# Patient Record
Sex: MALE | Race: ASIAN | NOT HISPANIC OR LATINO | Employment: FULL TIME | URBAN - METROPOLITAN AREA
[De-identification: names, ages, dates, MRNs, and addresses within clinical notes are randomized per-mention and may not be internally consistent; named-entity substitution may affect disease eponyms.]

---

## 2017-02-13 ENCOUNTER — OFFICE VISIT (OUTPATIENT)
Dept: GASTROENTEROLOGY | Facility: CLINIC | Age: 27
End: 2017-02-13

## 2017-02-13 VITALS
HEIGHT: 66 IN | OXYGEN SATURATION: 96 % | DIASTOLIC BLOOD PRESSURE: 74 MMHG | SYSTOLIC BLOOD PRESSURE: 115 MMHG | WEIGHT: 151 LBS | HEART RATE: 84 BPM | BODY MASS INDEX: 24.27 KG/M2

## 2017-02-13 DIAGNOSIS — K50.10 CROHN'S DISEASE OF LARGE INTESTINE WITHOUT COMPLICATION (H): Primary | ICD-10-CM

## 2017-02-13 RX ORDER — INFLIXIMAB 100 MG/10ML
INJECTION, POWDER, LYOPHILIZED, FOR SOLUTION INTRAVENOUS
COMMUNITY
Start: 2016-04-06 | End: 2020-06-09

## 2017-02-13 ASSESSMENT — PAIN SCALES - GENERAL: PAINLEVEL: NO PAIN (0)

## 2017-02-13 NOTE — PROGRESS NOTES
GI CLINIC VISIT - NEW PATIENT    CC/REFERRING MD:   Referred Self  REASON FOR CONSULTATION:  Transfer care of Crohn's Disease    HPI:  26 year old male with primarily R colon CD, originally diagnosed in 2014 who presents to clinic to establish care within the [facility] system. He has had bowel issues, to some degree, since approximately 2011. At that time, he had abdominal pain, cramping and nausea. He was evaluated at that time and was diagnosed with C. Diff colitis. He was treated with metronidazole for a course and felt better. He also had a colonoscopy at that time that apparently showed acute colitis and had no changes c/w chronic inflammation. Approximately 18 months later, he again had abdominal pain and again was diagnosed with C. Diff colitis. He was given vancomycin this time and again improved somewhat and residual symptoms were felt to be due to IBS which was treated with bentyl. His symptoms persisted, however and led to repeat colonoscopy in 2014 that showed changes c/w chronic active inflammatory bowel disease. Due to granulomas in the biopsy specimen, this was felt to represent Crohn's disease. He was initially started on budesonide for a brief period of time (1-2 weeks) but due to persistent symptoms was initiated on remicade therapy (5 mg/kg q8 weeks). He had significant improvement in his symptoms and resolution of his abdominal pain. He is now having 2 formed bowel movements per day (mostly formed but some alternating loose and hard stools). For the last two weeks, he has noted new symptoms of insecurity passing flatus (no accidents but has more insecurity) and some sense of incomplete evacuation. These have both developed in the past two weeks. No fevers, chills, oral ulcers, rashes, joint pains or other EIM.     Has never been on systemic steroids or immunomodulator therapy.     ROS:  10pt ROS performed and otherwise negative.    PERTINENT PAST MEDICAL HISTORY:  As noted above.    PREVIOUS  "ABDOMINAL/GYNECOLOGIC SURGERIES:  As noted above.      PREVIOUS ENDOSCOPY:  Colonoscopy in 2/2016 with mild chronic active colitis in R colon/cecum and normal histology on remainder of random colon biopsies    PERTINENT MEDICATIONS:  Remicade 5mg/kg q8 weeks last infusion 1/13/2017  Multivitamin  Probiotic  Medications reviewed with patient today, see Medication List/Assessment for details.  No other NSAID/anticoagulation reported by patient.  No other OTC/herbal/supplements reported by patient.    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  No IBD/celiac, no other AI/liver/thyroid disease.    PHYSICAL EXAMINATION:  /74  Pulse 84  Ht 1.676 m (5' 6\")  Wt 68.5 kg (151 lb)  SpO2 96%  BMI 24.37 kg/m2  Gen: aaox3, cooperative, pleasant, not dyspneic/diaphoretic, nad  HEENT: ncat, neck supple, no clad/sclad, normal op w/o ulcer/exudate, anicteric, mmm  Mallampati Score II  Resp/CV RRR, nl s1/2, no crackles, rhonchi, rales, good air exchange  Abd:  Soft, NT, ND, BS+  Ext: no c/c/e  Skin: warm, perfused, no jaundice  Neuro: grossly intact, no asterixis noted    PERTINENT STUDIES:  None recently  ASSESSMENT/PLAN:    1. Moderate colonic Crohn's disease currently in clinical remission on remicade therapy   -At this point in time, he has been on biologic monotherapy for >18 months with no clinical evidence of active disease. He has not had recent laboratory work, so will schedule this to be drawn prior to his next infliximab infusion. His last endoscopic surveillance was in 2/2016 and showed mild active disease present only in the cecum (biopsies were taken near the appendiceal orifice) and no active or chronic colitis on the remainder of biopsy specimens. This should be considered near histologic remission as well. "   -As he has never had drug levels checked, we need to ensure he is at therapeutic concentration (certainly above 4, ideally 6-8) for maintenance of remission and decreased antibody formation long-term (certainly higher with CD than UC but majority form in first 12-18 months).  -Data from BRIdGE study would suggest that he is not in a demographic (young male non-smoker with no prior surgeries) that would benefit from dual immune suppression and this is further bolstered by his current deep remission on monotherapy.     2. Colorectal Cancer Screening  -Not due at this time, originally dx in 2014    3. IBD preventative care:  Vaccinations: PCP to ensure UTD including pneumococcal vaccines, seaasonal influenza and consideration for HPV vaccine  Bone health: no steroid exposure, can defer DEXA at this time. Counseled on calcium/vitamin D intake  Dysplasia surveillance: not indicated currently    Counseled against tobacco use, NSAID use (associated with ~25% risk for flare) and to receive yearly skin checks given biologic therapy.         RTC 3-6 months with ZOLTAN Wayne, sooner if symptomatic.      This patient was discussed with Dr. Patten, attending gastroenterologist    Gerardo Falk MD  GI fellow  (p) [phone]

## 2017-02-13 NOTE — NURSING NOTE
Patient left before after visit  summary was completed by Dr. Falk. Printed copy sent via postal service.

## 2017-02-13 NOTE — PATIENT INSTRUCTIONS
I've included a brief summary of our discussion and care plan from today's visit below.  Please review this information with your primary care provider.  _______________________________________________________________________      1. Thanks for coming into clinic to see us today, we appreciate you taking the time out of your day.    2. Recommend routine studies including nutritional and inflammatory markers as we discussed today.  We will also check a remicade level with these labs before your next infusion    3. Start benefiber 1-2 tablespoons daily dissolved in a cool glass of water. If your symptoms (unsure about passing gas and not emptying rectum completely) continue 2-3 months after fiber therapy (or if these symptoms worsen, you have any bleeding, pain or entirely liquid/mushy stools), let us know and we will arrange for a flexible sigmoidoscopy.    4. Make sure you are up to date with vaccinations (including HPV) from your PCP but know we recommend strongly against any live vaccines    5. Take daily calcium/vitamin D      6. Return to GI Clinic with Eric Orellana in 6 months to review your progress, sooner if symptomatic.     If you are unable to schedule this follow-up appointment today, please contact Lata Raymond at [phone] within the next week to help set up this necessary appointment.    _______________________________________________________________________    It was a pleasure seeing you in clinic today - please be in touch if there are any further questions that arise following today's visit.  During business hours, you may reach my Clinic Coordinator at [phone].  For urgent/emergent questions after business hours, you may reach the on-call GI Fellow by contacting the [facility] at [phone].    Any benign/non-urgent test results are usually communicated via letter or [employer] [place] message within 1-2 weeks after completion.  Urgent results (those that require a change in  the previously-discussed care plan) are usually communicated via a phone call once available from our clinic staff to discuss the results and the next steps in your evaluation.    I recommend signing up for [employer] access if you have not already done so and are comfortable with using a computer.  This allows for online access to your lab results and also helps you communicate efficiently with my clinic should any questions arise in your care.    We have Financial Counseling services available through our clinic.  If you have questions about your insurance coverage or payment responsibilities, particularly before you undergo any tests or procedures, please let us know and we can arrange a consultation accordingly to help you make informed decisions about your healthcare.    Sincerely,     Gerardo Falk MD  Gastroenterology Fellow      Our  will call you will a follow up appt with Ms. Esteban Trinidad will call and discuss your infusion.

## 2017-02-13 NOTE — LETTER
2/13/2017       RE: Emil Garcia  [address] [address]     Dear Colleague,    Thank you for referring your patient, Emil Garcia, to the [facility] GASTROENTEROLOGY AND IBD at [facility]. Please see a copy of my visit note below.    GI CLINIC VISIT - NEW PATIENT    CC/REFERRING MD:   Referred Self  REASON FOR CONSULTATION:  Transfer care of Crohn's Disease    HPI:  26 year old male with primarily R colon CD, originally diagnosed in 2014 who presents to clinic to establish care within the [facility] system. He has had bowel issues, to some degree, since approximately 2011. At that time, he had abdominal pain, cramping and nausea. He was evaluated at that time and was diagnosed with C. Diff colitis. He was treated with metronidazole for a course and felt better. He also had a colonoscopy at that time that apparently showed acute colitis and had no changes c/w chronic inflammation. Approximately 18 months later, he again had abdominal pain and again was diagnosed with C. Diff colitis. He was given vancomycin this time and again improved somewhat and residual symptoms were felt to be due to IBS which was treated with bentyl. His symptoms persisted, however and led to repeat colonoscopy in 2014 that showed changes c/w chronic active inflammatory bowel disease. Due to granulomas in the biopsy specimen, this was felt to represent Crohn's disease. He was initially started on budesonide for a brief period of time (1-2 weeks) but due to persistent symptoms was initiated on remicade therapy (5 mg/kg q8 weeks). He had significant improvement in his symptoms and resolution of his abdominal pain. He is now having 2 formed bowel movements per day (mostly formed but some alternating loose and hard stools). For the last two weeks, he has noted new symptoms of insecurity passing flatus (no accidents but has more insecurity) and some sense of incomplete evacuation. These have  "both developed in the past two weeks. No fevers, chills, oral ulcers, rashes, joint pains or other EIM.     Has never been on systemic steroids or immunomodulator therapy.     ROS:  10pt ROS performed and otherwise negative.    PERTINENT PAST MEDICAL HISTORY:  As noted above.    PREVIOUS ABDOMINAL/GYNECOLOGIC SURGERIES:  As noted above.      PREVIOUS ENDOSCOPY:  Colonoscopy in 2/2016 with mild chronic active colitis in R colon/cecum and normal histology on remainder of random colon biopsies    PERTINENT MEDICATIONS:  Remicade 5mg/kg q8 weeks last infusion 1/13/2017  Multivitamin  Probiotic  Medications reviewed with patient today, see Medication List/Assessment for details.  No other NSAID/anticoagulation reported by patient.  No other OTC/herbal/supplements reported by patient.    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  No IBD/celiac, no other AI/liver/thyroid disease.    PHYSICAL EXAMINATION:  /74  Pulse 84  Ht 1.676 m (5' 6\")  Wt 68.5 kg (151 lb)  SpO2 96%  BMI 24.37 kg/m2  Gen: aaox3, cooperative, pleasant, not dyspneic/diaphoretic, nad  HEENT: ncat, neck supple, no clad/sclad, normal op w/o ulcer/exudate, anicteric, mmm  Mallampati Score II  Resp/CV RRR, nl s1/2, no crackles, rhonchi, rales, good air exchange  Abd:  Soft, NT, ND, BS+  Ext: no c/c/e  Skin: warm, perfused, no jaundice  Neuro: grossly intact, no asterixis noted    PERTINENT STUDIES:  None recently  ASSESSMENT/PLAN:    1. Moderate colonic Crohn's disease currently in clinical remission on remicade therapy   -At this point in time, he has been on biologic monotherapy for >18 months with no clinical evidence of active disease. He has not had recent laboratory work, so will schedule this to be drawn prior to his next infliximab infusion. " His last endoscopic surveillance was in 2/2016 and showed mild active disease present only in the cecum (biopsies were taken near the appendiceal orifice) and no active or chronic colitis on the remainder of biopsy specimens. This should be considered near histologic remission as well.   -As he has never had drug levels checked, we need to ensure he is at therapeutic concentration (certainly above 4, ideally 6-8) for maintenance of remission and decreased antibody formation long-term (certainly higher with CD than UC but majority form in first 12-18 months).  -Data from BRIdGE study would suggest that he is not in a demographic (young male non-smoker with no prior surgeries) that would benefit from dual immune suppression and this is further bolstered by his current deep remission on monotherapy.     2. Colorectal Cancer Screening  -Not due at this time, originally dx in 2014    3. IBD preventative care:  Vaccinations: PCP to ensure UTD including pneumococcal vaccines, seaasonal influenza and consideration for HPV vaccine  Bone health: no steroid exposure, can defer DEXA at this time. Counseled on calcium/vitamin D intake  Dysplasia surveillance: not indicated currently    Counseled against tobacco use, NSAID use (associated with ~25% risk for flare) and to receive yearly skin checks given biologic therapy.         RTC 3-6 months with ZOLTAN Wayne, sooner if symptomatic.      This patient was discussed with Dr. Patten, attending gastroenterologist    Gerardo Falk MD  GI fellow  (p) [phone]      I performed a history and physical examination of the above patient and discussed the management with Dr. Falk on 2/13/2017. I reviewed the note and there are no changes to the past medical, family or social history.  A complete 10 point review of systems was obtained. Please see the HPI for pertinent positives and negatives. All other systems were reviewed and were found to be negative. I agree with the documented  findings and plan of care as outlined with the following additions:    26 year old male with abdominal pain and C diff who was ultimately diagnosed with Crohn's disease in the right colon (ileum spared).  He was initially treated on steroids (budesonide and prednisone) but had continued symptoms.  He was started on Infliximab monotherapy (5mg/kg q8), and repeat colonoscopy 2016 with periappendiceal inflammation, otherwise normal.  His pain also improved.  Prior MREs without small bowel disease.     Currently having two well formed stools a day. He is having some possible symptoms of tenesmus.     Suspect symptoms related to some IBS or diet at this time.  Will trial soluble fiber for three months.  If persistent or worsening symptoms will consider flex sig to rule out rectal inflammation.      Otherwise plan to optimize infliximab monotherapy with drug level at next infusion.      RTC in 4-6 months with ZOLTAN Wayne MD  GI Attending  Pager: [phone]

## 2017-02-13 NOTE — NURSING NOTE
"Chief Complaint   Patient presents with     Consult     would like to establish continuing care       Vitals:    02/13/17 1509   BP: 115/74   Pulse: 84   SpO2: 96%   Weight: 68.5 kg (151 lb)   Height: 1.676 m (5' 6\")       Body mass index is 24.37 kg/(m^2).                          "

## 2017-02-13 NOTE — MR AVS SNAPSHOT
After Visit Summary   2/13/2017    Emil Garcia    MRN: [id number]           Patient Information     Date Of Birth          1990        Visit Information        Provider Department      2/13/2017 3:00 PM Jim Patten MD [facility] Gastroenterology and IBD        Care Instructions    I've included a brief summary of our discussion and care plan from today's visit below.  Please review this information with your primary care provider.  _______________________________________________________________________      1. Thanks for coming into clinic to see us today, we appreciate you taking the time out of your day.    2. Recommend routine studies including nutritional and inflammatory markers as we discussed today.  We will also check a remicade level with these labs before your next infusion    3. Start benefiber 1-2 tablespoons daily dissolved in a cool glass of water. If your symptoms (unsure about passing gas and not emptying rectum completely) continue 2-3 months after fiber therapy (or if these symptoms worsen, you have any bleeding, pain or entirely liquid/mushy stools), let us know and we will arrange for a flexible sigmoidoscopy.    4. Make sure you are up to date with vaccinations (including HPV) from your PCP but know we recommend strongly against any live vaccines    5. Take daily calcium/vitamin D      6. Return to GI Clinic with Eric Orellana in 6 months to review your progress, sooner if symptomatic.     If you are unable to schedule this follow-up appointment today, please contact Lata Raymond at [phone] within the next week to help set up this necessary appointment.    _______________________________________________________________________    It was a pleasure seeing you in clinic today - please be in touch if there are any further questions that arise following today's visit.  During business hours, you may reach my Clinic Coordinator at [phone].  For urgent/emergent  questions after business hours, you may reach the on-call GI Fellow by contacting the [facility]  at [phone].    Any benign/non-urgent test results are usually communicated via letter or Checkmarxhart message within 1-2 weeks after completion.  Urgent results (those that require a change in the previously-discussed care plan) are usually communicated via a phone call once available from our clinic staff to discuss the results and the next steps in your evaluation.    I recommend signing up for [employer] access if you have not already done so and are comfortable with using a computer.  This allows for online access to your lab results and also helps you communicate efficiently with my clinic should any questions arise in your care.    We have Financial Counseling services available through our clinic.  If you have questions about your insurance coverage or payment responsibilities, particularly before you undergo any tests or procedures, please let us know and we can arrange a consultation accordingly to help you make informed decisions about your healthcare.    Sincerely,     Gerardo Falk MD  Gastroenterology Fellow      Our  will call you will a follow up appt with Ms. Esteban Trinidad will call and discuss your infusion.           Follow-ups after your visit        Who to contact     Please call your clinic at [phone] to:    Ask questions about your health    Make or cancel appointments    Discuss your medicines    Learn about your test results    Speak to your doctor   If you have compliments or concerns about an experience at your clinic, or if you wish to file a complaint, please contact [facility] Patient Relations at [phone] or email us at [email or web address]         Additional Information About Your Visit        [employer] Information     [employer] is an electronic gateway that provides easy, online access to your medical records. With [employer],  "you can request a clinic appointment, read your test results, renew a prescription or communicate with your care team.     To sign up for [employer] visit the website at [email or web address]   You will be asked to enter the access code listed below, as well as some personal information. Please follow the directions to create your username and password.     Your access code is: [email or web address]  Expires: 2017  6:30 AM     Your access code will  in 90 days. If you need help or a new code, please contact your [facility] or call [phone] for assistance.        Care EveryWhere ID     This is your Care EveryWhere ID. This could be used by other organizations to access your [facility] medical records  [id number]        Your Vitals Were     Pulse Height Pulse Oximetry BMI (Body Mass Index)          84 1.676 m (5' 6\") 96% 24.37 kg/m2         Blood Pressure from Last 3 Encounters:   17 115/74   11 100/72    Weight from Last 3 Encounters:   17 68.5 kg (151 lb)   11 57.2 kg (126 lb) (8 %)*     * Growth percentiles are based on CDC 2-20 Years data.              Today, you had the following     No orders found for display       Primary Care Provider    Physician No Ref-Primary       No address on file        Thank you!     Thank you for choosing [facility] GASTROENTEROLOGY AND IBD  for your care. Our goal is always to provide you with excellent care. Hearing back from our patients is one way we can continue to improve our services. Please take a few minutes to complete the written survey that you may receive in the mail after your visit with us. Thank you!             Your Updated Medication List - Protect others around you: Learn how to safely use, store and throw away your medicines at www.disposemymeds.org.          This list is accurate as of: 17  4:23 PM.  Always use your most recent med list.                   Brand Name Dispense Instructions for use "    inFLIXimab 100 MG injection    REMICADE     100 mg IV every 8 weeks       MULTIVITAL PO      Take  by mouth.

## 2017-02-13 NOTE — PROGRESS NOTES
I performed a history and physical examination of the above patient and discussed the management with Dr. Falk on 2/13/2017. I reviewed the note and there are no changes to the past medical, family or social history.  A complete 10 point review of systems was obtained. Please see the HPI for pertinent positives and negatives. All other systems were reviewed and were found to be negative. I agree with the documented findings and plan of care as outlined with the following additions:    26 year old male with abdominal pain and C diff who was ultimately diagnosed with Crohn's disease in the right colon (ileum spared).  He was initially treated on steroids (budesonide and prednisone) but had continued symptoms.  He was started on Infliximab monotherapy (5mg/kg q8), and repeat colonoscopy 2016 with periappendiceal inflammation, otherwise normal.  His pain also improved.  Prior MREs without small bowel disease.     Currently having two well formed stools a day. He is having some possible symptoms of tenesmus.     Suspect symptoms related to some IBS or diet at this time.  Will trial soluble fiber for three months.  If persistent or worsening symptoms will consider flex sig to rule out rectal inflammation.      Otherwise plan to optimize infliximab monotherapy with drug level at next infusion.      RTC in 4-6 months with ZOLTAN Wayne MD  GI Attending  Pager: [phone]

## 2017-02-14 ENCOUNTER — TELEPHONE (OUTPATIENT)
Dept: GASTROENTEROLOGY | Facility: CLINIC | Age: 27
End: 2017-02-14

## 2017-02-14 DIAGNOSIS — K50.90 CROHN'S DISEASE (H): Primary | ICD-10-CM

## 2017-02-14 RX ORDER — ACETAMINOPHEN 325 MG/1
650 TABLET ORAL ONCE
Status: CANCELLED
Start: 2017-03-24 | End: 2017-03-24

## 2017-02-14 RX ORDER — DIPHENHYDRAMINE HCL 25 MG
25 CAPSULE ORAL ONCE
Status: CANCELLED
Start: 2017-03-24 | End: 2017-03-24

## 2017-02-14 NOTE — TELEPHONE ENCOUNTER
Called patient and let him know that he can call to schedule his infusion.  Patient  given to number to call to schedule.

## 2017-02-14 NOTE — TELEPHONE ENCOUNTER
Emil is calling to schedule remicade infusion.  I do not see current infusion plan / orders.  He was new to Dr. Patten yesterday.  He previously was seen at [facility].  He reports his next infusion (Q 8 weeks) is due 3/24.  He's calling ahead because his previous infusion site was difficult to get into.     Will route to Dr. Sandor Trinidad's clinic nurse for orders.  Requests call back when complete and with infusion scheduling number.

## 2017-02-14 NOTE — NURSING NOTE
Patient is due for next remicade infusion on March 24.   Will call pt with th number to schedule appt.   Will do infliximab level prior to start of infusion on March 24th.

## 2017-02-20 ENCOUNTER — CARE COORDINATION (OUTPATIENT)
Dept: GASTROENTEROLOGY | Facility: CLINIC | Age: 27
End: 2017-02-20

## 2017-02-20 NOTE — PROGRESS NOTES
Called and left a message for patient to call to discuss his infusion and date of his last infusion at another facility.  Left my name and number for pt to call me back.

## 2017-02-21 ENCOUNTER — CARE COORDINATION (OUTPATIENT)
Dept: GASTROENTEROLOGY | Facility: CLINIC | Age: 27
End: 2017-02-21

## 2017-02-21 NOTE — PROGRESS NOTES
Patient returned call.  Stated his last infusion was on January 27.  Patient is new pt with [facility]  Has infusion at [facility].  Patient scheduled himself for March 31 which is 9 weeks inteval.  Discussed importance to have at 8 week intervals.  That if his work or vacation plans interfere with the 8 week intervals can schedule at 6 to 8 interval.   will obtain [facility] infusion records. Patient will call and reschedule infusion appt.

## 2017-03-17 ENCOUNTER — INFUSION THERAPY VISIT (OUTPATIENT)
Dept: INFUSION THERAPY | Facility: CLINIC | Age: 27
End: 2017-03-17
Attending: INTERNAL MEDICINE
Payer: COMMERCIAL

## 2017-03-17 VITALS
HEART RATE: 71 BPM | TEMPERATURE: 97.9 F | WEIGHT: 150.9 LBS | SYSTOLIC BLOOD PRESSURE: 104 MMHG | DIASTOLIC BLOOD PRESSURE: 62 MMHG | BODY MASS INDEX: 24.36 KG/M2 | RESPIRATION RATE: 16 BRPM | OXYGEN SATURATION: 100 %

## 2017-03-17 DIAGNOSIS — K50.90 CROHN'S DISEASE WITHOUT COMPLICATION, UNSPECIFIED GASTROINTESTINAL TRACT LOCATION (H): Primary | ICD-10-CM

## 2017-03-17 DIAGNOSIS — K50.90 CROHN'S DISEASE (H): Primary | ICD-10-CM

## 2017-03-17 LAB
ALBUMIN SERPL-MCNC: 3.7 G/DL (ref 3.4–5)
ALP SERPL-CCNC: 64 U/L (ref 40–150)
ALT SERPL W P-5'-P-CCNC: 58 U/L (ref 0–70)
AST SERPL W P-5'-P-CCNC: 42 U/L (ref 0–45)
BASOPHILS # BLD AUTO: 0 10E9/L (ref 0–0.2)
BASOPHILS NFR BLD AUTO: 0.3 %
BILIRUB DIRECT SERPL-MCNC: 0.2 MG/DL (ref 0–0.2)
BILIRUB SERPL-MCNC: 1.1 MG/DL (ref 0.2–1.3)
CRP SERPL-MCNC: <2.9 MG/L (ref 0–8)
DIFFERENTIAL METHOD BLD: NORMAL
EOSINOPHIL # BLD AUTO: 0.2 10E9/L (ref 0–0.7)
EOSINOPHIL NFR BLD AUTO: 2.8 %
ERYTHROCYTE [DISTWIDTH] IN BLOOD BY AUTOMATED COUNT: 11.9 % (ref 10–15)
ERYTHROCYTE [SEDIMENTATION RATE] IN BLOOD BY WESTERGREN METHOD: 6 MM/H (ref 0–15)
HCT VFR BLD AUTO: 44 % (ref 40–53)
HGB BLD-MCNC: 16 G/DL (ref 13.3–17.7)
IMM GRANULOCYTES # BLD: 0 10E9/L (ref 0–0.4)
IMM GRANULOCYTES NFR BLD: 0.3 %
LYMPHOCYTES # BLD AUTO: 3 10E9/L (ref 0.8–5.3)
LYMPHOCYTES NFR BLD AUTO: 42.8 %
MCH RBC QN AUTO: 32.2 PG (ref 26.5–33)
MCHC RBC AUTO-ENTMCNC: 36.4 G/DL (ref 31.5–36.5)
MCV RBC AUTO: 89 FL (ref 78–100)
MISCELLANEOUS TEST: NORMAL
MONOCYTES # BLD AUTO: 0.4 10E9/L (ref 0–1.3)
MONOCYTES NFR BLD AUTO: 6 %
NEUTROPHILS # BLD AUTO: 3.4 10E9/L (ref 1.6–8.3)
NEUTROPHILS NFR BLD AUTO: 47.8 %
NRBC # BLD AUTO: 0 10*3/UL
NRBC BLD AUTO-RTO: 0 /100
PLATELET # BLD AUTO: 333 10E9/L (ref 150–450)
PROT SERPL-MCNC: 7.4 G/DL (ref 6.8–8.8)
RBC # BLD AUTO: 4.97 10E12/L (ref 4.4–5.9)
WBC # BLD AUTO: 7 10E9/L (ref 4–11)

## 2017-03-17 PROCEDURE — 85025 COMPLETE CBC W/AUTO DIFF WBC: CPT | Performed by: INTERNAL MEDICINE

## 2017-03-17 PROCEDURE — 85652 RBC SED RATE AUTOMATED: CPT | Performed by: INTERNAL MEDICINE

## 2017-03-17 PROCEDURE — 86140 C-REACTIVE PROTEIN: CPT | Performed by: INTERNAL MEDICINE

## 2017-03-17 PROCEDURE — 96413 CHEMO IV INFUSION 1 HR: CPT

## 2017-03-17 PROCEDURE — 96415 CHEMO IV INFUSION ADDL HR: CPT

## 2017-03-17 PROCEDURE — 80076 HEPATIC FUNCTION PANEL: CPT | Performed by: INTERNAL MEDICINE

## 2017-03-17 PROCEDURE — 25000128 H RX IP 250 OP 636: Mod: ZF | Performed by: INTERNAL MEDICINE

## 2017-03-17 RX ORDER — ACETAMINOPHEN 325 MG/1
650 TABLET ORAL ONCE
Status: CANCELLED
Start: 2017-03-24 | End: 2017-03-24

## 2017-03-17 RX ORDER — DIPHENHYDRAMINE HCL 25 MG
25 CAPSULE ORAL ONCE
Status: CANCELLED
Start: 2017-03-24 | End: 2017-03-24

## 2017-03-17 RX ADMIN — INFLIXIMAB 300 MG: 100 INJECTION, POWDER, LYOPHILIZED, FOR SOLUTION INTRAVENOUS at 13:41

## 2017-03-17 NOTE — LETTER
Patient:  Emil Garcia  :   1990  MRN:     [id number]        Mr.Zhengyang Garcia  [address] [address]        2017    Dear ,    Your Remicade level returned at 5.1 without any antibodies.  This is an excellent level.  Please continue your infusions ever 8 weeks.  It is very important to never go beyond 8 weeks.  Always come in early if you are concerned.  Contact my office if you have trouble scheduling infusions or have any questions ([phone]).     Jim Patten MD    [facility]  Inflammatory Bowel Disease Program  Division of Gastroenterology, Hepatology and Nutrition

## 2017-03-17 NOTE — MR AVS SNAPSHOT
After Visit Summary   3/17/2017    Emil Garcia    MRN: [id number]           Patient Information     Date Of Birth          1990        Visit Information        Provider Department      3/17/2017 1:00 PM UC 43 ATC; UC SPEC INFUSION [facility] Specialty and Procedure        Today's Diagnoses     Crohn's disease (H)    -  1       Follow-ups after your visit        Your next 10 appointments already scheduled     May 12, 2017 12:00 PM CDT   Infusion 180 with UC SPEC INFUSION, UC 47 ATC   [facility] Specialty and Procedure ([facility])    [address]  2nd Floor  [facility] [address] [phone]            Sep 08, 2017 12:30 PM CDT   (Arrive by 12:15 PM)   Return Visit with Eric Orellana PA-C   [facility] Gastroenterology and IBD ([facility])    [address] [place] [address]  [address] [facility] [address] [phone]              Who to contact     If you have questions or need follow up information about today's clinic visit or your schedule please contact [facility] SPECIALTY AND PROCEDURE directly at [phone].  Normal or non-critical lab and imaging results will be communicated to you by MyChart, letter or phone within 4 business days after the clinic has received the results. If you do not hear from us within 7 days, please contact the clinic through Faveoushart or phone. If you have a critical or abnormal lab result, we will notify you by phone as soon as possible.  Submit refill requests through Gamestaq or call your pharmacy and they will forward the refill request to us. Please allow 3 business days for your refill to be completed.          Additional Information About Your Visit        MyChart Information     Gamestaq lets you send messages to your doctor, view your test results, renew your prescriptions, schedule appointments and more. To sign up, go to  "[email or web address] . Click on \"Log in\" on the left side of the screen, which will take you to the Welcome page. Then click on \"Sign up Now\" on the right side of the page.     You will be asked to enter the access code listed below, as well as some personal information. Please follow the directions to create your username and password.     Your access code is: [email or web address]  Expires: 2017  7:30 AM     Your access code will  in 90 days. If you need help or a new code, please call your [place] clinic or [phone].        Care EveryWhere ID     This is your Care EveryWhere ID. This could be used by other organizations to access your [place] medical records  [id number]        Your Vitals Were     Pulse Temperature Respirations Pulse Oximetry BMI (Body Mass Index)       71 97.9  F (36.6  C) (Oral) 16 100% 24.36 kg/m2        Blood Pressure from Last 3 Encounters:   17 104/62   17 115/74   11 100/72    Weight from Last 3 Encounters:   17 68.4 kg (150 lb 14.4 oz)   17 68.5 kg (151 lb)   11 57.2 kg (126 lb) (8 %)*     * Growth percentiles are based on [place] 2-20 Years data.              We Performed the Following     CBC with platelets differential     CRP inflammation     Erythrocyte sedimentation rate auto     Hepatic panel     infliximab level to be drawn prior to infusion and sent to [facility].  kit at [facility]: Laboratory Miscellaneous Order     Send outs misc test     Treatment Conditions     Treatment Conditions     Treatment Conditions        Primary Care Provider    Physician No Ref-Primary       No address on file        Thank you!     Thank you for choosing [facility] SPECIALTY AND PROCEDURE  for your care. Our goal is always to provide you with excellent care. Hearing back from our patients is one way we can continue to improve our services. Please take a few minutes to complete the written survey that you may receive in the mail after your " visit with us. Thank you!             Your Updated Medication List - Protect others around you: Learn how to safely use, store and throw away your medicines at www.disposemymeds.org.          This list is accurate as of: 3/17/17  4:40 PM.  Always use your most recent med list.                   Brand Name Dispense Instructions for use    inFLIXimab 100 MG injection    REMICADE     100 mg IV every 8 weeks       MULTIVITAL PO      Take  by mouth.

## 2017-03-17 NOTE — PROGRESS NOTES
"Infusion nursing note:    Emil Garcia presents today to [facility] for a Remicade infusion.  During today's [facility] appointment orders from Dr. Patten were completed.  Dose # every 8 weeks    Progress note:  ID verified by name and .  Assessment completed.  Vitals were stable throughout time in [facility].  verbal education given to patient/representative regarding infusion and possible side effects.  Patient/representative verbalized understanding.    PRIOR TO INFUSION OF BIOLOGICAL MEDICATIONS OR ANY OF THESE AS LISTED: Remicaide (infliximab) \".rheumbiologicalchecklist\"    Prior to Infusion of biological medications or any of these as listed:    1. Elevated temperature, fever, chills, productive cough or abnormal vital signs, night sweats, coughing up blood or sputum, no appetite or abnormal vital signs : NO    2. Open wounds or new incisions: NO    3. Recent hospitalization: NO    4. Any current or recent bouts of illness or infection? On any antibiotics? : NO    5. Any upcoming surgeries or dental procedures?:NO    6. Any new, sudden or worsening abdominal pain :NO    7. Vaccination within 4 weeks? Patient or someone in the household is scheduled to receive vaccination? No live virus vaccines prior to or during treatment :NO    8. Any nervous system diseases [i.e. multiple sclerosis, Guillain-[place], seizures, neurological  changes]: NO    9. New-onset medical symptoms: NO    10.  Evaluate for any sign of active TB [Unexplained weight loss, Loss of appetite, Night sweats, Fever, Fatigue, Chills, Coughing for 3 weeks or longer, Hemoptysis (coughing up blood), Chest pain]: NO    **Note: If answered yes to any of the above, hold the infusion and contact ordering rheumatologist or on-call rheumatologist.     Note: Pt has not taken premeds with previous infusions. Labs including infliximab drawn today.    Infusion given over approximately  2 hours     Discharge Plan:  Reviewed with patient.  Given biologic medication or " medication hand-out. Inform patient if any fever, chills or signs of infection, new symptoms, abdominal pain, heart palpitations, shortness of breath, reaction, weakness, neurological changes, seek medical attention immediately and should not receive infusions. No live virus vaccines prior to or during treatment or up to 6 months post infusion. If the patient has an upcoming procedure or surgery, this should be discussed with the rheumatologist and surgeon or provider.    Discharge instructions were reviewed with patient Yes  Patient/representative verbalized understanding of discharge instructions and all questions answered Yes.    Discharged from [facility] at 1545 with self to home.    Corinne Wagner

## 2017-03-17 NOTE — LETTER
Patient:  Emil Garcia  :   1990  MRN:     [id number]        Mr.Zhengyang Garcia  [address] [address]        2017    Dear ,    We are writing to inform you of your test results.    Blood work is all normal.  We are waiting for the Remicade level to come back. If you have any questions, please don't hesitate to contact the Gastroenterology nurse at [phone].     Jim Patten MD    [facility]  Inflammatory Bowel Disease Program  Division of Gastroenterology, Hepatology and Nutrition        Resulted Orders   CBC with platelets differential   Result Value Ref Range    WBC 7.0 4.0 - 11.0 10e9/L    RBC Count 4.97 4.4 - 5.9 10e12/L    Hemoglobin 16.0 13.3 - 17.7 g/dL    Hematocrit 44.0 40.0 - 53.0 %    MCV 89 78 - 100 fl    MCH 32.2 26.5 - 33.0 pg    MCHC 36.4 31.5 - 36.5 g/dL    RDW 11.9 10.0 - 15.0 %    Platelet Count 333 150 - 450 10e9/L    Diff Method Automated Method     % Neutrophils 47.8 %    % Lymphocytes 42.8 %    % Monocytes 6.0 %    % Eosinophils 2.8 %    % Basophils 0.3 %    % Immature Granulocytes 0.3 %    Nucleated RBCs 0 0 /100    Absolute Neutrophil 3.4 1.6 - 8.3 10e9/L    Absolute Lymphocytes 3.0 0.8 - 5.3 10e9/L    Absolute Monocytes 0.4 0.0 - 1.3 10e9/L    Absolute Eosinophils 0.2 0.0 - 0.7 10e9/L    Absolute Basophils 0.0 0.0 - 0.2 10e9/L    Abs Immature Granulocytes 0.0 0 - 0.4 10e9/L    Absolute Nucleated RBC 0.0    CRP inflammation   Result Value Ref Range    CRP Inflammation <2.9 0.0 - 8.0 mg/L   Erythrocyte sedimentation rate auto   Result Value Ref Range    Sed Rate 6 0 - 15 mm/h   Hepatic panel   Result Value Ref Range    Bilirubin Direct 0.2 0.0 - 0.2 mg/dL    Bilirubin Total 1.1 0.2 - 1.3 mg/dL    Albumin 3.7 3.4 - 5.0 g/dL    Protein Total 7.4 6.8 - 8.8 g/dL    Alkaline Phosphatase 64 40 - 150 U/L    ALT 58 0 - 70 U/L    AST 42 0 - 45 U/L      Comment:      Specimen is hemolyzed which can falsely elevate  AST. Analysis of a   non-hemolyzed   specimen may result in a lower value.     infliximab level [facility] infusion [facility]  kit at [facility]: Laboratory Miscellaneous Order   Result Value Ref Range    Miscellaneous Test       Specimen Received, Reordered and sent to Performing laboratory - Report to   follow upon completion.

## 2017-03-22 LAB — LAB SCANNED RESULT: NORMAL

## 2017-03-31 ENCOUNTER — TELEPHONE (OUTPATIENT)
Dept: GASTROENTEROLOGY | Facility: CLINIC | Age: 27
End: 2017-03-31

## 2017-05-12 ENCOUNTER — INFUSION THERAPY VISIT (OUTPATIENT)
Dept: INFUSION THERAPY | Facility: CLINIC | Age: 27
End: 2017-05-12
Attending: INTERNAL MEDICINE
Payer: COMMERCIAL

## 2017-05-12 VITALS
BODY MASS INDEX: 24.23 KG/M2 | SYSTOLIC BLOOD PRESSURE: 101 MMHG | HEART RATE: 88 BPM | OXYGEN SATURATION: 97 % | TEMPERATURE: 96.9 F | WEIGHT: 150.1 LBS | DIASTOLIC BLOOD PRESSURE: 67 MMHG | RESPIRATION RATE: 18 BRPM

## 2017-05-12 DIAGNOSIS — K50.90 CROHN'S DISEASE WITHOUT COMPLICATION, UNSPECIFIED GASTROINTESTINAL TRACT LOCATION (H): Primary | ICD-10-CM

## 2017-05-12 DIAGNOSIS — K50.90 CROHN'S DISEASE (H): ICD-10-CM

## 2017-05-12 LAB
ALBUMIN SERPL-MCNC: 4 G/DL (ref 3.4–5)
ALP SERPL-CCNC: 69 U/L (ref 40–150)
ALT SERPL W P-5'-P-CCNC: 56 U/L (ref 0–70)
AST SERPL W P-5'-P-CCNC: 28 U/L (ref 0–45)
BASOPHILS # BLD AUTO: 0 10E9/L (ref 0–0.2)
BASOPHILS NFR BLD AUTO: 0.4 %
BILIRUB DIRECT SERPL-MCNC: 0.2 MG/DL (ref 0–0.2)
BILIRUB SERPL-MCNC: 1.3 MG/DL (ref 0.2–1.3)
CRP SERPL-MCNC: <2.9 MG/L (ref 0–8)
DIFFERENTIAL METHOD BLD: NORMAL
EOSINOPHIL # BLD AUTO: 0.1 10E9/L (ref 0–0.7)
EOSINOPHIL NFR BLD AUTO: 1.6 %
ERYTHROCYTE [DISTWIDTH] IN BLOOD BY AUTOMATED COUNT: 11.6 % (ref 10–15)
ERYTHROCYTE [SEDIMENTATION RATE] IN BLOOD BY WESTERGREN METHOD: 8 MM/H (ref 0–15)
HCT VFR BLD AUTO: 44.6 % (ref 40–53)
HGB BLD-MCNC: 16.1 G/DL (ref 13.3–17.7)
IMM GRANULOCYTES # BLD: 0 10E9/L (ref 0–0.4)
IMM GRANULOCYTES NFR BLD: 0.1 %
LYMPHOCYTES # BLD AUTO: 3 10E9/L (ref 0.8–5.3)
LYMPHOCYTES NFR BLD AUTO: 40 %
MCH RBC QN AUTO: 32.1 PG (ref 26.5–33)
MCHC RBC AUTO-ENTMCNC: 36.1 G/DL (ref 31.5–36.5)
MCV RBC AUTO: 89 FL (ref 78–100)
MONOCYTES # BLD AUTO: 0.7 10E9/L (ref 0–1.3)
MONOCYTES NFR BLD AUTO: 9.1 %
NEUTROPHILS # BLD AUTO: 3.6 10E9/L (ref 1.6–8.3)
NEUTROPHILS NFR BLD AUTO: 48.8 %
NRBC # BLD AUTO: 0 10*3/UL
NRBC BLD AUTO-RTO: 0 /100
PLATELET # BLD AUTO: 301 10E9/L (ref 150–450)
PROT SERPL-MCNC: 7.9 G/DL (ref 6.8–8.8)
RBC # BLD AUTO: 5.02 10E12/L (ref 4.4–5.9)
WBC # BLD AUTO: 7.4 10E9/L (ref 4–11)

## 2017-05-12 PROCEDURE — 25000128 H RX IP 250 OP 636: Mod: ZF | Performed by: INTERNAL MEDICINE

## 2017-05-12 PROCEDURE — 96415 CHEMO IV INFUSION ADDL HR: CPT

## 2017-05-12 PROCEDURE — 80076 HEPATIC FUNCTION PANEL: CPT | Performed by: INTERNAL MEDICINE

## 2017-05-12 PROCEDURE — 85025 COMPLETE CBC W/AUTO DIFF WBC: CPT | Performed by: INTERNAL MEDICINE

## 2017-05-12 PROCEDURE — 85652 RBC SED RATE AUTOMATED: CPT | Performed by: INTERNAL MEDICINE

## 2017-05-12 PROCEDURE — 96413 CHEMO IV INFUSION 1 HR: CPT

## 2017-05-12 PROCEDURE — 86140 C-REACTIVE PROTEIN: CPT | Performed by: INTERNAL MEDICINE

## 2017-05-12 PROCEDURE — 36415 COLL VENOUS BLD VENIPUNCTURE: CPT

## 2017-05-12 RX ORDER — ACETAMINOPHEN 325 MG/1
650 TABLET ORAL ONCE
Status: CANCELLED
Start: 2017-05-12 | End: 2017-05-12

## 2017-05-12 RX ORDER — DIPHENHYDRAMINE HCL 25 MG
25 CAPSULE ORAL ONCE
Status: CANCELLED
Start: 2017-05-12 | End: 2017-05-12

## 2017-05-12 RX ADMIN — INFLIXIMAB 300 MG: 100 INJECTION, POWDER, LYOPHILIZED, FOR SOLUTION INTRAVENOUS at 12:37

## 2017-05-12 NOTE — MR AVS SNAPSHOT
After Visit Summary   5/12/2017    Emil Garcia    MRN: [id number]           Patient Information     Date Of Birth          1990        Visit Information        Provider Department      5/12/2017 12:00 PM UC 47 ATC; UC SPEC INFUSION [facility] Specialty and Procedure        Today's Diagnoses     Crohn's disease without complication, unspecified gastrointestinal tract location (H)    -  1    Crohn's disease (H)           Follow-ups after your visit        Your next 10 appointments already scheduled     Jul 07, 2017 12:00 PM CDT   Infusion 180 with UC SPEC INFUSION, UC 47 ATC   [facility] Specialty and Procedure ([facility])    [address]  [address] [facility] [address]   [phone]            Sep 01, 2017 12:00 PM CDT   Infusion 180 with UC SPEC INFUSION, UC 47 ATC   [facility] Specialty and Procedure ([facility])    [address]  [address] [facility] [address] [phone]            Sep 08, 2017 12:30 PM CDT   (Arrive by 12:15 PM)   Return Visit with Eric Orellana PA-C   [facility] Gastroenterology and IBD ([facility])    [address] [place] [address]  4th Floor  [facility] [id number] [phone]              Who to contact     If you have questions or need follow up information about today's clinic visit or your schedule please contact [facility] SPECIALTY AND PROCEDURE directly at [phone].  Normal or non-critical lab and imaging results will be communicated to you by MyChart, letter or phone within 4 business days after the clinic has received the results. If you do not hear from us within 7 days, please contact the clinic through Web Geo Serviceshart or phone. If you have a critical or abnormal lab result, we will notify you by phone as soon as possible.  Submit refill requests through CrossFiber or  call your pharmacy and they will forward the refill request to us. Please allow 3 business days for your refill to be completed.          Additional Information About Your Visit        [employer] Information     [employer] gives you secure access to your electronic health record. If you see a primary care provider, you can also send messages to your care team and make appointments. If you have questions, please call your primary care clinic.  If you do not have a primary care provider, please call [phone] and they will assist you.        Care EveryWhere ID     This is your Care EveryWhere ID. This could be used by other organizations to access your [place] medical records  [id number]        Your Vitals Were     Pulse Temperature Respirations Pulse Oximetry BMI (Body Mass Index)       88 96.9  F (36.1  C) (Oral) 18 97% 24.23 kg/m2        Blood Pressure from Last 3 Encounters:   05/12/17 101/67   03/17/17 104/62   02/13/17 115/74    Weight from Last 3 Encounters:   05/12/17 68.1 kg (150 lb 1.6 oz)   03/17/17 68.4 kg (150 lb 14.4 oz)   02/13/17 68.5 kg (151 lb)              We Performed the Following     CBC with platelets differential     CRP inflammation     Erythrocyte sedimentation rate auto     Hepatic panel     Treatment Conditions     Treatment Conditions     Treatment Conditions     Treatment Conditions        Primary Care Provider    Physician No Ref-Primary       No address on file        Thank you!     Thank you for choosing [facility] SPECIALTY AND PROCEDURE  for your care. Our goal is always to provide you with excellent care. Hearing back from our patients is one way we can continue to improve our services. Please take a few minutes to complete the written survey that you may receive in the mail after your visit with us. Thank you!             Your Updated Medication List - Protect others around you: Learn how to safely use, store and throw away your medicines at  www.disposemymeds.org.          This list is accurate as of: 5/12/17  3:05 PM.  Always use your most recent med list.                   Brand Name Dispense Instructions for use    inFLIXimab 100 MG injection    REMICADE     100 mg IV every 8 weeks       MULTIVITAL PO      Take  by mouth.

## 2017-05-12 NOTE — PROGRESS NOTES
"Nursing Note  Emil Garcia [facility]:   Chief Complaint   Patient presents with     Infusion     Remicade     During [facility]'s [facility] appointment, orders from Dr. Patten were completed.  Frequency: every 8 weeks    Progress note:  Patient identification verified by name and date of birth.  Assessment completed.  Vitals recorded in Doc Flowsheets.  Patient was provided with education regarding infusion and possible side effects.  Patient verbalized understanding.      needed: No   Premedications: historically patient has not taken pre-medications prior to infusion.  Infusion Rates: infusion given over approximately 2 hours.  Labs: were drawn per orders.   Vascular access: peripheral IV placed today.  Treatment Conditions: Biologic/Chemo Checklist PRIOR TO INFUSION OF BIOLOGICAL MEDICATIONS OR ANY OF THESE AS LISTED: Remicaide (infliximab) \".rheumbiologicalchecklist\"    Prior to Infusion of biological medications or any of these as listed:    1. Elevated temperature, fever, chills, productive cough or abnormal vital signs, night sweats, coughing up blood or sputum, no appetite or abnormal vital signs : NO    2. Open wounds or new incisions: NO    3. Recent hospitalization: NO    4.  Recent surgeries:  NO    5. Any upcoming surgeries or dental procedures?:NO    6. Any current or recent bouts of illness or infection? On any antibiotics? : NO    7. Any new, sudden or worsening abdominal pain :NO    8. Vaccination within 4 weeks? Patient or someone in the household is scheduled to receive vaccination? No live virus vaccines prior to or during treatment :NO    9. Any nervous system diseases [i.e. multiple sclerosis, Guillain-[place], seizures, neurological  changes]: NO    10. Pregnant or breast feeding; or plans on pregnancy in the future: NO    11. Signs of worsening depression or suicidal ideations while taking " benlysta:NO    12. New-onset medical symptoms: NO    13.  New cancer diagnosis or on chemotherapy or radiation NO    14.  Evaluate for any sign of active TB [Unexplained weight loss, Loss of appetite, Night sweats, Fever, Fatigue, Chills, Coughing for 3 weeks or longer, Hemoptysis (coughing up blood), Chest pain]: NO    **Note: If answered yes to any of the above, hold the infusion and contact ordering rheumatologist or on-call rheumatologist.   Patient tolerated infusion: well.      Discharge Plan:   Follow up plan of care with: ongoing infusions at [facility].  Discharge instructions were reviewed with patient.  Patient/representative verbalized understanding of discharge instructions and all questions answered.  Patient discharged from [facility] in stable condition.    Ericka Severson, RN       Administrations This Visit     inFLIXimab (REMICADE) 300 mg in NaCl 0.9 % 305 mL non-oncology use     Admin Date Action Dose Rate Route Administered By          05/12/2017 New Bag 300 mg 152.5 mL/hr Intravenous Dana Gill RN                           /70  Pulse 88  Temp 96.9  F (36.1  C) (Oral)  Resp 18  Wt 68.1 kg (150 lb 1.6 oz)  SpO2 97%  BMI 24.23 kg/m2

## 2017-07-07 ENCOUNTER — INFUSION THERAPY VISIT (OUTPATIENT)
Dept: INFUSION THERAPY | Facility: CLINIC | Age: 27
End: 2017-07-07
Attending: INTERNAL MEDICINE
Payer: COMMERCIAL

## 2017-07-07 VITALS
OXYGEN SATURATION: 96 % | BODY MASS INDEX: 23.92 KG/M2 | WEIGHT: 148.2 LBS | TEMPERATURE: 98.5 F | HEART RATE: 86 BPM | DIASTOLIC BLOOD PRESSURE: 78 MMHG | RESPIRATION RATE: 18 BRPM | SYSTOLIC BLOOD PRESSURE: 122 MMHG

## 2017-07-07 DIAGNOSIS — K50.90 CROHN'S DISEASE WITHOUT COMPLICATION, UNSPECIFIED GASTROINTESTINAL TRACT LOCATION (H): Primary | ICD-10-CM

## 2017-07-07 DIAGNOSIS — K50.90 CROHN'S DISEASE (H): ICD-10-CM

## 2017-07-07 LAB
ALBUMIN SERPL-MCNC: 3.7 G/DL (ref 3.4–5)
ALP SERPL-CCNC: 71 U/L (ref 40–150)
ALT SERPL W P-5'-P-CCNC: 35 U/L (ref 0–70)
AST SERPL W P-5'-P-CCNC: 16 U/L (ref 0–45)
BASOPHILS # BLD AUTO: 0 10E9/L (ref 0–0.2)
BASOPHILS NFR BLD AUTO: 0.3 %
BILIRUB DIRECT SERPL-MCNC: 0.1 MG/DL (ref 0–0.2)
BILIRUB SERPL-MCNC: 0.9 MG/DL (ref 0.2–1.3)
CRP SERPL-MCNC: <2.9 MG/L (ref 0–8)
DIFFERENTIAL METHOD BLD: NORMAL
EOSINOPHIL # BLD AUTO: 0.1 10E9/L (ref 0–0.7)
EOSINOPHIL NFR BLD AUTO: 1.9 %
ERYTHROCYTE [DISTWIDTH] IN BLOOD BY AUTOMATED COUNT: 11.9 % (ref 10–15)
ERYTHROCYTE [SEDIMENTATION RATE] IN BLOOD BY WESTERGREN METHOD: 8 MM/H (ref 0–15)
HCT VFR BLD AUTO: 42.9 % (ref 40–53)
HGB BLD-MCNC: 15.1 G/DL (ref 13.3–17.7)
IMM GRANULOCYTES # BLD: 0 10E9/L (ref 0–0.4)
IMM GRANULOCYTES NFR BLD: 0.2 %
LYMPHOCYTES # BLD AUTO: 2.5 10E9/L (ref 0.8–5.3)
LYMPHOCYTES NFR BLD AUTO: 38.9 %
MCH RBC QN AUTO: 31.8 PG (ref 26.5–33)
MCHC RBC AUTO-ENTMCNC: 35.2 G/DL (ref 31.5–36.5)
MCV RBC AUTO: 90 FL (ref 78–100)
MONOCYTES # BLD AUTO: 0.4 10E9/L (ref 0–1.3)
MONOCYTES NFR BLD AUTO: 6.8 %
NEUTROPHILS # BLD AUTO: 3.3 10E9/L (ref 1.6–8.3)
NEUTROPHILS NFR BLD AUTO: 51.9 %
NRBC # BLD AUTO: 0 10*3/UL
NRBC BLD AUTO-RTO: 0 /100
PLATELET # BLD AUTO: 305 10E9/L (ref 150–450)
PROT SERPL-MCNC: 7.5 G/DL (ref 6.8–8.8)
RBC # BLD AUTO: 4.75 10E12/L (ref 4.4–5.9)
WBC # BLD AUTO: 6.3 10E9/L (ref 4–11)

## 2017-07-07 PROCEDURE — 96415 CHEMO IV INFUSION ADDL HR: CPT

## 2017-07-07 PROCEDURE — 85025 COMPLETE CBC W/AUTO DIFF WBC: CPT | Performed by: INTERNAL MEDICINE

## 2017-07-07 PROCEDURE — 85652 RBC SED RATE AUTOMATED: CPT | Performed by: INTERNAL MEDICINE

## 2017-07-07 PROCEDURE — 86140 C-REACTIVE PROTEIN: CPT | Performed by: INTERNAL MEDICINE

## 2017-07-07 PROCEDURE — 36415 COLL VENOUS BLD VENIPUNCTURE: CPT | Performed by: INTERNAL MEDICINE

## 2017-07-07 PROCEDURE — 96413 CHEMO IV INFUSION 1 HR: CPT

## 2017-07-07 PROCEDURE — 25000128 H RX IP 250 OP 636: Mod: ZF | Performed by: INTERNAL MEDICINE

## 2017-07-07 PROCEDURE — 80076 HEPATIC FUNCTION PANEL: CPT | Performed by: INTERNAL MEDICINE

## 2017-07-07 RX ORDER — DIPHENHYDRAMINE HCL 25 MG
25 CAPSULE ORAL ONCE
Status: CANCELLED
Start: 2017-07-07 | End: 2017-07-07

## 2017-07-07 RX ORDER — ACETAMINOPHEN 325 MG/1
650 TABLET ORAL ONCE
Status: CANCELLED
Start: 2017-07-07 | End: 2017-07-07

## 2017-07-07 RX ADMIN — INFLIXIMAB 300 MG: 100 INJECTION, POWDER, LYOPHILIZED, FOR SOLUTION INTRAVENOUS at 12:49

## 2017-07-07 NOTE — PATIENT INSTRUCTIONS
Dear Emil Garcia    Thank you for choosing [facility] ([facility]) for your infusion.  The following information is a summary of our appointment as well as important reminders.      POST-INFUSION OF BIOLOGICAL MEDICATION:     Inform patient if any fever, chills or signs of infection, new symptoms, abdominal pain, heart palpitations, shortness of breath, reaction, weakness, neurological changes, seek medical attention immediately and should not receive infusions. No live virus vaccines prior to or during treatment or up to 6 months post infusion. If the patient has an upcoming procedure or surgery, this should be discussed with the rheumatologist and surgeon or provider.    Additional information: you received your infusion of Remicade 300 mg via IV today.       We look forward in seeing you on your next appointment here at [facility].  Please don t hesitate to call us at [phone] to reschedule any of your appointments or to speak with one of the [facility] registered nurses.  It was a pleasure taking care of you today.    Sincerely,  Jaquelin Rodriguez RN  [facility]  [facility]  [address] [place] [address]  [address]  Phone:  [phone]      Infliximab Solution for injection  What is this medicine?  INFLIXIMAB (in FLIX i mab) is used to treat Crohn's disease and ulcerative colitis. It is also used to treat ankylosing spondylitis, psoriasis, and some forms of arthritis.  This medicine may be used for other purposes; ask your health care provider or pharmacist if you have questions.  What should I tell my health care provider before I take this medicine?  They need to know if you have any of these conditions:    diabetes    exposure to tuberculosis    heart failure    hepatitis or liver disease    immune system problems    infection    lung or breathing disease, like COPD    multiple sclerosis    current  or past resident of [place]    seizure disorder    an unusual or allergic reaction to infliximab, mouse proteins, other medicines, foods, dyes, or preservatives    pregnant or trying to get pregnant    breast-feeding  How should I use this medicine?  This medicine is for injection into a vein. It is usually given by a health care professional in a hospital or clinic setting.  A special MedGuide will be given to you by the pharmacist with each prescription and refill. Be sure to read this information carefully each time.  Talk to your pediatrician regarding the use of this medicine in children. Special care may be needed.  Overdosage: If you think you have taken too much of this medicine contact a poison control center or emergency room at once.  NOTE: This medicine is only for you. Do not share this medicine with others.  What if I miss a dose?  It is important not to miss your dose. Call your doctor or health care professional if you are unable to keep an appointment.  What may interact with this medicine?  Do not take this medicine with any of the following medications:    anakinra    rilonacept  This medicine may also interact with the following medications:    vaccines  This list may not describe all possible interactions. Give your health care provider a list of all the medicines, herbs, non-prescription drugs, or dietary supplements you use. Also tell them if you smoke, drink alcohol, or use illegal drugs. Some items may interact with your medicine.  What should I watch for while using this medicine?  Visit your doctor or health care professional for regular checks on your progress.  If you get a cold or other infection while receiving this medicine, call your doctor or health care professional. Do not treat yourself. This medicine may decrease your body's ability to fight infections. Before beginning therapy, your doctor may do a test to see if you have been exposed to  tuberculosis.  This medicine may make the symptoms of heart failure worse in some patients. If you notice symptoms such as increased shortness of breath or swelling of the ankles or legs, contact your health care provider right away.  If you are going to have surgery or dental work, tell your health care professional or dentist that you have received this medicine.  If you take this medicine for plaque psoriasis, stay out of the sun. If you cannot avoid being in the sun, wear protective clothing and use sunscreen. Do not use sun lamps or tanning beds/booths.  What side effects may I notice from receiving this medicine?  Side effects that you should report to your doctor or health care professional as soon as possible:    allergic reactions like skin rash, itching or hives, swelling of the face, lips, or tongue    chest pain    fever or chills, usually related to the infusion    muscle or joint pain    red, scaly patches or raised bumps on the skin    signs of infection - fever or chills, cough, sore throat, pain or difficulty passing urine    swollen lymph nodes in the neck, underarm, or groin areas    unexplained weight loss    unusual bleeding or bruising    unusually weak or tired    yellowing of the eyes or skin  Side effects that usually do not require medical attention (report to your doctor or health care professional if they continue or are bothersome):    headache    heartburn or stomach pain    nausea, vomiting  This list may not describe all possible side effects. Call your doctor for medical advice about side effects. You may report side effects to FDA at 4-762-FDA-5772.  Where should I keep my medicine?  This drug is given in a hospital or clinic and will not be stored at home.  NOTE:This sheet is a summary. It may not cover all possible information. If you have questions about this medicine, talk to your doctor, pharmacist, or health care provider. Copyright  2016 Gold Standard

## 2017-07-07 NOTE — MR AVS SNAPSHOT
After Visit Summary   7/7/2017    Emil Garcia    MRN: [id number]           Patient Information     Date Of Birth          1990        Visit Information        Provider Department      7/7/2017 12:00 PM UC 47 ATC; UC SPEC INFUSION [facility] Special[facility]        Today's Diagnoses     Crohn's disease without complication, unspecified gastrointestinal tract location (H)    -  1    Crohn's disease (H)          Care Instructions    Dear Emil Garcia    Thank you for choosing [facility] ([facility]) for your infusion.  The following information is a summary of our appointment as well as important reminders.      POST-INFUSION OF BIOLOGICAL MEDICATION:     Inform patient if any fever, chills or signs of infection, new symptoms, abdominal pain, heart palpitations, shortness of breath, reaction, weakness, neurological changes, seek medical attention immediately and should not receive infusions. No live virus vaccines prior to or during treatment or up to 6 months post infusion. If the patient has an upcoming procedure or surgery, this should be discussed with the rheumatologist and surgeon or provider.    Additional information: you received your infusion of Remicade 300 mg via IV today.       We look forward in seeing you on your next appointment here at [facility].  Please don t hesitate to call us at [phone] to reschedule any of your appointments or to speak with one of the [facility] registered nurses.  It was a pleasure taking care of you today.    Sincerely,  Jaquelin Rodriguez RN  [facility]  Specialty Infusion [facility] Procedure [facility]  [address] [place] [address]  [address]  Phone:  [phone]      Infliximab Solution for injection  What is this medicine?  INFLIXIMAB (in FLIX i mab) is used to treat Crohn's disease and ulcerative colitis. It is also used to treat ankylosing  spondylitis, psoriasis, and some forms of arthritis.  This medicine may be used for other purposes; ask your health care provider or pharmacist if you have questions.  What should I tell my health care provider before I take this medicine?  They need to know if you have any of these conditions:    diabetes    exposure to tuberculosis    heart failure    hepatitis or liver disease    immune system problems    infection    lung or breathing disease, like COPD    multiple sclerosis    current or past resident of [place]    seizure disorder    an unusual or allergic reaction to infliximab, mouse proteins, other medicines, foods, dyes, or preservatives    pregnant or trying to get pregnant    breast-feeding  How should I use this medicine?  This medicine is for injection into a vein. It is usually given by a health care professional in a hospital or clinic setting.  A special MedGuide will be given to you by the pharmacist with each prescription and refill. Be sure to read this information carefully each time.  Talk to your pediatrician regarding the use of this medicine in children. Special care may be needed.  Overdosage: If you think you have taken too much of this medicine contact a poison control center or emergency room at once.  NOTE: This medicine is only for you. Do not share this medicine with others.  What if I miss a dose?  It is important not to miss your dose. Call your doctor or health care professional if you are unable to keep an appointment.  What may interact with this medicine?  Do not take this medicine with any of the following medications:    anakinra    rilonacept  This medicine may also interact with the following medications:    vaccines  This list may not describe all possible interactions. Give your health care provider a list of all the medicines, herbs, non-prescription drugs, or dietary supplements you use. Also tell them if you smoke, drink alcohol, or use illegal  drugs. Some items may interact with your medicine.  What should I watch for while using this medicine?  Visit your doctor or health care professional for regular checks on your progress.  If you get a cold or other infection while receiving this medicine, call your doctor or health care professional. Do not treat yourself. This medicine may decrease your body's ability to fight infections. Before beginning therapy, your doctor may do a test to see if you have been exposed to tuberculosis.  This medicine may make the symptoms of heart failure worse in some patients. If you notice symptoms such as increased shortness of breath or swelling of the ankles or legs, contact your health care provider right away.  If you are going to have surgery or dental work, tell your health care professional or dentist that you have received this medicine.  If you take this medicine for plaque psoriasis, stay out of the sun. If you cannot avoid being in the sun, wear protective clothing and use sunscreen. Do not use sun lamps or tanning beds/booths.  What side effects may I notice from receiving this medicine?  Side effects that you should report to your doctor or health care professional as soon as possible:    allergic reactions like skin rash, itching or hives, swelling of the face, lips, or tongue    chest pain    fever or chills, usually related to the infusion    muscle or joint pain    red, scaly patches or raised bumps on the skin    signs of infection - fever or chills, cough, sore throat, pain or difficulty passing urine    swollen lymph nodes in the neck, underarm, or groin areas    unexplained weight loss    unusual bleeding or bruising    unusually weak or tired    yellowing of the eyes or skin  Side effects that usually do not require medical attention (report to your doctor or health care professional if they continue or are bothersome):    headache    heartburn or stomach pain    nausea, vomiting  This list may not  describe all possible side effects. Call your doctor for medical advice about side effects. You may report side effects to FDA at 4-082-IVX-7046.  Where should I keep my medicine?  This drug is given in a hospital or clinic and will not be stored at home.  NOTE:This sheet is a summary. It may not cover all possible information. If you have questions about this medicine, talk to your doctor, pharmacist, or health care provider. Copyright  2016 Gold Standard                Follow-ups after your visit        Your next 10 appointments already scheduled     Sep 01, 2017 12:00 PM CDT   Infusion 180 with UC SPEC INFUSION, UC 47 ATC   [facility] Specialty and Procedure ([facility])    [address]  2nd Floor  [facility] [address] [phone]            Sep 08, 2017 12:30 PM CDT   (Arrive by 12:15 PM)   Return Visit with Eric Orellana PA-C   [facility] ([facility])    [address] [place] [address]  4th Floor  [facility] [address] [phone]              Who to contact     If you have questions or need follow up information about today's clinic visit or your schedule please contact [facility] SPECIALTY AND PROCEDURE directly at [phone].  Normal or non-critical lab and imaging results will be communicated to you by [employer]hart, letter or phone within 4 business days after the clinic has received the results. If you do not hear from us within 7 days, please contact the clinic through [employer]hart or phone. If you have a critical or abnormal lab result, we will notify you by phone as soon as possible.  Submit refill requests through [employer] or call your pharmacy and they will forward the refill request to us. Please allow 3 business days for your refill to be completed.          Additional Information About Your Visit        [employer] Information     [employer] gives you secure access to your  electronic health record. If you see a primary care provider, you can also send messages to your care team and make appointments. If you have questions, please call your primary care clinic.  If you do not have a primary care provider, please call [phone] and they will assist you.        Care EveryWhere ID     This is your Care EveryWhere ID. This could be used by other organizations to access your [facility] medical records  [id number]        Your Vitals Were     Pulse Temperature Respirations Pulse Oximetry BMI (Body Mass Index)       90 98.5  F (36.9  C) (Tympanic) 18 96% 23.92 kg/m2        Blood Pressure from Last 3 Encounters:   07/07/17 105/71   05/12/17 101/67   03/17/17 104/62    Weight from Last 3 Encounters:   07/07/17 67.2 kg (148 lb 3.2 oz)   05/12/17 68.1 kg (150 lb 1.6 oz)   03/17/17 68.4 kg (150 lb 14.4 oz)              We Performed the Following     CBC with platelets differential     CRP inflammation     Erythrocyte sedimentation rate auto     Hepatic panel        Primary Care Provider    Physician No Ref-Primary       No address on file        Equal Access to Services     HAYLEE ALANIS : Hadmahogany Pereira, cecil yuan, batsheva fried, beatris arteaga . So [facility] [phone].    ATENCIÓN: Si habla español, tiene a perkins disposición servicios gratuitos de asistencia lingüística. [facility] [phone].    We comply with applicable federal civil rights laws and [place] laws. We do not discriminate on the basis of race, color, national origin, age, disability sex, sexual orientation or gender identity.            Thank you!     Thank you for choosing [facility] SPECIALTY AND PROCEDURE  for your care. Our goal is always to provide you with excellent care. Hearing back from our patients is one way we can continue to improve our services. Please take a few minutes to complete the written survey that you may receive in the mail  after your visit with us. Thank you!             Your Updated Medication List - Protect others around you: Learn how to safely use, store and throw away your medicines at www.disposemymeds.org.          This list is accurate as of: 7/7/17  1:28 PM.  Always use your most recent med list.                   Brand Name Dispense Instructions for use Diagnosis    inFLIXimab 100 MG injection    REMICADE     100 mg IV every 8 weeks        MULTIVITAL PO      Take  by mouth.

## 2017-07-07 NOTE — PROGRESS NOTES
"PRIOR TO INFUSION OF BIOLOGICAL MEDICATIONS OR ANY OF THESE AS LISTED: Remicaide (infliximab) \".rheumbiologicalchecklist\"    Prior to Infusion of biological medications or any of these as listed:    1. Elevated temperature, fever, chills, productive cough or abnormal vital signs, night sweats, coughing up blood or sputum, no appetite or abnormal vital signs : NO    2. Open wounds or new incisions: NO    3. Recent hospitalization: NO    4.  Recent surgeries:  NO    5. Any upcoming surgeries or dental procedures?:NO    6. Any current or recent bouts of illness or infection? On any antibiotics? : NO    7. Any new, sudden or worsening abdominal pain :NO    8. Vaccination within 4 weeks? Patient or someone in the household is scheduled to receive vaccination? No live virus vaccines prior to or during treatment :NO    9. Any nervous system diseases [i.e. multiple sclerosis, Guillain-[place], seizures, neurological  changes]: NO    10. Pregnant or breast feeding; or plans on pregnancy in the future: NO    11. Signs of worsening depression or suicidal ideations while taking benlysta:NO    12. New-onset medical symptoms: NO    13.  New cancer diagnosis or on chemotherapy or radiation NO    14.  Evaluate for any sign of active TB [Unexplained weight loss, Loss of appetite, Night sweats, Fever, Fatigue, Chills, Coughing for 3 weeks or longer, Hemoptysis (coughing up blood), Chest pain]: NO    **Note: If answered yes to any of the above, hold the infusion and contact ordering rheumatologist or on-call rheumatologist.     Nursing Note  Emil Garcia [facility] for:   Chief Complaint   Patient presents with     Infusion     Remicade     During today's [facility] appointment, orders from Dr. Patten were completed.  Frequency: every 8 weeks    Progress note:  Patient identification verified by name and date of birth.  Assessment completed.  Vitals recorded " in Doc Flowsheets.  Patient was provided with education regarding infusion and possible side effects.  Patient verbalized understanding.      needed: No  Premedications: historically patient has not taken pre-medications prior to infusion.  Infusion Rates: infusion given over approximately 2 hours.  Approximate Infusion length:2 hours.   Labs: were drawn per orders.   Vascular access: peripheral IV placed today.  Treatment Conditions: RN reviewed the following with patient: Medication hand-out provided to patient, Inform patient if any fever, chills or signs of infection, new symptoms, abdominal pain, heart palpitations, shortness of breath, reaction, weakness, neurological changes, seek medical attention immediately and should not receive infusions. No live virus vaccines prior to or during treatment or up to 6 months post infusion. If the patient has an upcoming procedure or surgery, this should be discussed with the rheumatologist and surgeon or provider.  Patient tolerated infusion: well.        Discharge Plan:   Follow up plan of care with: ongoing infusions at [facility].  Discharge instructions were reviewed with patient.  Patient/representative verbalized understanding of discharge instructions and all questions answered.  Patient discharged from [facility] in stable condition.    Jaquelin Rodriguez RN    Administrations This Visit     inFLIXimab (REMICADE) 300 mg in NaCl 0.9 % 305 mL non-oncology use     Admin Date Action Dose Rate Route Administered By          07/07/2017 New Bag 300 mg 152.5 mL/hr Intravenous Jaquelin Rodriguez RN                         /78  Pulse 86  Temp 98.5  F (36.9  C) (Tympanic)  Resp 18  Wt 67.2 kg (148 lb 3.2 oz)  SpO2 96%  BMI 23.92 kg/m2

## 2017-09-01 ENCOUNTER — INFUSION THERAPY VISIT (OUTPATIENT)
Dept: INFUSION THERAPY | Facility: CLINIC | Age: 27
End: 2017-09-01
Attending: INTERNAL MEDICINE
Payer: COMMERCIAL

## 2017-09-01 VITALS
HEART RATE: 96 BPM | RESPIRATION RATE: 16 BRPM | WEIGHT: 145.8 LBS | SYSTOLIC BLOOD PRESSURE: 111 MMHG | TEMPERATURE: 97.8 F | BODY MASS INDEX: 23.53 KG/M2 | DIASTOLIC BLOOD PRESSURE: 72 MMHG

## 2017-09-01 DIAGNOSIS — K50.90 CROHN'S DISEASE (H): ICD-10-CM

## 2017-09-01 DIAGNOSIS — K50.90 CROHN'S DISEASE WITHOUT COMPLICATION, UNSPECIFIED GASTROINTESTINAL TRACT LOCATION (H): Primary | ICD-10-CM

## 2017-09-01 LAB
ALBUMIN SERPL-MCNC: 4 G/DL (ref 3.4–5)
ALP SERPL-CCNC: 77 U/L (ref 40–150)
ALT SERPL W P-5'-P-CCNC: 58 U/L (ref 0–70)
AST SERPL W P-5'-P-CCNC: 32 U/L (ref 0–45)
BASOPHILS # BLD AUTO: 0 10E9/L (ref 0–0.2)
BASOPHILS NFR BLD AUTO: 0.3 %
BILIRUB DIRECT SERPL-MCNC: 0.2 MG/DL (ref 0–0.2)
BILIRUB SERPL-MCNC: 1 MG/DL (ref 0.2–1.3)
CRP SERPL-MCNC: <2.9 MG/L (ref 0–8)
DIFFERENTIAL METHOD BLD: NORMAL
EOSINOPHIL # BLD AUTO: 0.2 10E9/L (ref 0–0.7)
EOSINOPHIL NFR BLD AUTO: 2.4 %
ERYTHROCYTE [DISTWIDTH] IN BLOOD BY AUTOMATED COUNT: 11.5 % (ref 10–15)
ERYTHROCYTE [SEDIMENTATION RATE] IN BLOOD BY WESTERGREN METHOD: 8 MM/H (ref 0–15)
HCT VFR BLD AUTO: 45.4 % (ref 40–53)
HGB BLD-MCNC: 16.5 G/DL (ref 13.3–17.7)
IMM GRANULOCYTES # BLD: 0 10E9/L (ref 0–0.4)
IMM GRANULOCYTES NFR BLD: 0.4 %
LYMPHOCYTES # BLD AUTO: 3 10E9/L (ref 0.8–5.3)
LYMPHOCYTES NFR BLD AUTO: 39.3 %
MCH RBC QN AUTO: 32.4 PG (ref 26.5–33)
MCHC RBC AUTO-ENTMCNC: 36.3 G/DL (ref 31.5–36.5)
MCV RBC AUTO: 89 FL (ref 78–100)
MONOCYTES # BLD AUTO: 0.5 10E9/L (ref 0–1.3)
MONOCYTES NFR BLD AUTO: 6.5 %
NEUTROPHILS # BLD AUTO: 3.9 10E9/L (ref 1.6–8.3)
NEUTROPHILS NFR BLD AUTO: 51.1 %
NRBC # BLD AUTO: 0 10*3/UL
NRBC BLD AUTO-RTO: 0 /100
PLATELET # BLD AUTO: 322 10E9/L (ref 150–450)
PROT SERPL-MCNC: 8.1 G/DL (ref 6.8–8.8)
RBC # BLD AUTO: 5.09 10E12/L (ref 4.4–5.9)
WBC # BLD AUTO: 7.6 10E9/L (ref 4–11)

## 2017-09-01 PROCEDURE — 85025 COMPLETE CBC W/AUTO DIFF WBC: CPT | Performed by: INTERNAL MEDICINE

## 2017-09-01 PROCEDURE — 86140 C-REACTIVE PROTEIN: CPT | Performed by: INTERNAL MEDICINE

## 2017-09-01 PROCEDURE — 96413 CHEMO IV INFUSION 1 HR: CPT

## 2017-09-01 PROCEDURE — 96415 CHEMO IV INFUSION ADDL HR: CPT

## 2017-09-01 PROCEDURE — 80076 HEPATIC FUNCTION PANEL: CPT | Performed by: INTERNAL MEDICINE

## 2017-09-01 PROCEDURE — 40000269 ZZH STATISTIC NO CHARGE FACILITY FEE: Mod: ZF

## 2017-09-01 PROCEDURE — 36415 COLL VENOUS BLD VENIPUNCTURE: CPT

## 2017-09-01 PROCEDURE — 25000128 H RX IP 250 OP 636: Mod: ZF | Performed by: INTERNAL MEDICINE

## 2017-09-01 PROCEDURE — 85652 RBC SED RATE AUTOMATED: CPT | Performed by: INTERNAL MEDICINE

## 2017-09-01 RX ORDER — ACETAMINOPHEN 325 MG/1
650 TABLET ORAL ONCE
Status: CANCELLED
Start: 2017-09-01 | End: 2017-09-01

## 2017-09-01 RX ORDER — DIPHENHYDRAMINE HCL 25 MG
25 CAPSULE ORAL ONCE
Status: CANCELLED
Start: 2017-09-01 | End: 2017-09-01

## 2017-09-01 RX ADMIN — INFLIXIMAB 300 MG: 100 INJECTION, POWDER, LYOPHILIZED, FOR SOLUTION INTRAVENOUS at 12:25

## 2017-09-01 NOTE — PROGRESS NOTES
Nursing Note  Emil Garcia [facility] for:   Chief Complaint   Patient presents with     Infusion     Remicade     During today's [facility] appointment, orders from Dr. martinez were completed.  Frequency: every 8 weeks, This is patient's 4th dose.    Progress note:  Patient identification verified by name and date of birth.  Assessment completed.  Vitals recorded in Doc Flowsheets.  Patient was provided with education regarding infusion and possible side effects.  Patient verbalized understanding.      needed: No  Premedications: declined due request/tolerance.  Infusion Rates: over 2 hours  Approximate Infusion length:2 hours.   Labs: were drawn per orders.   Vascular access: peripheral IV placed today.  Treatment Conditions:   ~~~ NOTE: If the patient answers yes to any of the questions below, hold the infusion and contact ordering provider or on-call provider.    1. Have you recently had an elevated temperature, fever, chills, productive cough, coughing for 3 weeks or longer or hemoptysis,  abnormal vital signs, night sweats,  chest pain or have you noticed a decrease in your appetite, unexplained weight loss or fatigue? No  2. Do you have any open wounds or new incisions? No  3. Do you have any recent or upcoming hospitalizations, surgeries or dental procedures? No  4. Do you currently have or recently have had any signs of illness or infection or are you on any antibiotics? No  5. Have you had any new, sudden or worsening abdominal pain? No  6. Have you or anyone in your household received a live vaccination in the past 4 weeks? Please note:  No live vaccines while on biologic/chemotherapy until 6 months after the last treatment.  Patient can receive the flu vaccine (shot only) and the pneumovax.  It is optimal for the patient to get these vaccines mid cycle, but they can be given at any time as long as it is not on the day  of the infusion. No  7. Have you recently been diagnosed with any new nervous system diseases (ie. Multiple sclerosis, Guillain [place], seizures, neurological changes) or cancer diagnosis? Are you on any form of radiation or chemotherapy? No  8. Are you pregnant or breast feeding or do you have plans of pregnancy in the future? No  9. Have you been having any signs of worsening depression or suicidal ideations?  (benlysta only) No  10. Have there been any other new onset medical symptoms? No      Patient tolerated infusion: well.    Drug Waste Record? No     Discharge Plan:   Follow up plan of care with: ongoing [facility]usions [facility].  Discharge instructions were reviewed with patient.  Patient/representative verbalized understanding of discharge instructions and all questions answered.  Patient discharged from [facility] in stable condition.  Nicole Sinha RN       Administrations This Visit     inFLIXimab (REMICADE) 300 mg in NaCl 0.9 % 305 mL non-oncology use     Admin Date Action Dose Rate Route Administered By          09/01/2017 New Bag 300 mg 152.5 mL/hr Intravenous Nicole Sinha, KYAW                           /64  Pulse 90  Temp 97.8  F (36.6  C) (Oral)  Resp 16  Wt 66.1 kg (145 lb 12.8 oz)  BMI 23.53 kg/m2

## 2017-09-01 NOTE — PATIENT INSTRUCTIONS
Infliximab Solution for injection  What is this medicine?  INFLIXIMAB (in FLIX i mab) is used to treat Crohn's disease and ulcerative colitis. It is also used to treat ankylosing spondylitis, psoriasis, and some forms of arthritis.  This medicine may be used for other purposes; ask your health care provider or pharmacist if you have questions.  What should I tell my health care provider before I take this medicine?  They need to know if you have any of these conditions:    diabetes    exposure to tuberculosis    heart failure    hepatitis or liver disease    immune system problems    infection    lung or breathing disease, like COPD    multiple sclerosis    current or past resident of [place]    seizure disorder    an unusual or allergic reaction to infliximab, mouse proteins, other medicines, foods, dyes, or preservatives    pregnant or trying to get pregnant    breast-feeding  How should I use this medicine?  This medicine is for injection into a vein. It is usually given by a health care professional in a hospital or clinic setting.  A special MedGuide will be given to you by the pharmacist with each prescription and refill. Be sure to read this information carefully each time.  Talk to your pediatrician regarding the use of this medicine in children. Special care may be needed.  Overdosage: If you think you have taken too much of this medicine contact a poison control center or emergency room at once.  NOTE: This medicine is only for you. Do not share this medicine with others.  What if I miss a dose?  It is important not to miss your dose. Call your doctor or health care professional if you are unable to keep an appointment.  What may interact with this medicine?  Do not take this medicine with any of the following medications:    anakinra    rilonacept  This medicine may also interact with the following medications:    vaccines  This list may not describe all possible interactions.  Give your health care provider a list of all the medicines, herbs, non-prescription drugs, or dietary supplements you use. Also tell them if you smoke, drink alcohol, or use illegal drugs. Some items may interact with your medicine.  What should I watch for while using this medicine?  Visit your doctor or health care professional for regular checks on your progress.  If you get a cold or other infection while receiving this medicine, call your doctor or health care professional. Do not treat yourself. This medicine may decrease your body's ability to fight infections. Before beginning therapy, your doctor may do a test to see if you have been exposed to tuberculosis.  This medicine may make the symptoms of heart failure worse in some patients. If you notice symptoms such as increased shortness of breath or swelling of the ankles or legs, contact your health care provider right away.  If you are going to have surgery or dental work, tell your health care professional or dentist that you have received this medicine.  If you take this medicine for plaque psoriasis, stay out of the sun. If you cannot avoid being in the sun, wear protective clothing and use sunscreen. Do not use sun lamps or tanning beds/booths.  What side effects may I notice from receiving this medicine?  Side effects that you should report to your doctor or health care professional as soon as possible:    allergic reactions like skin rash, itching or hives, swelling of the face, lips, or tongue    chest pain    fever or chills, usually related to the infusion    muscle or joint pain    red, scaly patches or raised bumps on the skin    signs of infection - fever or chills, cough, sore throat, pain or difficulty passing urine    swollen lymph nodes in the neck, underarm, or groin areas    unexplained weight loss    unusual bleeding or bruising    unusually weak or tired    yellowing of the eyes or skin  Side effects that usually do not require medical  attention (report to your doctor or health care professional if they continue or are bothersome):    headache    heartburn or stomach pain    nausea, vomiting  This list may not describe all possible side effects. Call your doctor for medical advice about side effects. You may report side effects to FDA at 0-986-FDA-2940.  Where should I keep my medicine?  This drug is given in a hospital or clinic and will not be stored at home.  NOTE:This sheet is a summary. It may not cover all possible information. If you have questions about this medicine, talk to your doctor, pharmacist, or health care provider. Copyright  2016 Gold Standard

## 2017-09-01 NOTE — MR AVS SNAPSHOT
After Visit Summary   9/1/2017    Emil Garcia    MRN: [id number]           Patient Information     Date Of Birth          1990        Visit Information        Provider Department      9/1/2017 12:00 PM UC 47 ATC; UC SPEC INFUSION [facility] Specialty and Procedure        Today's Diagnoses     Crohn's disease without complication, unspecified gastrointestinal tract location (H)    -  1    Crohn's disease (H)          Care Instructions      Infliximab Solution for injection  What is this medicine?  INFLIXIMAB (in FLIX i mab) is used to treat Crohn's disease and ulcerative colitis. It is also used to treat ankylosing spondylitis, psoriasis, and some forms of arthritis.  This medicine may be used for other purposes; ask your health care provider or pharmacist if you have questions.  What should I tell my health care provider before I take this medicine?  They need to know if you have any of these conditions:    diabetes    exposure to tuberculosis    heart failure    hepatitis or liver disease    immune system problems    infection    lung or breathing disease, like COPD    multiple sclerosis    current or past resident of [place]    seizure disorder    an unusual or allergic reaction to infliximab, mouse proteins, other medicines, foods, dyes, or preservatives    pregnant or trying to get pregnant    breast-feeding  How should I use this medicine?  This medicine is for injection into a vein. It is usually given by a health care professional in a hospital or clinic setting.  A special MedGuide will be given to you by the pharmacist with each prescription and refill. Be sure to read this information carefully each time.  Talk to your pediatrician regarding the use of this medicine in children. Special care may be needed.  Overdosage: If you think you have taken too much of this medicine contact a poison control center or emergency room at  once.  NOTE: This medicine is only for you. Do not share this medicine with others.  What if I miss a dose?  It is important not to miss your dose. Call your doctor or health care professional if you are unable to keep an appointment.  What may interact with this medicine?  Do not take this medicine with any of the following medications:    anakinra    rilonacept  This medicine may also interact with the following medications:    vaccines  This list may not describe all possible interactions. Give your health care provider a list of all the medicines, herbs, non-prescription drugs, or dietary supplements you use. Also tell them if you smoke, drink alcohol, or use illegal drugs. Some items may interact with your medicine.  What should I watch for while using this medicine?  Visit your doctor or health care professional for regular checks on your progress.  If you get a cold or other infection while receiving this medicine, call your doctor or health care professional. Do not treat yourself. This medicine may decrease your body's ability to fight infections. Before beginning therapy, your doctor may do a test to see if you have been exposed to tuberculosis.  This medicine may make the symptoms of heart failure worse in some patients. If you notice symptoms such as increased shortness of breath or swelling of the ankles or legs, contact your health care provider right away.  If you are going to have surgery or dental work, tell your health care professional or dentist that you have received this medicine.  If you take this medicine for plaque psoriasis, stay out of the sun. If you cannot avoid being in the sun, wear protective clothing and use sunscreen. Do not use sun lamps or tanning beds/booths.  What side effects may I notice from receiving this medicine?  Side effects that you should report to your doctor or health care professional as soon as possible:    allergic reactions like skin rash, itching or hives,  swelling of the face, lips, or tongue    chest pain    fever or chills, usually related to the infusion    muscle or joint pain    red, scaly patches or raised bumps on the skin    signs of infection - fever or chills, cough, sore throat, pain or difficulty passing urine    swollen lymph nodes in the neck, underarm, or groin areas    unexplained weight loss    unusual bleeding or bruising    unusually weak or tired    yellowing of the eyes or skin  Side effects that usually do not require medical attention (report to your doctor or health care professional if they continue or are bothersome):    headache    heartburn or stomach pain    nausea, vomiting  This list may not describe all possible side effects. Call your doctor for medical advice about side effects. You may report side effects to FDA at 9-222-KQA-9810.  Where should I keep my medicine?  This drug is given in a hospital or clinic and will not be stored at home.  NOTE:This sheet is a summary. It may not cover all possible information. If you have questions about this medicine, talk to your doctor, pharmacist, or health care provider. Copyright  2016 Gold Standard                Follow-ups after your visit        Your next 10 appointments already scheduled     Sep 08, 2017 12:30 PM CDT   (Arrive by 12:15 PM)   Return Visit with Eric Orellana PA-C   [facility] Gastroenterology and IBD [facility] ([facility])    [address] [place] [address]  [address] [facility] [address] [phone]            Oct 27, 2017 12:00 PM CDT   Infusion 180 with UC SPEC INFUSION, UC 47 ATC   [facility] Specialty and Procedure ([facility])    [address] [place] [address]  [address]nd [facility] [address] [phone]              Who to contact     If you have questions or need follow up information about today's clinic visit or your schedule please contact [facility] SPECIALTY AND  PROCEDURE directly at [phone].  Normal or non-critical lab and imaging results will be communicated to you by [employer], letter or phone within 4 business days after the clinic has received the results. If you do not hear from us within 7 days, please contact the clinic through [employer] or phone. If you have a critical or abnormal lab result, we will notify you by phone as soon as possible.  Submit refill requests through [employer] or call your pharmacy and they will forward the refill request to us. Please allow 3 business days for your refill to be completed.          Additional Information About Your Visit        [employer] Information     [employer] gives you secure access to your electronic health record. If you see a primary care provider, you can also send messages to your care team and make appointments. If you have questions, please call your primary care clinic.  If you do not have a primary care provider, please call [phone] and they will assist you.        Care EveryWhere ID     This is your Care EveryWhere ID. This could be used by other organizations to access your [facility] medical records  [id number]        Your Vitals Were     Pulse Temperature Respirations BMI (Body Mass Index)          96 97.8  F (36.6  C) (Oral) 16 23.53 kg/m2         Blood Pressure from Last 3 Encounters:   09/01/17 111/72   07/07/17 122/78   05/12/17 101/67    Weight from Last 3 Encounters:   09/01/17 66.1 kg (145 lb 12.8 oz)   07/07/17 67.2 kg (148 lb 3.2 oz)   05/12/17 68.1 kg (150 lb 1.6 oz)              We Performed the Following     CBC with platelets differential     CRP inflammation     Erythrocyte sedimentation rate auto     Hepatic panel        Primary Care Provider    Physician No Ref-Primary       No address on file        Equal Access to Services     HAYLEE ALANIS : Shahla Pereira, cecil yuan, beatris pedroza. [facility] [phone].    ATENCIÓN:  Si habla roberto, tiene a perkins disposición servicios gratuitos de asistencia lingüística. Richard newell [phone].    We comply with applicable federal civil rights laws and [place] laws. We do not discriminate on the basis of race, color, national origin, age, disability sex, sexual orientation or gender identity.            Thank you!     Thank you for choosing [facility] SPECIALTY AND PROCEDURE  for your care. Our goal is always to provide you with excellent care. Hearing back from our patients is one way we can continue to improve our services. Please take a few minutes to complete the written survey that you may receive in the mail after your visit with us. Thank you!             Your Updated Medication List - Protect others around you: Learn how to safely use, store and throw away your medicines at www.disposemymeds.org.          This list is accurate as of: 9/1/17  2:37 PM.  Always use your most recent med list.                   Brand Name Dispense Instructions for use Diagnosis    inFLIXimab 100 MG injection    REMICADE     100 mg IV every 8 weeks        MULTIVITAL PO      Take  by mouth.

## 2017-09-08 ENCOUNTER — OFFICE VISIT (OUTPATIENT)
Dept: GASTROENTEROLOGY | Facility: CLINIC | Age: 27
End: 2017-09-08

## 2017-09-08 VITALS
HEART RATE: 102 BPM | SYSTOLIC BLOOD PRESSURE: 100 MMHG | DIASTOLIC BLOOD PRESSURE: 63 MMHG | WEIGHT: 150 LBS | OXYGEN SATURATION: 97 % | HEIGHT: 67 IN | BODY MASS INDEX: 23.54 KG/M2

## 2017-09-08 DIAGNOSIS — K50.10 CROHN'S DISEASE OF LARGE INTESTINE WITHOUT COMPLICATION (H): Primary | ICD-10-CM

## 2017-09-08 ASSESSMENT — ENCOUNTER SYMPTOMS
SINUS CONGESTION: 1
NAIL CHANGES: 0
SKIN CHANGES: 0
SMELL DISTURBANCE: 0
SINUS PAIN: 1
TROUBLE SWALLOWING: 0
TASTE DISTURBANCE: 0
HOARSE VOICE: 0
POOR WOUND HEALING: 0
SORE THROAT: 1
NECK MASS: 0

## 2017-09-08 ASSESSMENT — PAIN SCALES - GENERAL: PAINLEVEL: NO PAIN (0)

## 2017-09-08 NOTE — LETTER
9/8/2017       RE: Emil Garcia  [address] [address]     Dear Colleague,    Thank you for referring your patient, Emil Garcia, to the [facility] GASTROENTEROLOGY AND IBD CLINIC at [facility]. Please see a copy of my visit note below.    IBD CLINIC VISIT     CC/REFERRING MD:  Self referred  REASON FOR FOLLOW UP: Crohn's   COLLABORATING PHYSICIAN: Jim Patten MD    IBD HISTORY  Age at diagnosis: 23, 2014  Extent of disease: R colon/cecum  Disease phenotype: inflammation  Cathy-anal disease: None  Current CD medications: Inflixumab 5mg/kg q8 weeks, next infusion is Oct 22, 2017  Prior CD surgeries: None  Prior IBD Medications: Budesonide    DRUG MONITORING  TPMT enzyme activity: --    6-TGN/6-MMPN levels: --    Biologic concentration:  3/17/17 Inflixumab level 5.1     DISEASE ASSESSMENT  Labs:  Lab Results   Component Value Date    ALBUMIN 4.0 09/01/2017     Recent Labs   Lab Test  09/01/17   1215  07/07/17   1235   CRP  <2.9  <2.9   SED  8  8     Endoscopic assessment: Colonoscopy in 2/2016 with mild chronic active colitis in R colon/cecum and normal histology on remainder of random colon biopsies  Enterography: --  Fecal calprotectin: --   C diff: --    ASSESSMENT/PLAN  Emil is a 26-year-old male with history of colonic Crohn's disease, currently in clinical and endoscopic remission on infliximab 5 mg/kg every 8 weeks.      1.  Colonic Crohn's disease.  The patient is in clinical remission at this time.  His level that was obtained earlier this year for Remicade was 5.1 with no evidence of antibodies which is a sufficient level.  He does continue to have some sense of incomplete evacuation that we will continue to monitor over time, although it appears that he is endoscopically in remission     -- Laboratory studies every other infusion.    -- Plan to obtain yearly tuberculosis test at the next infusion and also hepatitis serologies, as I do not  have these on record of immunity.     2. IBD healthcare maintenance based on patients current medication:  Anti-TNF medication therapy (Inflixumab)    Vaccinations:  -- Influenza (every year): Last given 2016  -- TdaP (every 10 years): Last given 2009  -- Pneumococcal Pneumonia (once then every 5 years): Last given 2015  -- Yearly assessment for latent Tb (verbal screening and exam, PPD or QuantiFERON-Tb testing): Will obtain today     One time confirmation of immunity or serologies:  -- Hepatitis A (serologies or immunizations): Not documented  -- Hepatitis B (serologies or immunizations): Not documented  -- Varicella: Not documented  -- MMR:2004  -- HPV (all aged 18-26): Not documented  -- Meningococcal meningitis (all patients at risk for meningitis): 2007, 2012   -- Due to the immunosuppression in this patient, I would not advise administration of live vaccines such as varicella/VZV, intranasal influenza, MMR, or yellow fever vaccine (if travelling).      Bone mineral density screening   -- Recommend all patients supplement with calcium and vitamin D  -- Given prior steroid use recommend DEXA if not already done    Cancer Screening:  Colon cancer screening:  Colonoscopy every 2-3 years recommended after 8 years of disease.  Dysplasia screening is recommended 2022.    Cervical cancer screening: N/A    Skin cancer screening: Annual visual exam of skin by dermatologist since patient is immunocompromised    Depression Screening:  -- Over the last month, have you felt down, depressed, or hopeless? No  -- Over the last month, have you felt little interest or pleasure doing things? No    Misc:  -- Avoid tobacco use  -- Avoid NSAIDs as there is potentially a 25% chance of causing an IBD flare      RTC 3 months    Thank you for this consultation.  It was a pleasure to participate in the care of this patient; please contact us with any further questions.      Eric Orellana PA-C  Division of Gastroenterology, Hepatology and  [facility]      HPI:   Emil is a 26-year-old male with history of right colon Crohn's disease diagnosed in 2014.  Briefly, his history includes bowel issues since 2011 and was evaluated and diagnosed with C. diff colitis, treated with Flagyl and improved.  He also had a colonoscopy at that time that apparently showed acute colitis.  No changes of chronic inflammation.  Then 18 months later, he had abdominal pain and was again diagnosed with C. diff colitis.  He was prescribed vancomycin and again improved.  He had some postinfectious IBS symptoms that were treated with Bentyl.  Symptoms continued to persist.  In 2014, he had a colonoscopy that showed changes consistent with chronic active inflammatory bowel disease due to granulomas in biopsy specimen.  This was felt to represent Crohn's disease.  He was initially started on budesonide for a period of time, 1-2 weeks, but due to persistent symptoms, he was initiated on Remicade therapy at 5 mg/kg every 8 weeks.  He has had significant improvement in symptoms in resolution of his abdominal pain.  He currently has 2-3 bowel movements per day.  He denies any hematochezia.  Does have some tenesmus with a sense of incomplete evacuation which is unchanged since last visit.  He does not have any urgency and has not had any accidents or nighttime Stools.  The only symptom that he reports today is occasionally feeling itchy before and after the shower which improves with applying lotion.   Patient denies nausea, vomiting, fever/chills, BRBPR, melena, urgency, hesitancy passing flatus, recent weight loss, join pain, skin changes, vision changes and nightime stools    ROS:    No fevers or chills  No weight loss  No blurry vision, double vision or change in vision  No sore throat  No lymphadenopathy  No headache, paraesthesias, or weakness in a limb  No shortness of breath or wheezing  No chest pain or pressure  No arthralgias or myalgias  No rashes  "or skin changes  No odynophagia or dysphagia  No BRBPR, hematochezia, melena  No dysuria, frequency or urgency  No hot/cold intolerance or polyria  No anxiety or depression    Extra intestinal manifestations of IBD:  No uveitis/episcleritis  No aphthous ulcers   No arthritis   No erythema nodosum/pyoderma gangrenosum.     PERTINENT PAST MEDICAL HISTORY:  Past Medical History:   Diagnosis Date     Ulcerative colitis (H)        PREVIOUS SURGERIES:  None    PREVIOUS ENDOSCOPY:  Colonoscopy in 2/2016 with mild chronic active colitis in R colon/cecum and normal histology on remainder of random colon biopsies    ALLERGIES:     Allergies   Allergen Reactions     Nsaids      Seasonal Allergies        PERTINENT MEDICATIONS:    Current Outpatient Prescriptions:      inFLIXimab (REMICADE) 100 MG injection, 100 mg IV every 8 weeks, Disp: , Rfl:      Multiple Vitamins-Minerals (MULTIVITAL PO), Take  by mouth., Disp: , Rfl:     SOCIAL HISTORY:  Social History     Social History     Marital status: Single     Spouse name: N/A     Number of children: N/A     Years of education: N/A     Occupational History     Not on file.     Social History Main Topics     Smoking status: Never Smoker     Smokeless tobacco: Not on file     Alcohol use No     Drug use: No     Sexual activity: Not on file     Other Topics Concern     Not on file     Social History Narrative       FAMILY HISTORY:  Family History   Problem Relation Age of Onset     Macular Degeneration Paternal Grandfather      Glaucoma No family hx of        Past/family/social history reviewed and no changes    PHYSICAL EXAMINATION:  Constitutional: aaox3, cooperative, pleasant, not dyspneic/diaphoretic, no acute distress  Vitals reviewed: /63  Pulse 102  Ht 1.702 m (5' 7\")  Wt 68 kg (150 lb)  SpO2 97%  BMI 23.49 kg/m2  Wt:   Wt Readings from Last 2 Encounters:   09/01/17 66.1 kg (145 lb 12.8 oz)   07/07/17 67.2 kg (148 lb 3.2 oz)      Eyes: Sclera " anicteric/injected  Ears/nose/mouth/throat: Normal oropharynx without ulcers or exudate, mucus membranes moist, hearing intact  Neck: supple, thyroid normal size  CV: No edema  Respiratory: Unlabored breathing  Lymph: No axillary, submandibular, supraclavicular or inguinal lymphadenopathy  Abd: Nondistended, +bs, no hepatosplenomegaly, nontender, no peritoneal signs  Skin: warm, perfused, no jaundice  Psych: Normal affect  MSK: Normal gait      PERTINENT STUDIES:  Most recent CBC:  Recent Labs   Lab Test  09/01/17   1215  07/07/17   1235   WBC  7.6  6.3   HGB  16.5  15.1   HCT  45.4  42.9   PLT  322  305     Most recent hepatic panel:  Recent Labs   Lab Test  09/01/17   1215  07/07/17   1235   ALT  58  35   AST  32  16     Most recent creatinine:  Recent Labs   Lab Test  04/06/11   2315   CR  0.90       Again, thank you for allowing me to participate in the care of your patient.      Sincerely,    Eric Orellana PA-C

## 2017-09-08 NOTE — NURSING NOTE
"Chief Complaint   Patient presents with     RECHECK     crohns       Vitals:    09/08/17 1228   BP: 100/63   Pulse: 102   SpO2: 97%   Weight: 68 kg (150 lb)   Height: 1.702 m (5' 7\")       Body mass index is 23.49 kg/(m^2).                            "

## 2017-09-08 NOTE — PROGRESS NOTES
IBD CLINIC VISIT     CC/REFERRING MD:  Self referred  REASON FOR FOLLOW UP: Crohn's   COLLABORATING PHYSICIAN: Jim Patten MD    IBD HISTORY  Age at diagnosis: 23, 2014  Extent of disease: R colon/cecum  Disease phenotype: inflammation  Cathy-anal disease: None  Current CD medications: Inflixumab 5mg/kg q8 weeks, next infusion is Oct 22, 2017  Prior CD surgeries: None  Prior IBD Medications: Budesonide    DRUG MONITORING  TPMT enzyme activity: --    6-TGN/6-MMPN levels: --    Biologic concentration:  3/17/17 Inflixumab level 5.1     DISEASE ASSESSMENT  Labs:  Lab Results   Component Value Date    ALBUMIN 4.0 09/01/2017     Recent Labs   Lab Test  09/01/17   1215  07/07/17   1235   CRP  <2.9  <2.9   SED  8  8     Endoscopic assessment: Colonoscopy in 2/2016 with mild chronic active colitis in R colon/cecum and normal histology on remainder of random colon biopsies  Enterography: --  Fecal calprotectin: --   C diff: --    ASSESSMENT/PLAN  Emil is a 26-year-old male with history of colonic Crohn's disease, currently in clinical and endoscopic remission on infliximab 5 mg/kg every 8 weeks.      1.  Colonic Crohn's disease.  The patient is in clinical remission at this time.  His level that was obtained earlier this year for Remicade was 5.1 with no evidence of antibodies which is a sufficient level.  He does continue to have some sense of incomplete evacuation that we will continue to monitor over time, although it appears that he is endoscopically in remission     -- Laboratory studies every other infusion.    -- Plan to obtain yearly tuberculosis test at the next infusion and also hepatitis serologies, as I do not have these on record of immunity.     2. IBD healthcare maintenance based on patients current medication:  Anti-TNF medication therapy (Inflixumab)    Vaccinations:  -- Influenza (every year): Last given 2016  -- TdaP (every 10 years): Last given 2009  -- Pneumococcal Pneumonia (once then every 5  years): Last given 2015  -- Yearly assessment for latent Tb (verbal screening and exam, PPD or QuantiFERON-Tb testing): Will obtain today     One time confirmation of immunity or serologies:  -- Hepatitis A (serologies or immunizations): Not documented  -- Hepatitis B (serologies or immunizations): Not documented  -- Varicella: Not documented  -- MMR:2004  -- HPV (all aged 18-26): Not documented  -- Meningococcal meningitis (all patients at risk for meningitis): 2007, 2012   -- Due to the immunosuppression in this patient, I would not advise administration of live vaccines such as varicella/VZV, intranasal influenza, MMR, or yellow fever vaccine (if travelling).      Bone mineral density screening   -- Recommend all patients supplement with calcium and vitamin D  -- Given prior steroid use recommend DEXA if not already done    Cancer Screening:  Colon cancer screening:  Colonoscopy every 2-3 years recommended after 8 years of disease.  Dysplasia screening is recommended 2022.    Cervical cancer screening: N/A    Skin cancer screening: Annual visual exam of skin by dermatologist since patient is immunocompromised    Depression Screening:  -- Over the last month, have you felt down, depressed, or hopeless? No  -- Over the last month, have you felt little interest or pleasure doing things? No    Misc:  -- Avoid tobacco use  -- Avoid NSAIDs as there is potentially a 25% chance of causing an IBD flare      RTC 3 months    Thank you for this consultation.  It was a pleasure to participate in the care of this patient; please contact us with any further questions.      Eric Orellana PA-C  Division of Gastroenterology, Hepatology and Nutrition  [facility]      HPI:   Emil is a 26-year-old male with history of right colon Crohn's disease diagnosed in 2014.  Briefly, his history includes bowel issues since 2011 and was evaluated and diagnosed with C. diff colitis, treated with Flagyl and improved.  He also had  a colonoscopy at that time that apparently showed acute colitis.  No changes of chronic inflammation.  Then 18 months later, he had abdominal pain and was again diagnosed with C. diff colitis.  He was prescribed vancomycin and again improved.  He had some postinfectious IBS symptoms that were treated with Bentyl.  Symptoms continued to persist.  In 2014, he had a colonoscopy that showed changes consistent with chronic active inflammatory bowel disease due to granulomas in biopsy specimen.  This was felt to represent Crohn's disease.  He was initially started on budesonide for a period of time, 1-2 weeks, but due to persistent symptoms, he was initiated on Remicade therapy at 5 mg/kg every 8 weeks.  He has had significant improvement in symptoms in resolution of his abdominal pain.  He currently has 2-3 bowel movements per day.  He denies any hematochezia.  Does have some tenesmus with a sense of incomplete evacuation which is unchanged since last visit.  He does not have any urgency and has not had any accidents or nighttime Stools.  The only symptom that he reports today is occasionally feeling itchy before and after the shower which improves with applying lotion.   Patient denies nausea, vomiting, fever/chills, BRBPR, melena, urgency, hesitancy passing flatus, recent weight loss, join pain, skin changes, vision changes and nightime stools    ROS:    No fevers or chills  No weight loss  No blurry vision, double vision or change in vision  No sore throat  No lymphadenopathy  No headache, paraesthesias, or weakness in a limb  No shortness of breath or wheezing  No chest pain or pressure  No arthralgias or myalgias  No rashes or skin changes  No odynophagia or dysphagia  No BRBPR, hematochezia, melena  No dysuria, frequency or urgency  No hot/cold intolerance or polyria  No anxiety or depression    Extra intestinal manifestations of IBD:  No uveitis/episcleritis  No aphthous ulcers   No arthritis   No erythema  "nodosum/pyoderma gangrenosum.     PERTINENT PAST MEDICAL HISTORY:  Past Medical History:   Diagnosis Date     Ulcerative colitis (H)        PREVIOUS SURGERIES:  None    PREVIOUS ENDOSCOPY:  Colonoscopy in 2/2016 with mild chronic active colitis in R colon/cecum and normal histology on remainder of random colon biopsies    ALLERGIES:     Allergies   Allergen Reactions     Nsaids      Seasonal Allergies        PERTINENT MEDICATIONS:    Current Outpatient Prescriptions:      inFLIXimab (REMICADE) 100 MG injection, 100 mg IV every 8 weeks, Disp: , Rfl:      Multiple Vitamins-Minerals (MULTIVITAL PO), Take  by mouth., Disp: , Rfl:     SOCIAL HISTORY:  Social History     Social History     Marital status: Single     Spouse name: N/A     Number of children: N/A     Years of education: N/A     Occupational History     Not on file.     Social History Main Topics     Smoking status: Never Smoker     Smokeless tobacco: Not on file     Alcohol use No     Drug use: No     Sexual activity: Not on file     Other Topics Concern     Not on file     Social History Narrative       FAMILY HISTORY:  Family History   Problem Relation Age of Onset     Macular Degeneration Paternal Grandfather      Glaucoma No family hx of        Past/family/social history reviewed and no changes    PHYSICAL EXAMINATION:  Constitutional: aaox3, cooperative, pleasant, not dyspneic/diaphoretic, no acute distress  Vitals reviewed: /63  Pulse 102  Ht 1.702 m (5' 7\")  Wt 68 kg (150 lb)  SpO2 97%  BMI 23.49 kg/m2  Wt:   Wt Readings from Last 2 Encounters:   09/01/17 66.1 kg (145 lb 12.8 oz)   07/07/17 67.2 kg (148 lb 3.2 oz)      Eyes: Sclera anicteric/injected  Ears/nose/mouth/throat: Normal oropharynx without ulcers or exudate, mucus membranes moist, hearing intact  Neck: supple, thyroid normal size  CV: No edema  Respiratory: Unlabored breathing  Lymph: No axillary, submandibular, supraclavicular or inguinal lymphadenopathy  Abd: Nondistended, +bs, " no hepatosplenomegaly, nontender, no peritoneal signs  Skin: warm, perfused, no jaundice  Psych: Normal affect  MSK: Normal gait      PERTINENT STUDIES:  Most recent CBC:  Recent Labs   Lab Test  09/01/17   1215  07/07/17   1235   WBC  7.6  6.3   HGB  16.5  15.1   HCT  45.4  42.9   PLT  322  305     Most recent hepatic panel:  Recent Labs   Lab Test  09/01/17   1215  07/07/17   1235   ALT  58  35   AST  32  16     Most recent creatinine:  Recent Labs   Lab Test  04/06/11   2315   CR  0.90               Answers for HPI/ROS submitted by the patient on 9/8/2017   General Symptoms: No  Skin Symptoms: Yes  HENT Symptoms: Yes  EYE SYMPTOMS: No  HEART SYMPTOMS: No  LUNG SYMPTOMS: No  INTESTINAL SYMPTOMS: No  URINARY SYMPTOMS: No  REPRODUCTIVE SYMPTOMS: No  SKELETAL SYMPTOMS: No  BLOOD SYMPTOMS: No  NERVOUS SYSTEM SYMPTOMS: No  MENTAL HEALTH SYMPTOMS: No  Changes in hair: No  Changes in moles/birth marks: No  Itching: Yes  Rashes: Yes  Changes in nails: No  Acne: No  Change in facial hair: No  Warts: No  Non-healing sores: No  Scarring: No  Flaking of skin: No  Color changes of hands/feet in cold : No  Sun sensitivity: No  Skin thickening: No  Ear pain: No  Ear discharge: No  Hearing loss: No  Tinnitus: No  Nosebleeds: Yes  Congestion: Yes  Sinus pain: Yes  Trouble swallowing: No   Voice hoarseness: No  Mouth sores: No  Sore throat: Yes  Tooth pain: No  Gum tenderness: No  Bleeding gums: No  Change in taste: No  Change in sense of smell: No  Dry mouth: No  Hearing aid used: No  Neck lump: No

## 2017-09-08 NOTE — MR AVS SNAPSHOT
After Visit Summary   9/8/2017    Emil Garcia    MRN: [id number]           Patient Information     Date Of Birth          1990        Visit Information        Provider Department      9/8/2017 12:30 PM Eric Orellana PA-C [facility]        Today's Diagnoses     Crohn's disease of large intestine without complication (H)    -  1      Care Instructions    It was a pleasure taking care of you today.  I've included a brief summary of our discussion and care plan from today's visit below.  Please review this information with your primary care provider.  ______________________________________________________________________    My recommendations are summarized as follows:    --  Continue Remicade every 8 weeks  --  Labs with every other infusion  --  Recommend 100 units of vitamin D and 500 mg calcium twice a day  --  We will obtain your hepatitis titers and TB test next blood draw with your infusion    Return to GI Clinic in 3-6 months to review your progress.    ______________________________________________________________________    Who do I call with any questions after my visit?  Please be in touch if there are any further questions that arise following today's visit.  There are multiple ways to contact your gastroenterology care team.        During business hours, you may reach a Gastroenterology nurse at [phone].       To schedule or reschedule an appointment, please call [phone].       You can always send a secure message through [employer].  [employer] messages are answered by your nurse or doctor typically within 24 hours.  Please allow extra time on weekends and holidays.        For urgent/emergent questions after business hours, you may reach the on-call GI Fellow by contacting the [facility] at [phone].     How will I get the results of any tests ordered?    You will receive all of your results.  If you have signed up for  MyChart, any tests ordered at your visit will be available to you after your physician reviews them.  Typically this takes 1-2 weeks.  If there are urgent results that require a change in your care plan, your physician or nurse will call you to discuss the next steps.      What is [employer]?  [email or web address] is a secure way for you to access all of your healthcare records from the [facility].  It is a web based computer program, so you can sign on to it from any location.  It also allows you to send secure messages to your care team.  I recommend signing up for [employer] access if you have not already done so and are comfortable with using a computer.      How to I schedule a follow-up visit?  If you did not schedule a follow-up visit today, please call [phone] to schedule a follow-up office visit.        Sincerely,    Eric Orellana PA-C  [facility]  Division of Gastroenterology                Follow-ups after your visit        Additional Services     DERMATOLOGY REFERRAL       Your provider has referred you to: [facility]: Dermatology Clinic [facility] [phone]   [email or web address]    Patient is on chronic immunosuppressant and requires yearly skin check    Please be aware that coverage of these services is subject to the terms and limitations of your health insurance plan.  Call member services at your health plan with any benefit or coverage questions.      Please bring the following with you to your appointment:    (1) Any X-Rays, CTs or MRIs which have been performed.  Contact the facility where they were done to arrange for  prior to your scheduled appointment.    (2) List of current medications  (3) This referral request   (4) Any documents/labs given to you for this referral                  Follow-up notes from your care team     Return in about 3 months (around 12/8/2017).      Your next 10 appointments already scheduled     Oct 27, 2017 12:00  "PM CDT   Infusion 180 with UC SPEC INFUSION, UC 47 ATC   [facility] ([facility])    [address]  [address]  [place] [address] [phone]              Who to contact     Please call your clinic at [phone] to:    Ask questions about your health    Make or cancel appointments    Discuss your medicines    Learn about your test results    Speak to your doctor   If you have compliments or concerns about an experience at your clinic, or if you wish to file a complaint, please contact [facility] Patient Relations at [phone] or email us at [email or web address]         Additional Information About Your Visit        [employer] Information     [employer] gives you secure access to your electronic health record. If you see a primary care provider, you can also send messages to your care team and make appointments. If you have questions, please call your primary care clinic.  If you do not have a primary care provider, please call [phone] and they will assist you.      [employer] is an electronic gateway that provides easy, online access to your medical records. With [employer], you can request a clinic appointment, read your test results, renew a prescription or communicate with your care team.     To access your existing account, please contact your [facility] Clinic or call [phone] for assistance.        Care EveryWhere ID     This is your Care EveryWhere ID. This could be used by other organizations to access your [facility] medical records  [id number]        Your Vitals Were     Pulse Height Pulse Oximetry BMI (Body Mass Index)          102 1.702 m (5' 7\") 97% 23.49 kg/m2         Blood Pressure from Last 3 Encounters:   09/08/17 100/63   09/01/17 111/72   07/07/17 122/78    Weight from Last 3 Encounters:   09/08/17 68 kg (150 lb)   09/01/17 66.1 kg (145 lb 12.8 " oz)   07/07/17 67.2 kg (148 lb 3.2 oz)              We Performed the Following     DERMATOLOGY REFERRAL        Primary Care Provider    Physician No Ref-Primary       No address on file        Equal Access to Services     HAYLEE ZEKE : Shahla dax lucio tawanna Pereira, cecil yuan, batsheva kachun fried, beatris montillasusan nilton. So [facility] [phone].    ATENCIÓN: Si habla español, tiene a perkins disposición servicios gratuitos de asistencia lingüística. Llame al [phone].    We comply with applicable federal civil rights laws and [place] laws. We do not discriminate on the basis of race, color, national origin, age, disability sex, sexual orientation or gender identity.            Thank you!     Thank you for choosing [facility]  for your care. Our goal is always to provide you with excellent care. Hearing back from our patients is one way we can continue to improve our services. Please take a few minutes to complete the written survey that you may receive in the mail after your visit with us. Thank you!             Your Updated Medication List - Protect others around you: Learn how to safely use, store and throw away your medicines at www.disposemymeds.org.          This list is accurate as of: 9/8/17 12:59 PM.  Always use your most recent med list.                   Brand Name Dispense Instructions for use Diagnosis    BENADRYL PO      Take 50 mg by mouth 2 times daily        inFLIXimab 100 MG injection    REMICADE     100 mg IV every 8 weeks        MULTIVITAL PO      Take  by mouth.

## 2017-09-08 NOTE — PATIENT INSTRUCTIONS
It was a pleasure taking care of you today.  I've included a brief summary of our discussion and care plan from today's visit below.  Please review this information with your primary care provider.  ______________________________________________________________________    My recommendations are summarized as follows:    --  Continue Remicade every 8 weeks  --  Labs with every other infusion  --  Recommend 100 units of vitamin D and 500 mg calcium twice a day  --  We will obtain your hepatitis titers and TB test next blood draw with your infusion    Return to GI Clinic in 3-6 months to review your progress.    ______________________________________________________________________    Who do I call with any questions after my visit?  Please be in touch if there are any further questions that arise following today's visit.  There are multiple ways to contact your gastroenterology care team.        During business hours, you may reach a Gastroenterology nurse at [phone].       To schedule or reschedule an appointment, please call [phone].       You can always send a secure message through [employer].  [employer] messages are answered by your nurse or doctor typically within 24 hours.  Please allow extra time on weekends and holidays.        For urgent/emergent questions after business hours, you may reach the on-call GI Fellow by contacting the [facility]  at [phone].     How will I get the results of any tests ordered?    You will receive all of your results.  If you have signed up for [employer], any tests ordered at your visit will be available to you after your physician reviews them.  Typically this takes 1-2 weeks.  If there are urgent results that require a change in your care plan, your physician or nurse will call you to discuss the next steps.      What is [employer]?  [employer] is a secure way for you to access all of your healthcare records from the [facility].  It is a web  based computer program, so you can sign on to it from any location.  It also allows you to send secure messages to your care team.  I recommend signing up for [employer] access if you have not already done so and are comfortable with using a computer.      How to I schedule a follow-up visit?  If you did not schedule a follow-up visit today, please call [phone] to schedule a follow-up office visit.        Sincerely,    Eric Orellana PA-C  [facility]  Division of Gastroenterology

## 2017-09-14 ENCOUNTER — CARE COORDINATION (OUTPATIENT)
Dept: GASTROENTEROLOGY | Facility: CLINIC | Age: 27
End: 2017-09-14

## 2017-09-14 NOTE — PROGRESS NOTES
Pt called to state that his prior authorization for his remicade will  before  his next infusion.  Also edited therapy plan to add labs to be drawn with next infusion.  Will route message to prior team about his remicade needing to be renewed.

## 2017-10-27 ENCOUNTER — INFUSION THERAPY VISIT (OUTPATIENT)
Dept: INFUSION THERAPY | Facility: CLINIC | Age: 27
End: 2017-10-27
Attending: INTERNAL MEDICINE
Payer: COMMERCIAL

## 2017-10-27 VITALS
RESPIRATION RATE: 16 BRPM | TEMPERATURE: 97.5 F | WEIGHT: 147.2 LBS | DIASTOLIC BLOOD PRESSURE: 73 MMHG | SYSTOLIC BLOOD PRESSURE: 115 MMHG | OXYGEN SATURATION: 97 % | HEART RATE: 83 BPM | BODY MASS INDEX: 23.05 KG/M2

## 2017-10-27 DIAGNOSIS — K50.90 CROHN'S DISEASE WITHOUT COMPLICATION, UNSPECIFIED GASTROINTESTINAL TRACT LOCATION (H): Primary | ICD-10-CM

## 2017-10-27 DIAGNOSIS — K50.10 CROHN'S DISEASE OF LARGE INTESTINE WITHOUT COMPLICATION (H): ICD-10-CM

## 2017-10-27 DIAGNOSIS — K50.90 CROHN'S DISEASE (H): ICD-10-CM

## 2017-10-27 LAB
ALBUMIN SERPL-MCNC: 3.8 G/DL (ref 3.4–5)
ALP SERPL-CCNC: 76 U/L (ref 40–150)
ALT SERPL W P-5'-P-CCNC: 52 U/L (ref 0–70)
AST SERPL W P-5'-P-CCNC: 26 U/L (ref 0–45)
BASOPHILS # BLD AUTO: 0 10E9/L (ref 0–0.2)
BASOPHILS NFR BLD AUTO: 0.3 %
BILIRUB DIRECT SERPL-MCNC: 0.2 MG/DL (ref 0–0.2)
BILIRUB SERPL-MCNC: 1.2 MG/DL (ref 0.2–1.3)
CRP SERPL-MCNC: <2.9 MG/L (ref 0–8)
DIFFERENTIAL METHOD BLD: NORMAL
EOSINOPHIL # BLD AUTO: 0.2 10E9/L (ref 0–0.7)
EOSINOPHIL NFR BLD AUTO: 2 %
ERYTHROCYTE [DISTWIDTH] IN BLOOD BY AUTOMATED COUNT: 11.7 % (ref 10–15)
ERYTHROCYTE [SEDIMENTATION RATE] IN BLOOD BY WESTERGREN METHOD: 8 MM/H (ref 0–15)
HBV CORE AB SERPL QL IA: NONREACTIVE
HBV SURFACE AB SERPL IA-ACNC: 4.42 M[IU]/ML
HBV SURFACE AG SERPL QL IA: NONREACTIVE
HCT VFR BLD AUTO: 44.9 % (ref 40–53)
HCV AB SERPL QL IA: NONREACTIVE
HGB BLD-MCNC: 16 G/DL (ref 13.3–17.7)
IMM GRANULOCYTES # BLD: 0 10E9/L (ref 0–0.4)
IMM GRANULOCYTES NFR BLD: 0.3 %
LYMPHOCYTES # BLD AUTO: 2.9 10E9/L (ref 0.8–5.3)
LYMPHOCYTES NFR BLD AUTO: 39.6 %
MCH RBC QN AUTO: 31.9 PG (ref 26.5–33)
MCHC RBC AUTO-ENTMCNC: 35.6 G/DL (ref 31.5–36.5)
MCV RBC AUTO: 90 FL (ref 78–100)
MONOCYTES # BLD AUTO: 0.5 10E9/L (ref 0–1.3)
MONOCYTES NFR BLD AUTO: 7.3 %
NEUTROPHILS # BLD AUTO: 3.7 10E9/L (ref 1.6–8.3)
NEUTROPHILS NFR BLD AUTO: 50.5 %
NRBC # BLD AUTO: 0 10*3/UL
NRBC BLD AUTO-RTO: 0 /100
PLATELET # BLD AUTO: 330 10E9/L (ref 150–450)
PROT SERPL-MCNC: 7.7 G/DL (ref 6.8–8.8)
RBC # BLD AUTO: 5.01 10E12/L (ref 4.4–5.9)
WBC # BLD AUTO: 7.4 10E9/L (ref 4–11)

## 2017-10-27 PROCEDURE — 86480 TB TEST CELL IMMUN MEASURE: CPT | Performed by: PHYSICIAN ASSISTANT

## 2017-10-27 PROCEDURE — 87340 HEPATITIS B SURFACE AG IA: CPT | Performed by: INTERNAL MEDICINE

## 2017-10-27 PROCEDURE — 96413 CHEMO IV INFUSION 1 HR: CPT

## 2017-10-27 PROCEDURE — 80076 HEPATIC FUNCTION PANEL: CPT | Performed by: INTERNAL MEDICINE

## 2017-10-27 PROCEDURE — 86803 HEPATITIS C AB TEST: CPT | Performed by: INTERNAL MEDICINE

## 2017-10-27 PROCEDURE — 86704 HEP B CORE ANTIBODY TOTAL: CPT | Performed by: INTERNAL MEDICINE

## 2017-10-27 PROCEDURE — 36415 COLL VENOUS BLD VENIPUNCTURE: CPT

## 2017-10-27 PROCEDURE — 25000128 H RX IP 250 OP 636: Mod: ZF | Performed by: INTERNAL MEDICINE

## 2017-10-27 PROCEDURE — 85025 COMPLETE CBC W/AUTO DIFF WBC: CPT | Performed by: INTERNAL MEDICINE

## 2017-10-27 PROCEDURE — 86706 HEP B SURFACE ANTIBODY: CPT | Performed by: INTERNAL MEDICINE

## 2017-10-27 PROCEDURE — 86140 C-REACTIVE PROTEIN: CPT | Performed by: INTERNAL MEDICINE

## 2017-10-27 PROCEDURE — 85652 RBC SED RATE AUTOMATED: CPT | Performed by: INTERNAL MEDICINE

## 2017-10-27 RX ORDER — ACETAMINOPHEN 325 MG/1
650 TABLET ORAL ONCE
Status: CANCELLED
Start: 2017-10-27 | End: 2017-10-27

## 2017-10-27 RX ORDER — DIPHENHYDRAMINE HCL 25 MG
25 CAPSULE ORAL ONCE
Status: CANCELLED
Start: 2017-10-27 | End: 2017-10-27

## 2017-10-27 RX ADMIN — INFLIXIMAB 300 MG: 100 INJECTION, POWDER, LYOPHILIZED, FOR SOLUTION INTRAVENOUS at 12:51

## 2017-10-27 NOTE — PROGRESS NOTES
Nursing Note  Emil Garcia [facility]:   Chief Complaint   Patient presents with     Remicade Infusion     labs     During today's [facility] appointment, orders from Dr. martinez were completed.  Frequency: every 8 weeks, This is patient's 5th dose.     Progress note:  Patient identification verified by name and date of birth.  Assessment completed.  Vitals recorded in Doc Flowsheets.  Patient was provided with education regarding infusion and possible side effects.  Patient verbalized understanding.      needed: No  Premedications: declined due request/tolerance.  Infusion Rates: over 1 hours  Approximate Infusion length: 1 hour   Labs: were drawn per orders.   Vascular access: peripheral IV placed today.  Treatment Conditions:   ~~~ NOTE: If the patient answers yes to any of the questions below, hold the infusion and contact ordering provider or on-call provider.    1. Have you recently had an elevated temperature, fever, chills, productive cough, coughing for 3 weeks or longer or hemoptysis,  abnormal vital signs, night sweats,  chest pain or have you noticed a decrease in your appetite, unexplained weight loss or fatigue? No  2. Do you have any open wounds or new incisions? No  3. Do you have any recent or upcoming hospitalizations, surgeries or dental procedures? No  4. Do you currently have or recently have had any signs of illness or infection or are you on any antibiotics? No  5. Have you had any new, sudden or worsening abdominal pain? No  6. Have you or anyone in your household received a live vaccination in the past 4 weeks? Please note:  No live vaccines while on biologic/chemotherapy until 6 months after the last treatment.  Patient can receive the flu vaccine (shot only) and the pneumovax.  It is optimal for the patient to get these vaccines mid cycle, but they can be given at any time as long as it is not on the  day of the infusion. No  7. Have you recently been diagnosed with any new nervous system diseases (ie. Multiple sclerosis, Guillain [place], seizures, neurological changes) or cancer diagnosis? Are you on any form of radiation or chemotherapy? No  8. Are you pregnant or breast feeding or do you have plans of pregnancy in the future? No  9. Have you been having any signs of worsening depression or suicidal ideations?  (benlysta only) No  10. Have there been any other new onset medical symptoms? No  Patient tolerated infusion: well.    Drug Waste Record? No     Discharge Plan:   Follow up plan of care with: ongoing infusions at [facility].  Discharge instructions were reviewed with patient.  Patient/representative verbalized understanding of discharge instructions and all questions answered.  Patient discharged from [facility] in stable condition.    Dora Curran RN       /76  Pulse 95  Temp 97.5  F (36.4  C) (Oral)  Resp 16  Wt 66.8 kg (147 lb 3.2 oz)  SpO2 97%  BMI 23.05 kg/m2

## 2017-10-27 NOTE — MR AVS SNAPSHOT
After Visit Summary   10/27/2017    Emil Garcia    MRN: [id number]           Patient Information     Date Of Birth          1990        Visit Information        Provider Department      10/27/2017 12:00 PM UC 47 ATC; UC SPEC INFUSION [facility] Specialty and Procedure        Today's Diagnoses     Crohn's disease without complication, unspecified gastrointestinal tract location (H)    -  1    Crohn's disease (H)        Crohn's disease of large intestine without complication (H)           Follow-ups after your visit        Your next 10 appointments already scheduled     Dec 14, 2017  2:30 PM CST   (Arrive by 2:15 PM)   New Patient Visit with ALISHA Arias MD   [facility] D[facility]atology ([facility])    [address]  3rd Floor  [facility] [address] [phone]            Dec 22, 2017 12:00 PM CST   Infusion 120 with UC SPEC INFUSION, UC 50 ATC   [facility] Specialty and Procedure ([facility])    [address]  2nd Floor  [facility] [address] [phone]              Who to contact     If you have questions or need follow up information about today's clinic visit or your schedule please contact [facility] SPECIALTY AND PROCEDURE directly at [phone].  Normal or non-critical lab and imaging results will be communicated to you by MyChart, letter or phone within 4 business days after the clinic has received the results. If you do not hear from us within 7 days, please contact the clinic through MyChart or phone. If you have a critical or abnormal lab result, we will notify you by phone as soon as possible.  Submit refill requests through Alex [employer] Ani or call your pharmacy and they will forward the refill request to us. Please allow 3 business days for your refill to be completed.          Additional Information About Your Visit        MyChart Information      [employer] gives you secure access to your electronic health record. If you see a primary care provider, you can also send messages to your care team and make appointments. If you have questions, please call your primary care clinic.  If you do not have a primary care provider, please call [phone] and they will assist you.        Care EveryWhere ID     This is your Care EveryWhere ID. This could be used by other organizations to access your [place] medical records  [id number]        Your Vitals Were     Pulse Temperature Respirations Pulse Oximetry BMI (Body Mass Index)       83 97.5  F (36.4  C) (Oral) 16 97% 23.05 kg/m2        Blood Pressure from Last 3 Encounters:   10/27/17 115/73   09/08/17 100/63   09/01/17 111/72    Weight from Last 3 Encounters:   10/27/17 66.8 kg (147 lb 3.2 oz)   09/08/17 68 kg (150 lb)   09/01/17 66.1 kg (145 lb 12.8 oz)              We Performed the Following     CBC with platelets differential     CRP inflammation     Erythrocyte sedimentation rate auto     Hepatic panel     Hepatitis B core antibody [WNU5733]     Hepatitis B Surface Antibody [VHB5635]     Hepatitis B surface antigen [IPT198]     Hepatitis C antibody [QWD058]     Tuberculosis by Quantiferon (gold) [[id number]]        Primary Care Provider    Physician No Ref-Primary       NO REF-PRIMARY PHYSICIAN        Equal Access to Services     HAYLEE ALANIS : Hadii dax ku hadasho Soomaali, waaxda luqadaha, qaybta kaalmada adeegyada, beatris arteaga . So [facility] [phone].    ATENCIÓN: Si habla español, tiene a perkins disposición servicios gratuitos de asistencia lingüística. Llame al [phone].    We comply with applicable federal civil rights laws and [place] laws. We do not discriminate on the basis of race, color, national origin, age, disability, sex, sexual orientation, or gender identity.            Thank you!     Thank you for choosing [facility] SPECIALTY AND PROCEDURE   for your care. Our goal is always to provide you with excellent care. Hearing back from our patients is one way we can continue to improve our services. Please take a few minutes to complete the written survey that you may receive in the mail after your visit with us. Thank you!             Your Updated Medication List - Protect others around you: Learn how to safely use, store and throw away your medicines at www.disposemymeds.org.          This list is accurate as of: 10/27/17  3:05 PM.  Always use your most recent med list.                   Brand Name Dispense Instructions for use Diagnosis    BENADRYL PO      Take 50 mg by mouth 2 times daily        inFLIXimab 100 MG injection    REMICADE     100 mg IV every 8 weeks        MULTIVITAL PO      Take  by mouth.

## 2017-10-30 LAB
M TB TUBERC IFN-G BLD QL: NEGATIVE
M TB TUBERC IFN-G/MITOGEN IGNF BLD: 0 IU/ML

## 2017-12-04 ENCOUNTER — CARE COORDINATION (OUTPATIENT)
Dept: GASTROENTEROLOGY | Facility: CLINIC | Age: 27
End: 2017-12-04

## 2017-12-22 ENCOUNTER — INFUSION THERAPY VISIT (OUTPATIENT)
Dept: INFUSION THERAPY | Facility: CLINIC | Age: 27
End: 2017-12-22
Attending: INTERNAL MEDICINE
Payer: COMMERCIAL

## 2017-12-22 VITALS
BODY MASS INDEX: 22.88 KG/M2 | OXYGEN SATURATION: 100 % | DIASTOLIC BLOOD PRESSURE: 66 MMHG | WEIGHT: 146.1 LBS | HEART RATE: 83 BPM | SYSTOLIC BLOOD PRESSURE: 112 MMHG | TEMPERATURE: 96.3 F | RESPIRATION RATE: 16 BRPM

## 2017-12-22 DIAGNOSIS — K50.90 CROHN'S DISEASE WITHOUT COMPLICATION, UNSPECIFIED GASTROINTESTINAL TRACT LOCATION (H): Primary | ICD-10-CM

## 2017-12-22 DIAGNOSIS — K50.90 CROHN'S DISEASE (H): ICD-10-CM

## 2017-12-22 LAB
ALBUMIN SERPL-MCNC: 3.8 G/DL (ref 3.4–5)
ALP SERPL-CCNC: 64 U/L (ref 40–150)
ALT SERPL W P-5'-P-CCNC: 42 U/L (ref 0–70)
AST SERPL W P-5'-P-CCNC: 22 U/L (ref 0–45)
BASOPHILS # BLD AUTO: 0 10E9/L (ref 0–0.2)
BASOPHILS NFR BLD AUTO: 0.4 %
BILIRUB DIRECT SERPL-MCNC: 0.2 MG/DL (ref 0–0.2)
BILIRUB SERPL-MCNC: 1 MG/DL (ref 0.2–1.3)
CRP SERPL-MCNC: <2.9 MG/L (ref 0–8)
DIFFERENTIAL METHOD BLD: NORMAL
EOSINOPHIL # BLD AUTO: 0.2 10E9/L (ref 0–0.7)
EOSINOPHIL NFR BLD AUTO: 3 %
ERYTHROCYTE [DISTWIDTH] IN BLOOD BY AUTOMATED COUNT: 11.7 % (ref 10–15)
ERYTHROCYTE [SEDIMENTATION RATE] IN BLOOD BY WESTERGREN METHOD: 7 MM/H (ref 0–15)
HCT VFR BLD AUTO: 44.8 % (ref 40–53)
HGB BLD-MCNC: 16.1 G/DL (ref 13.3–17.7)
IMM GRANULOCYTES # BLD: 0 10E9/L (ref 0–0.4)
IMM GRANULOCYTES NFR BLD: 0.4 %
LYMPHOCYTES # BLD AUTO: 2.5 10E9/L (ref 0.8–5.3)
LYMPHOCYTES NFR BLD AUTO: 34.1 %
MCH RBC QN AUTO: 31.8 PG (ref 26.5–33)
MCHC RBC AUTO-ENTMCNC: 35.9 G/DL (ref 31.5–36.5)
MCV RBC AUTO: 89 FL (ref 78–100)
MONOCYTES # BLD AUTO: 0.5 10E9/L (ref 0–1.3)
MONOCYTES NFR BLD AUTO: 7.2 %
NEUTROPHILS # BLD AUTO: 4.1 10E9/L (ref 1.6–8.3)
NEUTROPHILS NFR BLD AUTO: 54.9 %
NRBC # BLD AUTO: 0 10*3/UL
NRBC BLD AUTO-RTO: 0 /100
PLATELET # BLD AUTO: 290 10E9/L (ref 150–450)
PROT SERPL-MCNC: 8 G/DL (ref 6.8–8.8)
RBC # BLD AUTO: 5.06 10E12/L (ref 4.4–5.9)
WBC # BLD AUTO: 7.4 10E9/L (ref 4–11)

## 2017-12-22 PROCEDURE — 96413 CHEMO IV INFUSION 1 HR: CPT

## 2017-12-22 PROCEDURE — 85652 RBC SED RATE AUTOMATED: CPT | Performed by: INTERNAL MEDICINE

## 2017-12-22 PROCEDURE — 36415 COLL VENOUS BLD VENIPUNCTURE: CPT

## 2017-12-22 PROCEDURE — 85025 COMPLETE CBC W/AUTO DIFF WBC: CPT | Performed by: INTERNAL MEDICINE

## 2017-12-22 PROCEDURE — 86140 C-REACTIVE PROTEIN: CPT | Performed by: INTERNAL MEDICINE

## 2017-12-22 PROCEDURE — 80076 HEPATIC FUNCTION PANEL: CPT | Performed by: INTERNAL MEDICINE

## 2017-12-22 PROCEDURE — 25000128 H RX IP 250 OP 636: Mod: ZF | Performed by: INTERNAL MEDICINE

## 2017-12-22 RX ORDER — DIPHENHYDRAMINE HCL 25 MG
25 CAPSULE ORAL ONCE
Status: CANCELLED
Start: 2017-12-22 | End: 2017-12-22

## 2017-12-22 RX ORDER — ACETAMINOPHEN 325 MG/1
650 TABLET ORAL ONCE
Status: CANCELLED
Start: 2017-12-22 | End: 2017-12-22

## 2017-12-22 RX ADMIN — INFLIXIMAB 300 MG: 100 INJECTION, POWDER, LYOPHILIZED, FOR SOLUTION INTRAVENOUS at 12:53

## 2017-12-22 ASSESSMENT — PAIN SCALES - GENERAL: PAINLEVEL: NO PAIN (0)

## 2017-12-22 NOTE — PATIENT INSTRUCTIONS
Dear Emil Garcia    Thank you for choosing [facility] ([facility]) for your infusion.  The following information is a summary of our appointment as well as important reminders.      Additional information: Today you had Remicade.    EDUCATION POST BIOLOGICAL/CHEMOTHERAPY INFUSION  Call the triage nurse at your clinic or seek medical attention if you have chills and/or temperature greater than or equal to 100.5, uncontrolled nausea/vomiting, diarrhea, constipation, dizziness, shortness of breath, chest pain, heart palpitations, weakness or any other new or concerning symptoms, questions or concerns.  You can not have any live virus vaccines prior to or during treatment or up to 6 months post infusion.  If you have an upcoming surgery, medical procedure or dental procedure during treatment, this should be discussed with your ordering physician and your surgeon/dentist.  If you are having any concerning symptom, if you are unsure if you should get your next infusion or wish to speak to a provider before your next infusion, please call your care coordinator or triage nurse at your clinic to notify them so we can adequately serve you.      We look forward in seeing you on your next appointment here at [facility].  Please don t hesitate to call us at [phone] to reschedule any of your appointments or to speak with one of the [facility] registered nurses.  It was a pleasure taking care of you today.    Sincerely,    [facility]  Specialty Infusion & Procedure Center  [address] [place] [address]  [address]  Phone:  [phone]

## 2017-12-22 NOTE — MR AVS SNAPSHOT
After Visit Summary   12/22/2017    Emil Garcia    MRN: [id number]           Patient Information     Date Of Birth          1990        Visit Information        Provider Department      12/22/2017 12:00 PM UC 50 ATC; UC SPEC INFUSION [facility] Specialty and Procedure        Today's Diagnoses     Crohn's disease without complication, unspecified gastrointestinal tract location (H)    -  1    Crohn's disease (H)          Care Instructions    Dear Emil Garcia    Thank you for choosing [facility] ([facility]) for your infusion.  The following information is a summary of our appointment as well as important reminders.      Additional information: Today you had Remicade.    EDUCATION POST BIOLOGICAL/CHEMOTHERAPY INFUSION  Call the triage nurse at your clinic or seek medical attention if you have chills and/or temperature greater than or equal to 100.5, uncontrolled nausea/vomiting, diarrhea, constipation, dizziness, shortness of breath, chest pain, heart palpitations, weakness or any other new or concerning symptoms, questions or concerns.  You can not have any live virus vaccines prior to or during treatment or up to 6 months post infusion.  If you have an upcoming surgery, medical procedure or dental procedure during treatment, this should be discussed with your ordering physician and your surgeon/dentist.  If you are having any concerning symptom, if you are unsure if you should get your next infusion or wish to speak to a provider before your next infusion, please call your care coordinator or triage nurse at your clinic to notify them so we can adequately serve you.      We look forward in seeing you on your next appointment here at [facility].  Please don t hesitate to call us at [phone] to reschedule any of your appointments or to speak with one of the [facility] registered nurses.  It was a pleasure taking care of  you today.    Sincerely,    [facility]  Specialty Infusion & Procedure Center  [address] [place]  [address]  Phone:  [phone]          Follow-ups after your visit        Your next 10 appointments already scheduled     Jan 17, 2018  3:45 PM CST   (Arrive by 3:30 PM)   New Patient Visit with Ariel Medrano MD   [facility] ([facility])    [address]  4th Floor  [place] [address] [phone]            Mar 08, 2018  4:15 PM CST   (Arrive by 4:00 PM)   New Patient Visit with ALISHA Arias MD   [facility] D[facility]atology ([facility])    [address]  3rd Floor  [place] [address] [phone]              Who to contact     If you have questions or need follow up information about today's clinic visit or your schedule please contact [facility] SPECIAL[facility] PROCEDURE directly at [phone].  Normal or non-critical lab and imaging results will be communicated to you by Glassy Prohart, letter or phone within 4 business days after the clinic has received the results. If you do not hear from us within 7 days, please contact the clinic through Glassy Prohart or phone. If you have a critical or abnormal lab result, we will notify you by phone as soon as possible.  Submit refill requests through [employer] or call your pharmacy and they will forward the refill request to us. Please allow 3 business days for your refill to be completed.          Additional Information About Your Visit        [employer] Information     [employer] gives you secure access to your electronic health record. If you see a primary care provider, you can also send messages to your care team and make appointments. If you have questions, please call your primary care clinic.  If you do not have a primary care provider, please call [phone] and they will assist you.        Care EveryWhere ID      This is your Care EveryWhere ID. This could be used by other organizations to access your [place] medical records  [id number]        Your Vitals Were     Pulse Temperature Respirations Pulse Oximetry BMI (Body Mass Index)       83 96.3  F (35.7  C) (Oral) 16 100% 22.88 kg/m2        Blood Pressure from Last 3 Encounters:   12/22/17 112/66   10/27/17 115/73   09/08/17 100/63    Weight from Last 3 Encounters:   12/22/17 66.3 kg (146 lb 1.6 oz)   10/27/17 66.8 kg (147 lb 3.2 oz)   09/08/17 68 kg (150 lb)              We Performed the Following     CBC with platelets differential     CRP inflammation     Erythrocyte sedimentation rate auto     Hepatic panel        Primary Care Provider Fax #    Physician No Ref-Primary [phone]       No address on file        Equal Access to Services     HAYLEE ALANIS : Shahla Pereira, cecil yuan, batsheva fried, beatris arteaga . So [facility] [phone].    ATENCIÓN: Si habla español, tiene a perkins disposición servicios gratuitos de asistencia lingüística. Llame al [phone].    We comply with applicable federal civil rights laws and [place] laws. We do not discriminate on the basis of race, color, national origin, age, disability, sex, sexual orientation, or gender identity.            Thank you!     Thank you for choosing [facility] SPECIALTY AND PROCEDURE  for your care. Our goal is always to provide you with excellent care. Hearing back from our patients is one way we can continue to improve our services. Please take a few minutes to complete the written survey that you may receive in the mail after your visit with us. Thank you!             Your Updated Medication List - Protect others around you: Learn how to safely use, store and throw away your medicines at www.disposemymeds.org.          This list is accurate as of: 12/22/17  2:02 PM.  Always use your most recent med list.                    Brand Name Dispense Instructions for use Diagnosis    BENADRYL PO      Take 50 mg by mouth 2 times daily        inFLIXimab 100 MG injection    REMICADE     100 mg IV every 8 weeks        MULTIVITAL PO      Take  by mouth.

## 2017-12-22 NOTE — PROGRESS NOTES
Nursing Note  Emil Garcia [facility] for:   Chief Complaint   Patient presents with     Remicade Infusion     During today's [facility] appointment, orders from Dr. Patten were completed.  Frequency: every 8 weeks    Progress note:  Patient identification verified by name and date of birth.  Assessment completed.  Vitals recorded in Doc Flowsheets.  Patient was provided with education regarding infusion and possible side effects.  Patient verbalized understanding.      needed: No  Premedications: historically patient has not taken pre-medications prior to infusion.  Infusion Rates: infusion given over approximately 1 hr..  Approximate Infusion length:1.5 hours.   Labs: were drawn per orders.   Vascular access: peripheral IV placed today.  Treatment Conditions: see below  Patient tolerated infusion: After infusion completed, flushed & PIV removed pt stated he was feeling sl. Lightheaded and had some SOB & throat tightness. VS stable & Sat's 100%. No rash, itching, tongue swelling. Resolved after 15 mins and pt did not want to stay longer. Suggested trying 90 min. Infusion next visit. To call MD or go to ED if symptoms recur.    ~~~ NOTE: If the patient answers yes to any of the questions below, hold the infusion and contact ordering provider or on-call provider.    1. Have you recently had an elevated temperature, fever, chills, productive cough, coughing for 3 weeks or longer or hemoptysis,  abnormal vital signs, night sweats,  chest pain or have you noticed a decrease in your appetite, unexplained weight loss or fatigue? No  2. Do you have any open wounds or new incisions? No  3. Do you have any recent or upcoming hospitalizations, surgeries or dental procedures? No  4. Do you currently have or recently have had any signs of illness or infection or are you on any antibiotics? No  5. Have you had any new, sudden or worsening  abdominal pain? No  6. Have you or anyone in your household received a live vaccination in the past 4 weeks? Please note:  No live vaccines while on biologic/chemotherapy until 6 months after the last treatment.  Patient can receive the flu vaccine (shot only) and the pneumovax.  It is optimal for the patient to get these vaccines mid cycle, but they can be given at any time as long as it is not on the day of the infusion. No  7. Have you recently been diagnosed with any new nervous system diseases (ie. Multiple sclerosis, Guillain [place], seizures, neurological changes) or cancer diagnosis? Are you on any form of radiation or chemotherapy? No  8. Are you pregnant or breast feeding or do you have plans of pregnancy in the future? No  9. Have you been having any signs of worsening depression or suicidal ideations?  (benlysta only) No  10. Have there been any other new onset medical symptoms? No    Discharge Plan:   Follow up plan of care with: ongoing infusions at [facility].  Discharge instructions were reviewed with patient.  Patient/representative verbalized understanding of discharge instructions and all questions answered.  Patient discharged from [facility] in stable condition.    ALLEGRA SIMENTAL, KYAW        BP (P) 110/64  Pulse (P) 91  Temp (P) 96.3  F (35.7  C) (Oral)  Resp (P) 16  Wt 66.3 kg (146 lb 1.6 oz)  BMI 22.88 kg/m2

## 2018-01-17 ENCOUNTER — OFFICE VISIT (OUTPATIENT)
Dept: INTERNAL MEDICINE | Facility: CLINIC | Age: 28
End: 2018-01-17
Payer: COMMERCIAL

## 2018-01-17 VITALS
DIASTOLIC BLOOD PRESSURE: 68 MMHG | HEIGHT: 64 IN | BODY MASS INDEX: 25.01 KG/M2 | OXYGEN SATURATION: 98 % | RESPIRATION RATE: 18 BRPM | TEMPERATURE: 97.6 F | SYSTOLIC BLOOD PRESSURE: 110 MMHG | WEIGHT: 146.5 LBS | HEART RATE: 89 BPM

## 2018-01-17 DIAGNOSIS — M21.42 ACQUIRED PES PLANUS OF BOTH FEET: ICD-10-CM

## 2018-01-17 DIAGNOSIS — M21.41 ACQUIRED PES PLANUS OF BOTH FEET: ICD-10-CM

## 2018-01-17 DIAGNOSIS — Z00.00 ROUTINE HEALTH MAINTENANCE: Primary | ICD-10-CM

## 2018-01-17 ASSESSMENT — PAIN SCALES - GENERAL: PAINLEVEL: NO PAIN (0)

## 2018-01-17 NOTE — PROGRESS NOTES
HPI  27-year-old presents today for immunization review and to establish primary care.  He has had a history of Crohn's colitis this is largely in remission at the present time on infliximab.  Is tolerated this well.  He is concerned about his immunization status.  Is received a single dose of Prevnar but has not had any alcohol 23 vaccine.  Is also on unvaccinated for hepatitis.  Otherwise he has been doing well has a history of flatfeet which causes him some discomfort with walking and activity is never use an orthotic and is never used a motion control shoe for this and we discussed this today gave him some examples of this on the web.  Otherwise is feeling well without specific complaints or concerns.  No history of lipid evaluation.  Past and Family hx reviewed and updated    Past Medical History:   Diagnosis Date     Crohn's colitis (H)      Past Surgical History:   Procedure Laterality Date     NO HISTORY OF SURGERY       Family History   Problem Relation Age of Onset     Macular Degeneration Paternal Grandfather      Hypertension Paternal Grandfather      Hypertension Paternal Grandmother      Glaucoma No family hx of      Social History     Social History     Marital status: Single     Spouse name: N/A     Number of children: N/A     Years of education: N/A     Occupational History     data processing      Social History Main Topics     Smoking status: Never Smoker     Smokeless tobacco: None     Alcohol use Yes      Comment: rare     Drug use: No     Sexual activity: No     Other Topics Concern     None     Social History Narrative     Answers for HPI/ROS submitted by the patient on 1/17/2018   General Symptoms: No  Skin Symptoms: No  HENT Symptoms: No  EYE SYMPTOMS: No  HEART SYMPTOMS: No  LUNG SYMPTOMS: No  INTESTINAL SYMPTOMS: No  URINARY SYMPTOMS: No  REPRODUCTIVE SYMPTOMS: No  SKELETAL SYMPTOMS: No  BLOOD SYMPTOMS: No  NERVOUS SYSTEM SYMPTOMS: No  MENTAL HEALTH SYMPTOMS: No    Exam:  /68 (BP  "Location: Right arm, Patient Position: Chair, Cuff Size: Adult Regular)  Pulse 89  Temp 97.6  F (36.4  C) (Oral)  Resp 18  Ht 1.632 m (5' 4.25\")  Wt 66.5 kg (146 lb 8 oz)  SpO2 98%  BMI 24.95 kg/m2  146 lbs 8 oz  The patient is alert, oriented with a clear sensorium.   Skin shows no lesions or rashes and good turgor.   Head is normocephalic and atraumatic.    Neck shows no nodes, thyromegaly.     Lungs are clear.   Heart shows normal S1 and S2 without murmur or gallop.    Extremities show no edema, severe bilateral pes planus with pronation.    ASSESSMENT  1 Crohn's colitis in remission  2 immunosuppression  3 pes planus with excessive pronation    Plan  1 Will have him see podiatry regarding possible orthotic we discussed motion control shoes will update his immunizations with hepatitis A B and pneumococcal 23.    This note was completed using Dragon voice recognition software.  Although reviewed after completion, some word and grammatical errors may occur.    Ariel Medrano MD  General Internal Medicine  Primary Care Center  [phone]        "

## 2018-01-17 NOTE — NURSING NOTE
Administered Twinrix, Pneumococcal 23, and HPV vaccine (see Immunizations in Chart Review). Patient tolerated well.  Ubaldo Garcia CMA at 5:27 PM on 1/17/2018

## 2018-01-17 NOTE — MR AVS SNAPSHOT
After Visit Summary   1/17/2018    Emil Garcia    MRN: [id number]           Patient Information     Date Of Birth          1990        Visit Information        Provider Department      1/17/2018 3:45 PM Ariel Medrano MD [facility]        Today's Diagnoses     Routine health maintenance    -  1    Acquired pes planus of both feet           Follow-ups after your visit        Additional Services     PODIATRY/FOOT & ANKLE SURGERY REFERRAL       Your provider has referred you to: UMP: [facility]: [address] [place] [address] Patient Scheduling Line: [phone] option 1 (scheduling and directions)    Please be aware that coverage of these services is subject to the terms and limitations of your health insurance plan.  Call member services at your health plan with any benefit or coverage questions.      Please bring the following to your appointment:  >>   Any x-rays, CTs or MRIs which have been performed.  Contact the facility where they were done to arrange for  prior to your scheduled appointment.    >>   List of current medications   >>   This referral request   >>   Any documents/labs given to you for this referral                  Your next 10 appointments already scheduled     Feb 16, 2018  1:00 PM CST   Infusion 120 with UC SPEC INFUSION, UC 42 ATC   [facility] ([facility])    [address] [place] [address]  [address]  [facility] [id number] [phone]            Mar 08, 2018  4:15 PM CST   (Arrive by 4:00 PM)   New Patient Visit with ALISHA Arias MD   [facility] ([facility])    [address] [place] [address]  3rd Floor  [facility] [id number] [phone]            Apr 13, 2018 12:00 PM CDT   Infusion 120 with UC SPEC INFUSION, UC 50 ATC   [facility] ([facility]  "[place])    [address] [facility]  [address]  [facility] [address] [phone]              Future tests that were ordered for you today     Open Future Orders        Priority Expected Expires Ordered    Lipid Profile Routine  1/17/2019 1/17/2018            Who to contact     Please call your clinic at [phone] to:    Ask questions about your health    Make or cancel appointments    Discuss your medicines    Learn about your test results    Speak to your doctor   If you have compliments or concerns about an experience at your clinic, or if you wish to file a complaint, please contact [facility] Patient Relations at [phone] or email us at [email or web address]         Additional Information About Your Visit        Sherpany Information     Sherpany gives you secure access to your electronic health record. If you see a primary care provider, you can also send messages to your care team and make appointments. If you have questions, please call your primary care clinic.  If you do not have a primary care provider, please call [phone] and they will assist you.      Sherpany is an electronic gateway that provides easy, online access to your medical records. With Sherpany, you can request a clinic appointment, read your test results, renew a prescription or communicate with your care team.     To access your existing account, please contact your [facility] or call [phone] for assistance.        Care EveryWhere ID     This is your Care EveryWhere ID. This could be used by other organizations to access your [place] medical records  [id number]        Your Vitals Were     Pulse Temperature Respirations Height Pulse Oximetry BMI (Body Mass Index)    89 97.6  F (36.4  C) (Oral) 18 1.632 m (5' 4.25\") 98% 24.95 kg/m2       Blood Pressure from Last 3 Encounters:   01/17/18 110/68   12/22/17 112/66   10/27/17 115/73    Weight from Last 3 " Encounters:   01/17/18 66.5 kg (146 lb 8 oz)   12/22/17 66.3 kg (146 lb 1.6 oz)   10/27/17 66.8 kg (147 lb 3.2 oz)              We Performed the Following     ADMIN: Vaccine, Initial (15367)     HEPATITIS A AND B VACCINE (TWINRIX), ADULT     HPV VACCINE (GARDASIL 9), 9 VALENT     Pneumococcal vaccine 23 valent PPSV23  (Pneumovax) [58748]     PODIATRY/FOOT & ANKLE SURGERY REFERRAL        Primary Care Provider Fax #    Physician No Ref-Primary [phone]       No address on file        Equal Access to Services     [facility]BREY [facility] : Hadii dax Pereira, cecil yuan, batsheva fried, beatris arteaga . So [facility] [place] [facility] [phone].    ATENCIÓN: Si habla español, tiene a perkins disposición servicios gratuitos de asistencia lingüística. Ll[facility] [phone].    We comply with applicable federal civil rights laws and [place] laws. We do not discriminate on the basis of race, color, national origin, age, disability, sex, sexual orientation, or gender identity.            Thank you!     Thank you for choosing [facility]  for your care. Our goal is always to provide you with excellent care. Hearing back from our patients is one way we can continue to improve our services. Please take a few minutes to complete the written survey that you may receive in the mail after your visit with us. Thank you!             Your Updated Medication List - Protect others around you: Learn how to safely use, store and throw away your medicines at www.disposemymeds.org.          This list is accurate as of: 1/17/18  4:36 PM.  Always use your most recent med list.                   Brand Name Dispense Instructions for use Diagnosis    BENADRYL PO      Take 50 mg by mouth as needed        inFLIXimab 100 MG injection    REMICADE     100 mg IV every 8 weeks        MULTIVITAL PO      Take  by mouth.

## 2018-01-17 NOTE — NURSING NOTE
Chief Complaint   Patient presents with     Establish Care     Patient is here to establish care for new PCP.      Imm/Inj     Also here to update immunizations needed     Ubaldo Garcia CMA (AAMA) at 3:53 PM on 1/17/2018

## 2018-02-16 ENCOUNTER — INFUSION THERAPY VISIT (OUTPATIENT)
Dept: INFUSION THERAPY | Facility: CLINIC | Age: 28
End: 2018-02-16
Attending: INTERNAL MEDICINE
Payer: COMMERCIAL

## 2018-02-16 ENCOUNTER — ALLIED HEALTH/NURSE VISIT (OUTPATIENT)
Dept: INTERNAL MEDICINE | Facility: CLINIC | Age: 28
End: 2018-02-16
Payer: COMMERCIAL

## 2018-02-16 ENCOUNTER — CARE COORDINATION (OUTPATIENT)
Dept: GASTROENTEROLOGY | Facility: CLINIC | Age: 28
End: 2018-02-16

## 2018-02-16 VITALS
OXYGEN SATURATION: 99 % | DIASTOLIC BLOOD PRESSURE: 69 MMHG | WEIGHT: 148.6 LBS | RESPIRATION RATE: 18 BRPM | SYSTOLIC BLOOD PRESSURE: 110 MMHG | BODY MASS INDEX: 25.31 KG/M2 | HEART RATE: 77 BPM

## 2018-02-16 DIAGNOSIS — Z23 NEED FOR HEPATITIS A AND B VACCINATION: Primary | ICD-10-CM

## 2018-02-16 DIAGNOSIS — Z00.00 ROUTINE HEALTH MAINTENANCE: ICD-10-CM

## 2018-02-16 DIAGNOSIS — K50.90 CROHN'S DISEASE (H): ICD-10-CM

## 2018-02-16 DIAGNOSIS — K50.90 CROHN'S DISEASE WITHOUT COMPLICATION, UNSPECIFIED GASTROINTESTINAL TRACT LOCATION (H): Primary | ICD-10-CM

## 2018-02-16 LAB
ALBUMIN SERPL-MCNC: 4.1 G/DL (ref 3.4–5)
ALP SERPL-CCNC: 68 U/L (ref 40–150)
ALT SERPL W P-5'-P-CCNC: 47 U/L (ref 0–70)
AST SERPL W P-5'-P-CCNC: 20 U/L (ref 0–45)
BASOPHILS # BLD AUTO: 0 10E9/L (ref 0–0.2)
BASOPHILS NFR BLD AUTO: 0.4 %
BILIRUB DIRECT SERPL-MCNC: 0.2 MG/DL (ref 0–0.2)
BILIRUB SERPL-MCNC: 1.2 MG/DL (ref 0.2–1.3)
CHOLEST SERPL-MCNC: 162 MG/DL
CRP SERPL-MCNC: <2.9 MG/L (ref 0–8)
DIFFERENTIAL METHOD BLD: NORMAL
EOSINOPHIL # BLD AUTO: 0.1 10E9/L (ref 0–0.7)
EOSINOPHIL NFR BLD AUTO: 1.7 %
ERYTHROCYTE [DISTWIDTH] IN BLOOD BY AUTOMATED COUNT: 11.7 % (ref 10–15)
ERYTHROCYTE [SEDIMENTATION RATE] IN BLOOD BY WESTERGREN METHOD: 9 MM/H (ref 0–15)
HCT VFR BLD AUTO: 45.7 % (ref 40–53)
HDLC SERPL-MCNC: 42 MG/DL
HGB BLD-MCNC: 16.4 G/DL (ref 13.3–17.7)
IMM GRANULOCYTES # BLD: 0 10E9/L (ref 0–0.4)
IMM GRANULOCYTES NFR BLD: 0.3 %
LDLC SERPL CALC-MCNC: 87 MG/DL
LYMPHOCYTES # BLD AUTO: 2.9 10E9/L (ref 0.8–5.3)
LYMPHOCYTES NFR BLD AUTO: 41.1 %
MCH RBC QN AUTO: 31.8 PG (ref 26.5–33)
MCHC RBC AUTO-ENTMCNC: 35.9 G/DL (ref 31.5–36.5)
MCV RBC AUTO: 89 FL (ref 78–100)
MONOCYTES # BLD AUTO: 0.5 10E9/L (ref 0–1.3)
MONOCYTES NFR BLD AUTO: 7.3 %
NEUTROPHILS # BLD AUTO: 3.5 10E9/L (ref 1.6–8.3)
NEUTROPHILS NFR BLD AUTO: 49.2 %
NONHDLC SERPL-MCNC: 120 MG/DL
NRBC # BLD AUTO: 0 10*3/UL
NRBC BLD AUTO-RTO: 0 /100
PLATELET # BLD AUTO: 291 10E9/L (ref 150–450)
PROT SERPL-MCNC: 8.3 G/DL (ref 6.8–8.8)
RBC # BLD AUTO: 5.15 10E12/L (ref 4.4–5.9)
TRIGL SERPL-MCNC: 162 MG/DL
WBC # BLD AUTO: 7 10E9/L (ref 4–11)

## 2018-02-16 PROCEDURE — 96415 CHEMO IV INFUSION ADDL HR: CPT

## 2018-02-16 PROCEDURE — 96413 CHEMO IV INFUSION 1 HR: CPT

## 2018-02-16 PROCEDURE — 25000128 H RX IP 250 OP 636: Mod: ZF | Performed by: INTERNAL MEDICINE

## 2018-02-16 RX ORDER — ACETAMINOPHEN 325 MG/1
650 TABLET ORAL ONCE
Status: CANCELLED
Start: 2018-02-16 | End: 2018-02-16

## 2018-02-16 RX ORDER — METHYLPREDNISOLONE SODIUM SUCCINATE 125 MG/2ML
125 INJECTION, POWDER, LYOPHILIZED, FOR SOLUTION INTRAMUSCULAR; INTRAVENOUS ONCE
Status: CANCELLED | OUTPATIENT
Start: 2018-02-16 | End: 2018-02-16

## 2018-02-16 RX ORDER — DIPHENHYDRAMINE HCL 25 MG
25 CAPSULE ORAL ONCE
Status: CANCELLED
Start: 2018-02-16 | End: 2018-02-16

## 2018-02-16 RX ADMIN — INFLIXIMAB 300 MG: 100 INJECTION, POWDER, LYOPHILIZED, FOR SOLUTION INTRAVENOUS at 13:46

## 2018-02-16 NOTE — PATIENT INSTRUCTIONS
Dear Emil Garcia    Thank you for choosing [facility] ([facility]) for your infusion.  The following information is a summary of our appointment as well as important reminders.      EDUCATION POST BIOLOGICAL/CHEMOTHERAPY INFUSION  Call the triage nurse at your clinic or seek medical attention if you have chills and/or temperature greater than or equal to 100.5, uncontrolled nausea/vomiting, diarrhea, constipation, dizziness, shortness of breath, chest pain, heart palpitations, weakness or any other new or concerning symptoms, questions or concerns.  You can not have any live virus vaccines prior to or during treatment or up to 6 months post infusion.  If you have an upcoming surgery, medical procedure or dental procedure during treatment, this should be discussed with your ordering physician and your surgeon/dentist.  If you are having any concerning symptom, if you are unsure if you should get your next infusion or wish to speak to a provider before your next infusion, please call your care coordinator or triage nurse at your clinic to notify them so we can adequately serve you.    Additional information: you received your infusion of Remicade 300 mg via IV today.       We look forward in seeing you on your next appointment here at [facility].  Please don t hesitate to call us at [phone] to reschedule any of your appointments or to speak with one of the [facility] registered nurses.  It was a pleasure taking care of you today.    Sincerely,  Jaquelin Rodriguez RN  [facility]  [facility]  [address] [place] [address]  [address]  Phone:  [phone]      Infliximab Solution for injection  What is this medicine?  INFLIXIMAB (in FLIX i mab) is used to treat Crohn's disease and ulcerative colitis. It is also used to treat ankylosing spondylitis, psoriasis, and some forms of arthritis.  This medicine may be  used for other purposes; ask your health care provider or pharmacist if you have questions.  What should I tell my health care provider before I take this medicine?  They need to know if you have any of these conditions:    diabetes    exposure to tuberculosis    heart failure    hepatitis or liver disease    immune system problems    infection    lung or breathing disease, like COPD    multiple sclerosis    current or past resident of [place]    seizure disorder    an unusual or allergic reaction to infliximab, mouse proteins, other medicines, foods, dyes, or preservatives    pregnant or trying to get pregnant    breast-feeding  How should I use this medicine?  This medicine is for injection into a vein. It is usually given by a health care professional in a hospital or clinic setting.  A special MedGuide will be given to you by the pharmacist with each prescription and refill. Be sure to read this information carefully each time.  Talk to your pediatrician regarding the use of this medicine in children. Special care may be needed.  Overdosage: If you think you have taken too much of this medicine contact a poison control center or emergency room at once.  NOTE: This medicine is only for you. Do not share this medicine with others.  What if I miss a dose?  It is important not to miss your dose. Call your doctor or health care professional if you are unable to keep an appointment.  What may interact with this medicine?  Do not take this medicine with any of the following medications:    anakinra    rilonacept  This medicine may also interact with the following medications:    vaccines  This list may not describe all possible interactions. Give your health care provider a list of all the medicines, herbs, non-prescription drugs, or dietary supplements you use. Also tell them if you smoke, drink alcohol, or use illegal drugs. Some items may interact with your medicine.  What should I watch  for while using this medicine?  Visit your doctor or health care professional for regular checks on your progress.  If you get a cold or other infection while receiving this medicine, call your doctor or health care professional. Do not treat yourself. This medicine may decrease your body's ability to fight infections. Before beginning therapy, your doctor may do a test to see if you have been exposed to tuberculosis.  This medicine may make the symptoms of heart failure worse in some patients. If you notice symptoms such as increased shortness of breath or swelling of the ankles or legs, contact your health care provider right away.  If you are going to have surgery or dental work, tell your health care professional or dentist that you have received this medicine.  If you take this medicine for plaque psoriasis, stay out of the sun. If you cannot avoid being in the sun, wear protective clothing and use sunscreen. Do not use sun lamps or tanning beds/booths.  What side effects may I notice from receiving this medicine?  Side effects that you should report to your doctor or health care professional as soon as possible:    allergic reactions like skin rash, itching or hives, swelling of the face, lips, or tongue    chest pain    fever or chills, usually related to the infusion    muscle or joint pain    red, scaly patches or raised bumps on the skin    signs of infection - fever or chills, cough, sore throat, pain or difficulty passing urine    swollen lymph nodes in the neck, underarm, or groin areas    unexplained weight loss    unusual bleeding or bruising    unusually weak or tired    yellowing of the eyes or skin  Side effects that usually do not require medical attention (report to your doctor or health care professional if they continue or are bothersome):    headache    heartburn or stomach pain    nausea, vomiting  This list may not describe all possible side effects. Call your doctor for medical advice about  side effects. You may report side effects to FDA at 1-941-OBW-1875.  Where should I keep my medicine?  This drug is given in a hospital or clinic and will not be stored at home.  NOTE:This sheet is a summary. It may not cover all possible information. If you have questions about this medicine, talk to your doctor, pharmacist, or health care provider. Copyright  2016 [employer]

## 2018-02-16 NOTE — MR AVS SNAPSHOT
After Visit Summary   2/16/2018    Emil Garcia    MRN: [id number]           Patient Information     Date Of Birth          1990        Visit Information        Provider Department      2/16/2018 1:00 PM UC 42 ATC; UC SPEC INFUSION [facility] Specialty [facility] Procedure        Today's Diagnoses     Crohn's disease without complication, unspecified gastrointestinal tract location (H)    -  1      Care Instructions    Dear Emil Garcia    Thank you for choosing [facility] ([facility]) for your infusion.  The following information is a summary of our appointment as well as important reminders.      EDUCATION POST BIOLOGICAL/CHEMOTHERAPY INFUSION  Call the triage nurse at your clinic or seek medical attention if you have chills and/or temperature greater than or equal to 100.5, uncontrolled nausea/vomiting, diarrhea, constipation, dizziness, shortness of breath, chest pain, heart palpitations, weakness or any other new or concerning symptoms, questions or concerns.  You can not have any live virus vaccines prior to or during treatment or up to 6 months post infusion.  If you have an upcoming surgery, medical procedure or dental procedure during treatment, this should be discussed with your ordering physician and your surgeon/dentist.  If you are having any concerning symptom, if you are unsure if you should get your next infusion or wish to speak to a provider before your next infusion, please call your care coordinator or triage nurse at your clinic to notify them so we can adequately serve you.    Additional information: you received your infusion of Remicade 300 mg via IV today.       We look forward in seeing you on your next appointment here at [facility].  Please don t hesitate to call us at [phone] to reschedule any of your appointments or to speak with one of the [facility] registered nurses.  It was a pleasure taking  care of you today.    Sincerely,  Jaquelin Rodriguez RN  [facility]  Specialty Infusion [facility] Procedure Center  [address] [place]  [address]  Phone:  [phone]      Infliximab Solution for injection  What is this medicine?  INFLIXIMAB (in FLIX i mab) is used to treat Crohn's disease and ulcerative colitis. It is also used to treat ankylosing spondylitis, psoriasis, and some forms of arthritis.  This medicine may be used for other purposes; ask your health care provider or pharmacist if you have questions.  What should I tell my health care provider before I take this medicine?  They need to know if you have any of these conditions:    diabetes    exposure to tuberculosis    heart failure    hepatitis or liver disease    immune system problems    infection    lung or breathing disease, like COPD    multiple sclerosis    current or past resident of [place]    seizure disorder    an unusual or allergic reaction to infliximab, mouse proteins, other medicines, foods, dyes, or preservatives    pregnant or trying to get pregnant    breast-feeding  How should I use this medicine?  This medicine is for injection into a vein. It is usually given by a health care professional in a hospital or clinic setting.  A special MedGuide will be given to you by the pharmacist with each prescription and refill. Be sure to read this information carefully each time.  Talk to your pediatrician regarding the use of this medicine in children. Special care may be needed.  Overdosage: If you think you have taken too much of this medicine contact a poison control center or emergency room at once.  NOTE: This medicine is only for you. Do not share this medicine with others.  What if I miss a dose?  It is important not to miss your dose. Call your doctor or health care professional if you are unable to keep an appointment.  What may interact with this medicine?  Do not take this  medicine with any of the following medications:    anakinra    rilonacept  This medicine may also interact with the following medications:    vaccines  This list may not describe all possible interactions. Give your health care provider a list of all the medicines, herbs, non-prescription drugs, or dietary supplements you use. Also tell them if you smoke, drink alcohol, or use illegal drugs. Some items may interact with your medicine.  What should I watch for while using this medicine?  Visit your doctor or health care professional for regular checks on your progress.  If you get a cold or other infection while receiving this medicine, call your doctor or health care professional. Do not treat yourself. This medicine may decrease your body's ability to fight infections. Before beginning therapy, your doctor may do a test to see if you have been exposed to tuberculosis.  This medicine may make the symptoms of heart failure worse in some patients. If you notice symptoms such as increased shortness of breath or swelling of the ankles or legs, contact your health care provider right away.  If you are going to have surgery or dental work, tell your health care professional or dentist that you have received this medicine.  If you take this medicine for plaque psoriasis, stay out of the sun. If you cannot avoid being in the sun, wear protective clothing and use sunscreen. Do not use sun lamps or tanning beds/booths.  What side effects may I notice from receiving this medicine?  Side effects that you should report to your doctor or health care professional as soon as possible:    allergic reactions like skin rash, itching or hives, swelling of the face, lips, or tongue    chest pain    fever or chills, usually related to the infusion    muscle or joint pain    red, scaly patches or raised bumps on the skin    signs of infection - fever or chills, cough, sore throat, pain or difficulty passing urine    swollen lymph nodes in  the neck, underarm, or groin areas    unexplained weight loss    unusual bleeding or bruising    unusually weak or tired    yellowing of the eyes or skin  Side effects that usually do not require medical attention (report to your doctor or health care professional if they continue or are bothersome):    headache    heartburn or stomach pain    nausea, vomiting  This list may not describe all possible side effects. Call your doctor for medical advice about side effects. You may report side effects to FDA at 9-645-DMU-5145.  Where should I keep my medicine?  This drug is given in a hospital or clinic and will not be stored at home.  NOTE:This sheet is a summary. It may not cover all possible information. If you have questions about this medicine, talk to your doctor, pharmacist, or health care provider. Copyright  2016 Gold Standard                Follow-ups after your visit        Your next 10 appointments already scheduled     Mar 08, 2018  4:15 PM CST   (Arrive by 4:00 PM)   New Patient Visit with ALISHA Arias MD   [facility] Dermatology ([facility])    [address] [place] [address]  3rd Floor  [facility] [address] [phone]            Apr 13, 2018 12:00 PM CDT   Infusion 120 with UC SPEC INFUSION, UC 50 ATC   [facility] Specialty and Procedure ([facility])    [address]  [address]  [facility] [address]   [phone]            Apr 13, 2018  2:00 PM CDT   Nurse Visit with Uc Pcc Nurse   [facility] ([facility])    [address]  4th Floor  [facility] [address]   [phone]            Jun 08, 2018 12:00 PM CDT   Infusion 120 with UC SPEC INFUSION, UC 50 ATC   [facility] Specialty and Procedure ([facility])    [address]  [address]  [facility] [address]   [phone]            Aug 03, 2018 12:00 PM CDT    Infusion 120 with REGINA SPEC INFUSION, UC 50 ATC   [facility] Specialty and Procedure ([facility])    [address]  [address]  [facility] [address] [phone]            Aug 03, 2018  2:00 PM CDT   Nurse Visit with  Pcc Nurse   [facility] Primary Care Clinic ([facility])    [address]  4th Floor  [facility] [address] [phone]              Future tests that were ordered for you today     Open Standing Orders        Priority Remaining Interval Expires Ordered    Notify Physician Routine [id number] PRN  2/16/2018            Who to contact     If you have questions or need follow up information about today's clinic visit or your schedule please contact [facility] SPECIALTY AND PROCEDURE directly at [phone].  Normal or non-critical lab and imaging results will be communicated to you by [employer] Mapping (SAM)hart, letter or phone within 4 business days after the clinic has received the results. If you do not hear from us within 7 days, please contact the clinic through Surveying And Mapping (SAM)hart or phone. If you have a critical or abnormal lab result, we will notify you by phone as soon as possible.  Submit refill requests through [employer]AM or call your pharmacy and they will forward the refill request to us. Please allow 3 business days for your refill to be completed.          Additional Information About Your Visit        Surveying And Mapping (SAM)hart Information     [employer] gives you secure access to your electronic health record. If you see a primary care provider, you can also send messages to your care team and make appointments. If you have questions, please call your primary care clinic.  If you do not have a primary care provider, please call [phone] and they will assist you.        Care EveryWhere ID     This is your Care EveryWhere ID. This could be used by other organizations to access your [facility]  records  [id number]        Your Vitals Were     Pulse Respirations Pulse Oximetry BMI (Body Mass Index)          77 18 99% 25.31 kg/m2         Blood Pressure from Last 3 Encounters:   02/16/18 110/69   01/17/18 110/68   12/22/17 112/66    Weight from Last 3 Encounters:   02/16/18 67.4 kg (148 lb 9.6 oz)   01/17/18 66.5 kg (146 lb 8 oz)   12/22/17 66.3 kg (146 lb 1.6 oz)              Today, you had the following     No orders found for display       Primary Care Provider Fax #    Physician No Ref-Primary [phone]       No address on file        Equal Access to Services     HAYLEE ALANIS : Shahla Pereira, cecil yuan, batsheva fried, beatris arteaga . So [facility] [phone].    ATENCIÓN: Si habla español, tiene a perkins disposición servicios gratuitos de asistencia lingüística. Llame al [phone].    We comply with applicable federal civil rights laws and [place] laws. We do not discriminate on the basis of race, color, national origin, age, disability, sex, sexual orientation, or gender identity.            Thank you!     Thank you for choosing [facility] SPECIALTY AND PROCEDURE  for your care. Our goal is always to provide you with excellent care. Hearing back from our patients is one way we can continue to improve our services. Please take a few minutes to complete the written survey that you may receive in the mail after your visit with us. Thank you!             Your Updated Medication List - Protect others around you: Learn how to safely use, store and throw away your medicines at www.disposemymeds.org.          This list is accurate as of 2/16/18  3:41 PM.  Always use your most recent med list.                   Brand Name Dispense Instructions for use Diagnosis    BENADRYL PO      Take 50 mg by mouth as needed        inFLIXimab 100 MG injection    REMICADE     100 mg IV every 8 weeks        MULTIVITAL PO      Take  by mouth.

## 2018-02-16 NOTE — MR AVS SNAPSHOT
After Visit Summary   2/16/2018    Emil Garcia    MRN: [id number]           Patient Information     Date Of Birth          1990        Visit Information        Provider Department      2/16/2018 3:00 PM Nurse, Gerardo Pcc [facility]        Today's Diagnoses     Need for hepatitis A and B vaccination    -  1       Follow-ups after your visit        Your next 10 appointments already scheduled     Mar 08, 2018  4:15 PM CST   (Arrive by 4:00 PM)   New Patient Visit with ALISHA Arias MD   [facility] Dermatology ([facility])    [address]  3rd Floor  [facility] [address] [phone]            Apr 13, 2018 12:00 PM CDT   Infusion 120 with UC SPEC INFUSION, UC 50 ATC   [facility] Specialty and Procedure ([facility])    [address]  [address]  [facility] [address] [phone]            Apr 13, 2018  2:00 PM CDT   Nurse Visit with Uc Pcc Nurse   [facility] ([facility])    [address] [place] [address]  4th Floor  [facility] [address] [phone]            Jun 08, 2018 12:00 PM CDT   Infusion 120 with UC SPEC INFUSION, UC 50 ATC   [facility] Specialty and Procedure ([facility])    [address]  [address]  [facility] [address] [phone]              Future tests that were ordered for you today     Open Standing Orders        Priority Remaining Interval Expires Ordered    Notify Physician Routine [id number] PRN  2/16/2018            Who to contact     Please call your clinic at [phone] to:    Ask questions about your health    Make or cancel appointments    Discuss your medicines    Learn about your test results    Speak to your doctor            Additional Information About Your Visit        MyChart Information     [employer] gives you secure access to your electronic  health record. If you see a primary care provider, you can also send messages to your care team and make appointments. If you have questions, please call your primary care clinic.  If you do not have a primary care provider, please call [phone] and they will assist you.      [facility] [employer] is an electronic gateway that provides easy, online access to your medical records. With [facility] [employer], you can request a clinic appointment, read your test results, renew a prescription or communicate with your care team.     To access your existing account, please contact your [facility] or call [phone] for assistance.        Care EveryWhere ID     This is your Care EveryWhere ID. This could be used by other organizations to access your [facility] medical records  [id number]         Blood Pressure from Last 3 Encounters:   02/16/18 112/69   01/17/18 110/68   12/22/17 112/66    Weight from Last 3 Encounters:   02/16/18 67.4 kg (148 lb 9.6 oz)   01/17/18 66.5 kg (146 lb 8 oz)   12/22/17 66.3 kg (146 lb 1.6 oz)              We Performed the Following     HEPATITIS A AND B VACCINE ([place]), ADULT        Primary Care Provider Fax #    Physician No Ref-Primary [phone]       No address on file        Equal Access to Services     HAYLEE ALANIS : Hadii dax ku hadasho Soomaali, waaxda luqadaha, qaybta kaalmada adeegyada, beatris arteaga . So [facility] [phone].    ATENCIÓN: Si habla español, tiene a perkins disposición servicios gratuitos de asistencia lingüística. Llame al [phone].    We comply with applicable federal civil rights laws and [place] laws. We do not discriminate on the basis of race, color, national origin, age, disability, sex, sexual orientation, or gender identity.            Thank you!     Thank you for choosing [facility] PRIMARY CARE [facility]  for your care. Our goal is always to provide you with excellent care. Hearing back from our patients is one way we can  continue to improve our services. Please take a few minutes to complete the written survey that you may receive in the mail after your visit with us. Thank you!             Your Updated Medication List - Protect others around you: Learn how to safely use, store and throw away your medicines at www.disposemymeds.org.          This list is accurate as of 2/16/18  3:25 PM.  Always use your most recent med list.                   Brand Name Dispense Instructions for use Diagnosis    BENADRYL PO      Take 50 mg by mouth as needed        inFLIXimab 100 MG injection    REMICADE     100 mg IV every 8 weeks        MULTIVITAL PO      Take  by mouth.

## 2018-02-16 NOTE — PROGRESS NOTES
~~~ NOTE: If the patient answers yes to any of the questions below, hold the infusion and contact ordering provider or on-call provider.    1. Have you recently had an elevated temperature, fever, chills, productive cough, coughing for 3 weeks or longer or hemoptysis,  abnormal vital signs, night sweats,  chest pain or have you noticed a decrease in your appetite, unexplained weight loss or fatigue? No  2. Do you have any open wounds or new incisions? No  3. Do you have any recent or upcoming hospitalizations, surgeries or dental procedures? No  4. Do you currently have or recently have had any signs of illness or infection or are you on any antibiotics? No  5. Have you had any new, sudden or worsening abdominal pain? No  6. Have you or anyone in your household received a live vaccination in the past 4 weeks? Please note:  No live vaccines while on biologic/chemotherapy until 6 months after the last treatment.  Patient can receive the flu vaccine (shot only) and the pneumovax.  It is optimal for the patient to get these vaccines mid cycle, but they can be given at any time as long as it is not on the day of the infusion. No  7. Have you recently been diagnosed with any new nervous system diseases (ie. Multiple sclerosis, Guillain [place], seizures, neurological changes) or cancer diagnosis? Are you on any form of radiation or chemotherapy? No  8. Are you pregnant or breast feeding or do you have plans of pregnancy in the future? No  9. Have you been having any signs of worsening depression or suicidal ideations?  (benlysta only) No  10. Have there been any other new onset medical symptoms? No    Nursing Note  Emil Garcia [facility] for: No chief complaint on file.    During today's [facility] appointment, orders from Dr. Patten were completed.  Frequency: every 8 weeks    Progress note:  Patient identification verified by name and date of  birth.  Assessment completed.  Vitals recorded in Doc Flowsheets.  Patient was provided with education regarding infusion and possible side effects.  Patient verbalized understanding.      needed: No  Premedications: historically patient has not taken pre-medications prior to infusion.  Infusion Rates: infusion given over approximately 90 minutes per pt request. Qualifies for rapid infusion over 1 hour, however, had c/o SOB with throat tightness without indication of apparent allergic reaction and  VSS during last visit. No issues reported with today's infusion.   Approximate Infusion length:2 hours.   Labs: [facility]  Vascular access: peripheral IV placed today.  Treatment Conditions: Biologic checklist performed prior to infusion; patient denies fever, chills, signs of infection, recent illness, on antibiotics, productive cough or elevated temperature.  Patient tolerated infusion: well.        Discharge Plan:   Follow up plan of care with: ongoing infusions at [facility].  Discharge instructions were reviewed with patient.  Patient/representative verbalized understanding of discharge instructions and all questions answered.  Patient discharged from [facility] in stable condition.    Jaquelin Rodriguez RN       Administrations This Visit     inFLIXimab (REMICADE) 300 mg in sodium chloride 0.9 % 305 mL infusion     Admin Date Action Dose Rate Route Administered By          02/16/2018 New Bag 300 mg 305 mL/hr Intravenous Jaquelin Rodriguez RN                           /69  Pulse 76  Resp 18  Wt 67.4 kg (148 lb 9.6 oz)  SpO2 99%  BMI 25.31 kg/m2

## 2018-03-08 ENCOUNTER — OFFICE VISIT (OUTPATIENT)
Dept: DERMATOLOGY | Facility: CLINIC | Age: 28
End: 2018-03-08
Payer: COMMERCIAL

## 2018-03-08 DIAGNOSIS — L50.3 DERMOGRAPHIA: Primary | ICD-10-CM

## 2018-03-08 DIAGNOSIS — L50.8 CHRONIC URTICARIA: ICD-10-CM

## 2018-03-08 NOTE — PROGRESS NOTES
[facility] Dermatology Note      Dermatology Problem List:  1. Dermatographism   2. Immunosuppression for Crohn's disease, no evidence of cutaneous malignancy    Encounter Date: Mar 8, 2018    CC:  Chief Complaint   Patient presents with     Skin Check     Emil is here today for a skin check.        History of Present Illness:  Mr. Emil Garcia is a 27 year old male with a history of Crohn's disease, on Infliximab, who presents as a referral from Eric Orellana PA-C for a skin check. His last Crohn's flare was 10/2014. Since then, he he has been at his baseline health without problems. He has not noticed and changes to his skin. He denies having any rashes or lesions. He denies having an oral lesions. He endorses intermittent itching on his chest. He scratches it and notices that he then gets bumps in the region he scratches. He otherwise feels well and voices no additional concerns at this time.     Past Medical History:   Patient Active Problem List   Diagnosis     Crohn's disease (H)     Past Medical History:   Diagnosis Date     Crohn's colitis (H)      Past Surgical History:   Procedure Laterality Date     NO HISTORY OF SURGERY         Social History:  The patient works as a . The patient denies use of tanning beds.    Family History:  There is no family history of skin cancer. and There is no family history of asthma, eczema or allergies.    Medications:  Current Outpatient Prescriptions   Medication Sig Dispense Refill     DiphenhydrAMINE HCl (BENADRYL PO) Take 50 mg by mouth as needed        inFLIXimab (REMICADE) 100 MG injection 100 mg IV every 8 weeks       Multiple Vitamins-Minerals (MULTIVITAL PO) Take  by mouth.       Allergies   Allergen Reactions     Nsaids      Seasonal Allergies          Review of Systems:  -Constitutional: The patient denies fatigue, fevers, chills, unintended weight loss, and night sweats.  -HEENT: Patient denies nonhealing oral sores.  -Skin:  As above in HPI. No additional skin concerns.    Physical exam:  GEN: This is a well developed, well-nourished male in no acute distress, in a pleasant mood.    SKIN: Total skin excluding the undergarment areas was performed. The exam included the head/face, neck, both arms, chest, back, abdomen, both legs, digits and/or nails.   -On the upper chest and back, there are several areas of excoriation and dermatographism.  -No other lesions of concern on areas examined.     Impression/Plan:  1. Dermatographism     Benign nature was discussed.No further intervention required at this time.     Advised him to take Allegra 180mg daily. If he continues to have itching/dermatographism, he can return in 2 months for reevaluation at which time we could try another antihistamine.   2. Immunosuppression with Infliximab for Crohn's disease     No evidence of cutaneous malignancy or cutaneous manifestations of Crohn's disease.     CC Eric Orellana PA-C on close of this encounter.  Follow-up in 2 months, earlier for new or changing lesions.     Staff Involved:  Scribed by Ariel Ribeiro, MS4 for Dr. Arias.      Emil Garcia was seen and evaluated by myself with the medical student recording the encounter acting as scribe.  I have reviewed the scribed note for completeness and accuracy and made appropriate corrections and amendments.    Lili BARAJAS

## 2018-03-08 NOTE — LETTER
3/8/2018       RE: Emil Garcia  [address] [facility] [address]     Dear Colleague,    Thank you for referring your patient, Emil Garcia, to the [facility] DERMATOLOGY at [facility]. Please see a copy of my visit note below.    [facility] Dermatology Note      Dermatology Problem List:  1. Dermatographism   2. Immunosuppression for Crohn's disease, no evidence of cutaneous malignancy    Encounter Date: Mar 8, 2018    CC:  Chief Complaint   Patient presents with     Skin Check     Emil is here today for a skin check.        History of Present Illness:  Mr. Emil Garcia is a 27 year old male with a history of Crohn's disease, on Infliximab, who presents as a referral from Eric Orellana PA-C for a skin check. His last Crohn's flare was 10/2014. Since then, he he has been at his baseline health without problems. He has not noticed and changes to his skin. He denies having any rashes or lesions. He denies having an oral lesions. He endorses intermittent itching on his chest. He scratches it and notices that he then gets bumps in the region he scratches. He otherwise feels well and voices no additional concerns at this time.     Past Medical History:   Patient Active Problem List   Diagnosis     Crohn's disease (H)     Past Medical History:   Diagnosis Date     Crohn's colitis (H)      Past Surgical History:   Procedure Laterality Date     NO HISTORY OF SURGERY         Social History:  The patient works as a . The patient denies use of tanning beds.    Family History:  There is no family history of skin cancer. and There is no family history of asthma, eczema or allergies.    Medications:  Current Outpatient Prescriptions   Medication Sig Dispense Refill     DiphenhydrAMINE HCl (BENADRYL PO) Take 50 mg by mouth as needed        inFLIXimab (REMICADE) 100 MG injection 100 mg IV every 8 weeks       Multiple Vitamins-Minerals (MULTIVITAL  PO) Take  by mouth.       Allergies   Allergen Reactions     Nsaids      Seasonal Allergies          Review of Systems:  -Constitutional: The patient denies fatigue, fevers, chills, unintended weight loss, and night sweats.  -HEENT: Patient denies nonhealing oral sores.  -Skin: As above in HPI. No additional skin concerns.    Physical exam:  GEN: This is a well developed, well-nourished male in no acute distress, in a pleasant mood.    SKIN: Total skin excluding the undergarment areas was performed. The exam included the head/face, neck, both arms, chest, back, abdomen, both legs, digits and/or nails.   -On the upper chest and back, there are several areas of excoriation and dermatographism.  -No other lesions of concern on areas examined.     Impression/Plan:  1. Dermatographism     Benign nature was discussed.No further intervention required at this time.     Advised him to take Allegra 180mg daily. If he continues to have itching/dermatographism, he can return in 2 months for reevaluation at which time we could try another antihistamine.   2. Immunosuppression with Infliximab for Crohn's disease     No evidence of cutaneous malignancy or cutaneous manifestations of Crohn's disease.     CC Eric Orellana PA-C on close of this encounter.  Follow-up in 2 months, earlier for new or changing lesions.     Staff Involved:  Scribed by Ariel Ribeiro, MS4 for Dr. Arias.      Emil Garcia was seen and evaluated by myself with the medical student recording the encounter acting as scribe.  I have reviewed the scribed note for completeness and accuracy and made appropriate corrections and amendments.    Lili BARAJAS

## 2018-03-08 NOTE — PATIENT INSTRUCTIONS
You have dermatographism. For this, you can take Allegra 180mg daily which should help control it.      We will have you return in 2 months to see if you are more comfortable, or if we need to change things up.

## 2018-03-08 NOTE — NURSING NOTE
Dermatology Rooming Note    Emil Garcia's goals for this visit include:   Chief Complaint   Patient presents with     Skin Check     Emil is here today for a skin check.      ROSLYN Mooney

## 2018-03-08 NOTE — MR AVS SNAPSHOT
After Visit Summary   3/8/2018    Emil Garcia    MRN: [id number]           Patient Information     Date Of Birth          1990        Visit Information        Provider Department      3/8/2018 4:15 PM ALISHA Arias MD [facility] Dermatology        Care Instructions    You have dermatographism. For this, you can take Allegra 180mg daily which should help control it.     We will have you return in 2 months to see if you are more comfortable, or if we need to change things up.           Follow-ups after your visit        Follow-up notes from your care team     Return in about 2 months (around 5/8/2018) for Physical Exam, Routine Visit.      Your next 10 appointments already scheduled     Apr 13, 2018 12:00 PM CDT   Infusion 120 with UC SPEC INFUSION, UC 50 ATC   [facility] Specialty and Procedure ([facility])    [address] [place] [address]  [address] [place] [address]   [phone]            Apr 13, 2018  2:00 PM CDT   Nurse Visit with Uc Pcc Nurse   [facility] Primary [facility] ([facility])    [address] [place] [address]  [address] [facility] [address]   [phone]            Jun 08, 2018 12:00 PM CDT   Infusion 120 with UC SPEC INFUSION, UC 50 ATC   [facility] Specialty and Procedure ([facility])    [address]  [address]  [facility] [address]   [phone]            Aug 03, 2018 12:00 PM CDT   Infusion 120 with UC SPEC INFUSION, UC 50 ATC   [facility] Specialty [facility] Procedure ([facility])    [address]  [address]  [facility] [address]   [phone]            Aug 03, 2018  2:00 PM CDT   Nurse Visit with Uc Pcc Nurse   [facility] ([facility])    [address] [place] [address]  [address] [facility] [address] [phone]              Who to contact      Please call your clinic at [phone] to:    Ask questions about your health    Make or cancel appointments    Discuss your medicines    Learn about your test results    Speak to your doctor            Additional Information About Your Visit        [employer] Information     [employer] gives you secure access to your electronic health record. If you see a primary care provider, you can also send messages to your care team and make appointments. If you have questions, please call your primary care clinic.  If you do not have a primary care provider, please call [phone] and they will assist you.      [employer] is an electronic gateway that provides easy, online access to your medical records. With [employer], you can request a clinic appointment, read your test results, renew a prescription or communicate with your care team.     To access your existing account, please contact your [facility] or call [phone] for assistance.        Care EveryWhere ID     This is your Care EveryWhere ID. This could be used by other organizations to access your [place] medical records  [id number]         Blood Pressure from Last 3 Encounters:   02/16/18 110/69   01/17/18 110/68   12/22/17 112/66    Weight from Last 3 Encounters:   02/16/18 67.4 kg (148 lb 9.6 oz)   01/17/18 66.5 kg (146 lb 8 oz)   12/22/17 66.3 kg (146 lb 1.6 oz)              Today, you had the following     No orders found for display       Primary Care Provider Fax #    Physician No Ref-Primary [phone]       No address on file        Equal Access to Services     HAYLEE ALANIS : Hadii dax jimenezo Sokelly, waaxda luqadaha, qaybta kaalmada donovanyachi, beatris arteaga . So [facility] [phone].    ATENCIÓN: Si habla español, tiene a perkins disposición servicios gratuitos de asistencia lingüística. Llame al [phone].    We comply with applicable federal civil rights laws and [place] laws. We do not  discriminate on the basis of race, color, national origin, age, disability, sex, sexual orientation, or gender identity.            Thank you!     Thank you for choosing [facility] DERMATOLOGY  for your care. Our goal is always to provide you with excellent care. Hearing back from our patients is one way we can continue to improve our services. Please take a few minutes to complete the written survey that you may receive in the mail after your visit with us. Thank you!             Your Updated Medication List - Protect others around you: Learn how to safely use, store and throw away your medicines at www.disposemymeds.org.          This list is accurate as of 3/8/18  4:50 PM.  Always use your most recent med list.                   Brand Name Dispense Instructions for use Diagnosis    BENADRYL PO      Take 50 mg by mouth as needed        inFLIXimab 100 MG injection    REMICADE     100 mg IV every 8 weeks        MULTIVITAL PO      Take  by mouth.

## 2018-04-13 ENCOUNTER — INFUSION THERAPY VISIT (OUTPATIENT)
Dept: INFUSION THERAPY | Facility: CLINIC | Age: 28
End: 2018-04-13
Attending: INTERNAL MEDICINE
Payer: COMMERCIAL

## 2018-04-13 ENCOUNTER — ALLIED HEALTH/NURSE VISIT (OUTPATIENT)
Dept: INTERNAL MEDICINE | Facility: CLINIC | Age: 28
End: 2018-04-13
Payer: COMMERCIAL

## 2018-04-13 VITALS
WEIGHT: 142.6 LBS | TEMPERATURE: 98.5 F | OXYGEN SATURATION: 97 % | BODY MASS INDEX: 24.29 KG/M2 | DIASTOLIC BLOOD PRESSURE: 71 MMHG | HEART RATE: 104 BPM | SYSTOLIC BLOOD PRESSURE: 114 MMHG | RESPIRATION RATE: 16 BRPM

## 2018-04-13 DIAGNOSIS — K50.90 CROHN'S DISEASE WITHOUT COMPLICATION, UNSPECIFIED GASTROINTESTINAL TRACT LOCATION (H): Primary | ICD-10-CM

## 2018-04-13 DIAGNOSIS — Z23 NEED FOR HPV VACCINATION: Primary | ICD-10-CM

## 2018-04-13 PROCEDURE — 96415 CHEMO IV INFUSION ADDL HR: CPT

## 2018-04-13 PROCEDURE — 25000128 H RX IP 250 OP 636: Mod: ZF | Performed by: INTERNAL MEDICINE

## 2018-04-13 PROCEDURE — 96413 CHEMO IV INFUSION 1 HR: CPT

## 2018-04-13 RX ORDER — METHYLPREDNISOLONE SODIUM SUCCINATE 125 MG/2ML
125 INJECTION, POWDER, LYOPHILIZED, FOR SOLUTION INTRAMUSCULAR; INTRAVENOUS ONCE
Status: CANCELLED | OUTPATIENT
Start: 2018-04-13 | End: 2018-04-13

## 2018-04-13 RX ORDER — DIPHENHYDRAMINE HCL 25 MG
25 CAPSULE ORAL ONCE
Status: CANCELLED
Start: 2018-04-13 | End: 2018-04-13

## 2018-04-13 RX ORDER — ACETAMINOPHEN 325 MG/1
650 TABLET ORAL ONCE
Status: CANCELLED
Start: 2018-04-13 | End: 2018-04-13

## 2018-04-13 RX ADMIN — INFLIXIMAB 300 MG: 100 INJECTION, POWDER, LYOPHILIZED, FOR SOLUTION INTRAVENOUS at 12:48

## 2018-04-13 NOTE — MR AVS SNAPSHOT
After Visit Summary   4/13/2018    Emil Garcia    MRN: [id number]           Patient Information     Date Of Birth          1990        Visit Information        Provider Department      4/13/2018 2:00 PM Nurse, Gerardo Pcc [facility]        Today's Diagnoses     Need for HPV vaccination    -  1       Follow-ups after your visit        Your next 10 appointments already scheduled     Jun 08, 2018 12:00 PM CDT   Infusion 120 with UC SPEC INFUSION, UC 50 ATC   [facility] Specialty [facility] Procedure ([facility])    [address]  [address]  [facility] [address] [phone]            Aug 03, 2018 12:00 PM CDT   Infusion 120 with UC SPEC INFUSION, UC 50 ATC   [facility] ([facility])    [address]  [address]  [facility] [address] [phone]            Aug 03, 2018  2:00 PM CDT   Nurse Visit with Gerardo [facility] Nurse   [facility] ([facility])    [address] [place] [address]  4th Floor  [facility] [address] [phone]              Future tests that were ordered for you today     Open Standing Orders        Priority Remaining Interval Expires Ordered    Notify Physician Routine [id number] PRN  4/13/2018            Who to contact     Please call your clinic at [phone] to:    Ask questions about your health    Make or cancel appointments    Discuss your medicines    Learn about your test results    Speak to your doctor            Additional Information About Your Visit        MyChart Information     [employer] gives you secure access to your electronic health record. If you see a primary care provider, you can also send messages to your care team and make appointments. If you have questions, please call your primary care clinic.  If you do not have a primary care provider, please call [phone] and they  will assist you.      "[employer] is an electronic gateway that provides easy, online access to your medical records. With "[employer] you can request a clinic appointment, read your test results, renew a prescription or communicate with your care team.     To access your existing account, please contact your [facility] or call [phone] for assistance.        Care EveryWhere ID     This is your Care EveryWhere ID. This could be used by other organizations to access your [place] medical records  [id number]         Blood Pressure from Last 3 Encounters:   04/13/18 114/71   02/16/18 110/69   01/17/18 110/68    Weight from Last 3 Encounters:   04/13/18 64.7 kg (142 lb 9.6 oz)   02/16/18 67.4 kg (148 lb 9.6 oz)   01/17/18 66.5 kg (146 lb 8 oz)              We Performed the Following     HPV VACCINE (GARDASIL 9), 9 VALENT        Primary Care Provider Fax #    Physician No Ref-Primary [phone]       No address on file        Equal Access to Services     HAYLEE ALANIS : Hadii dax ku hadasho Soomaali, waaxda luqadaha, qaybta kaalmada adeegyachi, beatris arteaga . So [facility] [phone].    ATENCIÓN: Si habla español, tiene a perkins disposición servicios gratuitos de asistencia lingüística. Llame al [phone].    We comply with applicable federal civil rights laws and [place] laws. We do not discriminate on the basis of race, color, national origin, age, disability, sex, sexual orientation, or gender identity.            Thank you!     Thank you for choosing [facility] PRIMARY CARE CLINIC  for your care. Our goal is always to provide you with excellent care. Hearing back from our patients is one way we can continue to improve our services. Please take a few minutes to complete the written survey that you may receive in the mail after your visit with us. Thank you!             Your Updated Medication List - Protect others around you: Learn how to safely use, store and  throw away your medicines at www.disposemymeds.org.          This list is accurate as of 4/13/18  2:49 PM.  Always use your most recent med list.                   Brand Name Dispense Instructions for use Diagnosis    BENADRYL PO      Take 50 mg by mouth as needed        inFLIXimab 100 MG injection    REMICADE     100 mg IV every 8 weeks        MULTIVITAL PO      Take  by mouth.

## 2018-04-13 NOTE — NURSING NOTE
Chief Complaint   Patient presents with     Imm/Inj     Patient here for HPV vaccine.      Triston Chester CMA at 2:45 PM on 4/13/2018.    Emil Garcia comes into clinic today at the request of Dr. Ariel Medrano Ordering Provider for HPV vaccine.    This service provided today was under the supervising provider of the day Dr. Trino Bryson, who was available if needed.    Triston Chester CMA at 2:47 PM on 4/13/2018    Patient given HPV vaccine, patient tolerated well. Triston Chester CMA at 2:47 PM on 4/13/2018

## 2018-04-13 NOTE — MR AVS SNAPSHOT
After Visit Summary   4/13/2018    Emil Garcia    MRN: [id number]           Patient Information     Date Of Birth          1990        Visit Information        Provider Department      4/13/2018 12:00 PM UC 50 ATC; UC SPEC INFUSION [facility] Specialty and Procedure        Today's Diagnoses     Crohn's disease without complication, unspecified gastrointestinal tract location (H)    -  1       Follow-ups after your visit        Your next 10 appointments already scheduled     Jun 08, 2018 12:00 PM CDT   Infusion 120 with UC SPEC INFUSION, UC 50 ATC   [facility] Specialty and Procedure ([facility])    [address]  [address]  [facility] [id number] [phone]            Aug 03, 2018 12:00 PM CDT   Infusion 120 with UC SPEC INFUSION, UC 50 ATC   [facility] Specialty and Procedure ([facility])    [address]  [address]  [facility] [address] [phone]            Aug 03, 2018  2:00 PM CDT   Nurse Visit with Uc Pcc Nurse   [facility] ([facility])    [address] [place] [address]  4th Floor  [facility] [address] [phone]              Future tests that were ordered for you today     Open Standing Orders        Priority Remaining Interval Expires Ordered    Notify Physician Routine [id number] PRN  4/13/2018            Who to contact     If you have questions or need follow up information about today's clinic visit or your schedule please contact [facility] SPECIALTY AND PROCEDURE directly at [phone].  Normal or non-critical lab and imaging results will be communicated to you by MyChart, letter or phone within 4 business days after the clinic has received the results. If you do not hear from us within 7 days, please contact the clinic through MyChart or phone. If you have a  critical or abnormal lab result, we will notify you by phone as soon as possible.  Submit refill requests through [employer] or call your pharmacy and they will forward the refill request to us. Please allow 3 business days for your refill to be completed.          Additional Information About Your Visit        [employer] Information     [employer] gives you secure access to your electronic health record. If you see a primary care provider, you can also send messages to your care team and make appointments. If you have questions, please call your primary care clinic.  If you do not have a primary care provider, please call [phone] and they will assist you.        Care EveryWhere ID     This is your Care EveryWhere ID. This could be used by other organizations to access your [place] medical records  [id number]        Your Vitals Were     Pulse Temperature Respirations Pulse Oximetry BMI (Body Mass Index)       104 98.5  F (36.9  C) (Oral) 16 97% 24.29 kg/m2        Blood Pressure from Last 3 Encounters:   04/13/18 114/71   02/16/18 110/69   01/17/18 110/68    Weight from Last 3 Encounters:   04/13/18 64.7 kg (142 lb 9.6 oz)   02/16/18 67.4 kg (148 lb 9.6 oz)   01/17/18 66.5 kg (146 lb 8 oz)              Today, you had the following     No orders found for display       Primary Care Provider Fax #    Physician No Ref-Primary [phone]       No address on file        Equal Access to Services     [facility] : Hadii aad ku hadasho Sobeatriceali, waaxda luqadaha, qaybta kaalmada adeolgayada, beatris arteaga . So [facility] [phone].    ATENCIÓN: Si habla español, tiene a perkins disposición servicios gratuitos de asistencia lingüística. Llame al [phone].    We comply with applicable federal civil rights laws and [place] laws. We do not discriminate on the basis of race, color, national origin, age, disability, sex, sexual orientation, or gender identity.            Thank you!     Thank you for  [facility]  for your care. Our goal is always to provide you with excellent care. Hearing back from our patients is one way we can continue to improve our services. Please take a few minutes to complete the written survey that you may receive in the mail after your visit with us. Thank you!             Your Updated Medication List - Protect others around you: Learn how to safely use, store and throw away your medicines at www.disposemymeds.org.          This list is accurate as of 4/13/18  5:30 PM.  Always use your most recent med list.                   Brand Name Dispense Instructions for use Diagnosis    BENADRYL PO      Take 50 mg by mouth as needed        inFLIXimab 100 MG injection    REMICADE     100 mg IV every 8 weeks        MULTIVITAL PO      Take  by mouth.

## 2018-04-20 ENCOUNTER — CARE COORDINATION (OUTPATIENT)
Dept: GASTROENTEROLOGY | Facility: CLINIC | Age: 28
End: 2018-04-20

## 2018-04-20 NOTE — PROGRESS NOTES
Called pt and offered her an appt on Tuesday on April 24 pt can not take this appt. Scheduled on May 4.   Pt will call if symptoms increase.

## 2018-04-27 ENCOUNTER — TRANSFERRED RECORDS (OUTPATIENT)
Dept: HEALTH INFORMATION MANAGEMENT | Facility: CLINIC | Age: 28
End: 2018-04-27

## 2018-04-27 ENCOUNTER — CARE COORDINATION (OUTPATIENT)
Dept: GASTROENTEROLOGY | Facility: CLINIC | Age: 28
End: 2018-04-27

## 2018-05-03 ENCOUNTER — TELEPHONE (OUTPATIENT)
Dept: GASTROENTEROLOGY | Facility: CLINIC | Age: 28
End: 2018-05-03

## 2018-05-04 ENCOUNTER — OFFICE VISIT (OUTPATIENT)
Dept: GASTROENTEROLOGY | Facility: CLINIC | Age: 28
End: 2018-05-04
Payer: COMMERCIAL

## 2018-05-04 VITALS
TEMPERATURE: 97.3 F | BODY MASS INDEX: 22.84 KG/M2 | HEIGHT: 64 IN | SYSTOLIC BLOOD PRESSURE: 111 MMHG | OXYGEN SATURATION: 99 % | DIASTOLIC BLOOD PRESSURE: 71 MMHG | HEART RATE: 78 BPM | WEIGHT: 133.8 LBS

## 2018-05-04 DIAGNOSIS — K50.10 CROHN'S DISEASE OF LARGE INTESTINE WITHOUT COMPLICATION (H): ICD-10-CM

## 2018-05-04 DIAGNOSIS — K50.10 CROHN'S DISEASE OF LARGE INTESTINE WITHOUT COMPLICATION (H): Primary | ICD-10-CM

## 2018-05-04 LAB
ALBUMIN SERPL-MCNC: 4.3 G/DL (ref 3.4–5)
ALP SERPL-CCNC: 60 U/L (ref 40–150)
ALT SERPL W P-5'-P-CCNC: 20 U/L (ref 0–70)
AST SERPL W P-5'-P-CCNC: 15 U/L (ref 0–45)
BASOPHILS # BLD AUTO: 0 10E9/L (ref 0–0.2)
BASOPHILS NFR BLD AUTO: 0.4 %
BILIRUB DIRECT SERPL-MCNC: 0.4 MG/DL (ref 0–0.2)
BILIRUB SERPL-MCNC: 2.3 MG/DL (ref 0.2–1.3)
CRP SERPL-MCNC: <2.9 MG/L (ref 0–8)
DIFFERENTIAL METHOD BLD: NORMAL
EOSINOPHIL # BLD AUTO: 0.1 10E9/L (ref 0–0.7)
EOSINOPHIL NFR BLD AUTO: 1.9 %
ERYTHROCYTE [DISTWIDTH] IN BLOOD BY AUTOMATED COUNT: 11.8 % (ref 10–15)
ERYTHROCYTE [SEDIMENTATION RATE] IN BLOOD BY WESTERGREN METHOD: 7 MM/H (ref 0–15)
HCT VFR BLD AUTO: 46.3 % (ref 40–53)
HGB BLD-MCNC: 16.4 G/DL (ref 13.3–17.7)
IMM GRANULOCYTES # BLD: 0 10E9/L (ref 0–0.4)
IMM GRANULOCYTES NFR BLD: 0.2 %
LYMPHOCYTES # BLD AUTO: 2.4 10E9/L (ref 0.8–5.3)
LYMPHOCYTES NFR BLD AUTO: 45.8 %
MCH RBC QN AUTO: 32.2 PG (ref 26.5–33)
MCHC RBC AUTO-ENTMCNC: 35.4 G/DL (ref 31.5–36.5)
MCV RBC AUTO: 91 FL (ref 78–100)
MONOCYTES # BLD AUTO: 0.4 10E9/L (ref 0–1.3)
MONOCYTES NFR BLD AUTO: 8.1 %
NEUTROPHILS # BLD AUTO: 2.3 10E9/L (ref 1.6–8.3)
NEUTROPHILS NFR BLD AUTO: 43.6 %
NRBC # BLD AUTO: 0 10*3/UL
NRBC BLD AUTO-RTO: 0 /100
PLATELET # BLD AUTO: 317 10E9/L (ref 150–450)
PROT SERPL-MCNC: 8.2 G/DL (ref 6.8–8.8)
RBC # BLD AUTO: 5.1 10E12/L (ref 4.4–5.9)
WBC # BLD AUTO: 5.2 10E9/L (ref 4–11)

## 2018-05-04 ASSESSMENT — ENCOUNTER SYMPTOMS
BLOOD IN STOOL: 0
FEVER: 0
JAUNDICE: 0
POLYPHAGIA: 0
FATIGUE: 1
DIARRHEA: 0
VOMITING: 0
HEARTBURN: 0
WEIGHT GAIN: 0
HALLUCINATIONS: 0
DECREASED APPETITE: 1
ABDOMINAL PAIN: 1
POLYDIPSIA: 0
NIGHT SWEATS: 0
BLOATING: 1
ALTERED TEMPERATURE REGULATION: 1
CONSTIPATION: 1
RECTAL PAIN: 0
NAUSEA: 1
WEIGHT LOSS: 1
INCREASED ENERGY: 0
CHILLS: 1
BOWEL INCONTINENCE: 0

## 2018-05-04 ASSESSMENT — PAIN SCALES - GENERAL: PAINLEVEL: NO PAIN (0)

## 2018-05-04 NOTE — LETTER
5/4/2018       RE: Emil Garcia  [address] [facility] [address]     Dear Colleague,    Thank you for referring your patient, Emil Garcia, to the [facility]. Please see a copy of my visit note below.    IBD CLINIC VISIT     CC/REFERRING MD:  Self referred  REASON FOR FOLLOW UP: Crohn's   COLLABORATING PHYSICIAN: Jim Patten MD    IBD HISTORY  Age at diagnosis: 23, 2014  Extent of disease: R colon/cecum  Disease phenotype: inflammation  Cathy-anal disease: None  Current CD medications: Infliximab 5mg/kg q8 weeks  Prior CD surgeries: None  Prior IBD Medications: Budesonide    DRUG MONITORING  TPMT enzyme activity: --    6-TGN/6-MMPN levels: --    Biologic concentration:  3/17/17 Inflixumab level 5.1     DISEASE ASSESSMENT  Labs:  Lab Results   Component Value Date    ALBUMIN 4.0 09/01/2017     Recent Labs   Lab Test  02/16/18   1232  12/22/17   1230   CRP  <2.9  <2.9   SED  9  7     Endoscopic assessment: Colonoscopy in 2/2016 with mild chronic active colitis in R colon/cecum and normal histology on remainder of random colon biopsies  Enterography: --  Fecal calprotectin: --   C diff: --    ASSESSMENT/PLAN  Emil is a 26-year-old male with history of colonic Crohn's disease, currently in clinical and endoscopic remission on infliximab 5 mg/kg every 8 weeks.      1.  Colonic Crohn's disease.  Patient is presenting with symptoms likely consistent with IBS with constipation rather than active inflammation.  Laboratory studies will be obtained today in addition to a fecal calprotectin.  If there is evidence of inflammation, we will proceed with a colonoscopy.  Last level obtained last year indicated proper dose (5.1, without antibodies).  We will plan to repeat this level before next infusion if dose adjustments need to be made.    -- Labs today and with every infusion (CBC, LFTs, CRP, ESR)   -- Continue infliximab every  8 weeks  -- Infliximab level to be obtained prior to next infusion  -- Pending fecal keegan, will determine need for endoscopic assessment    2.  Constipation.  Moderate stool burden evident on abdominal x-ray obtained at [facility] urgent care.  Patient does feel nauseated with the decreased appetite however is continuing to pass flatus.  At this time we will optimize his bowel regimen.  I will transition him off a stimulant laxative and optimize MiraLAX dosing.  --MiraLAX 3 times a day and titrate to stool frequency and consistency.      3. IBD healthcare maintenance based on patients current medication:  Anti-TNF medication therapy (Inflixumab)    Vaccinations:  -- Influenza (every year): Last given 2017  -- TdaP (every 10 years): Last given 2009  -- Pneumococcal Pneumonia (once then every 5 years): Last given 2015, 2018  -- Yearly assessment for latent Tb (verbal screening and exam, PPD or QuantiFERON-Tb testing): 10/2017    One time confirmation of immunity or serologies:  -- Hepatitis A (serologies or immunizations): Boosters 2018  -- Hepatitis B (serologies or immunizations): Boosters 2018  -- Varicella: Not documented  -- MMR:2004  -- HPV (all aged 18-26): Not documented  -- Meningococcal meningitis (all patients at risk for meningitis): 2007, 2012   -- Due to the immunosuppression in this patient, I would not advise administration of live vaccines such as varicella/VZV, intranasal influenza, MMR, or yellow fever vaccine (if travelling).      Bone mineral density screening   -- Recommend all patients supplement with calcium and vitamin D  -- Given prior steroid use recommend DEXA if not already done    Cancer Screening:  Colon cancer screening:  Colonoscopy every 2-3 years recommended after 8 years of disease.  Dysplasia screening is recommended 2022.    Cervical cancer screening: N/A    Skin cancer screening: Annual visual exam of skin by dermatologist since patient is immunocompromised    Depression  Screening:  -- Over the last month, have you felt down, depressed, or hopeless? sometimes  -- Over the last month, have you felt little interest or pleasure doing things? Yes because of the symptoms    Misc:  -- Avoid tobacco use  -- Avoid NSAIDs as there is potentially a 25% chance of causing an IBD flare      RTC 3 months    Thank you for this consultation.  It was a pleasure to participate in the care of this patient; please contact us with any further questions.      Eric Orellana PA-C  Division of Gastroenterology, Hepatology and Nutrition  [facility]      HPI:   Emil is a 27-year-old male with history of right colon Crohn's disease diagnosed in 2014.  Briefly, his history includes bowel issues since 2011 and was evaluated and diagnosed with C. diff colitis, treated with Flagyl and improved.  He also had a colonoscopy at that time that apparently showed acute colitis.  No changes of chronic inflammation.  Then 18 months later, he had abdominal pain and was again diagnosed with C. diff colitis.  He was prescribed vancomycin and again improved.  He had some postinfectious IBS symptoms that were treated with Bentyl.  Symptoms continued to persist.  In 2014, he had a colonoscopy that showed changes consistent with chronic active inflammatory bowel disease due to granulomas in biopsy specimen.  This was felt to represent Crohn's disease.  He was initially started on budesonide for a period of time, 1-2 weeks, but due to persistent symptoms, he was initiated on Remicade therapy at 5 mg/kg every 8 weeks.      Patient had been feeling quite good with one bowel movement per day then 1 month ago he went a week without a bowel movement and developed pain in his left side.  He developed lack of energy and low appetite.  He notes losing approximately 15 pounds over the last 3 months.  He actually went to [facility] urgent care last week, C. difficile test was checked and was negative.  Abdominal x-ray  showed a stool burden and he was encouraged to start a stool softener.  He did not start the stool softener, rather started Dulcolax on a daily basis.  Despite taking Dulcolax every day he has had 2 bowel movements over the course of this past week.  His last bowel movement was yesterday.  He continues to pass flatus.  He does have nausea.  He notes cramping and frustration with constipation.  He has urgency to go to the bathroom and occasionally nothing will come out except for gas.  Bowel movements over the last week (x2) have been very loose in consistency due to the Dulcolax on a daily basis.  He denies any blood in stool.  No joint pain or skin manifestations.  No fevers.    ROS:    No fevers or chills  No weight loss  No blurry vision, double vision or change in vision  No sore throat  No lymphadenopathy  No headache, paraesthesias, or weakness in a limb  No shortness of breath or wheezing  No chest pain or pressure  No arthralgias or myalgias  No rashes or skin changes  No odynophagia or dysphagia  No BRBPR, hematochezia, melena  No dysuria, frequency or urgency  No hot/cold intolerance or polyria  No anxiety or depression    Extra intestinal manifestations of IBD:  No uveitis/episcleritis  No aphthous ulcers   No arthritis   No erythema nodosum/pyoderma gangrenosum.     PERTINENT PAST MEDICAL HISTORY:  Past Medical History:   Diagnosis Date     Crohn's colitis (H)        PREVIOUS SURGERIES:  None    PREVIOUS ENDOSCOPY:  Colonoscopy in 2/2016 with mild chronic active colitis in R colon/cecum and normal histology on remainder of random colon biopsies    ALLERGIES:     Allergies   Allergen Reactions     Nsaids      Seasonal Allergies        PERTINENT MEDICATIONS:    Current Outpatient Prescriptions:      DiphenhydrAMINE HCl (BENADRYL PO), Take 50 mg by mouth as needed , Disp: , Rfl:      inFLIXimab (REMICADE) 100 MG injection, 100 mg IV every 8 weeks, Disp: , Rfl:      Multiple Vitamins-Minerals (MULTIVITAL  PO), Take  by mouth., Disp: , Rfl:     SOCIAL HISTORY:  Social History     Social History     Marital status: Single     Spouse name: N/A     Number of children: N/A     Years of education: N/A     Occupational History     data processing      Social History Main Topics     Smoking status: Never Smoker     Smokeless tobacco: Never Used     Alcohol use Yes      Comment: rare     Drug use: No     Sexual activity: No     Other Topics Concern     Not on file     Social History Narrative       FAMILY HISTORY:  Family History   Problem Relation Age of Onset     Macular Degeneration Paternal Grandfather      Hypertension Paternal Grandfather      Hypertension Paternal Grandmother      Glaucoma No family hx of        Past/family/social history reviewed and no changes    PHYSICAL EXAMINATION:  Constitutional: aaox3, cooperative, pleasant, not dyspneic/diaphoretic, no acute distress  Vitals reviewed: There were no vitals taken for this visit.  Wt:   Wt Readings from Last 2 Encounters:   04/13/18 64.7 kg (142 lb 9.6 oz)   02/16/18 67.4 kg (148 lb 9.6 oz)      Eyes: Sclera anicteric/injected  Ears/nose/mouth/throat: Normal oropharynx without ulcers or exudate, mucus membranes moist, hearing intact  Neck: supple, thyroid normal size  CV: No edema  Respiratory: Unlabored breathing  Lymph: No axillary, submandibular, supraclavicular or inguinal lymphadenopathy  Abd: Nondistended, +bs, no hepatosplenomegaly, nontender, no peritoneal signs  Skin: warm, perfused, no jaundice  Psych: Normal affect  MSK: Normal gait      PERTINENT STUDIES:  Most recent CBC:  Recent Labs   Lab Test  02/16/18   1232  12/22/17   1230   WBC  7.0  7.4   HGB  16.4  16.1   HCT  45.7  44.8   PLT  291  290     Most recent hepatic panel:  Recent Labs   Lab Test  02/16/18   1232  12/22/17   1230   ALT  47  42   AST  20  22     Most recent creatinine:  Recent Labs   Lab Test  04/06/11   2315   CR  0.90

## 2018-05-04 NOTE — NURSING NOTE
"Chief Complaint   Patient presents with     Crohns       Vitals:    05/04/18 0720   BP: 111/71   BP Location: Left arm   Patient Position: Chair   Cuff Size: Adult Regular   Pulse: 78   Temp: 97.3  F (36.3  C)   SpO2: 99%   Weight: 133 lb 12.8 oz   Height: 5' 4.25\"       Body mass index is 22.79 kg/(m^2).    Marsha Lucas                     "

## 2018-05-04 NOTE — PROGRESS NOTES
IBD CLINIC VISIT     CC/REFERRING MD:  Self referred  REASON FOR FOLLOW UP: Crohn's   COLLABORATING PHYSICIAN: Jim Patten MD    IBD HISTORY  Age at diagnosis: 23, 2014  Extent of disease: R colon/cecum  Disease phenotype: inflammation  Cathy-anal disease: None  Current CD medications: Infliximab 5mg/kg q8 weeks  Prior CD surgeries: None  Prior IBD Medications: Budesonide    DRUG MONITORING  TPMT enzyme activity: --    6-TGN/6-MMPN levels: --    Biologic concentration:  3/17/17 Inflixumab level 5.1     DISEASE ASSESSMENT  Labs:  Lab Results   Component Value Date    ALBUMIN 4.0 09/01/2017     Recent Labs   Lab Test  02/16/18   1232  12/22/17   1230   CRP  <2.9  <2.9   SED  9  7     Endoscopic assessment: Colonoscopy in 2/2016 with mild chronic active colitis in R colon/cecum and normal histology on remainder of random colon biopsies  Enterography: --  Fecal calprotectin: --   C diff: --    ASSESSMENT/PLAN  Emil is a 26-year-old male with history of colonic Crohn's disease, currently in clinical and endoscopic remission on infliximab 5 mg/kg every 8 weeks.      1.  Colonic Crohn's disease.  Patient is presenting with symptoms likely consistent with IBS with constipation rather than active inflammation.  Laboratory studies will be obtained today in addition to a fecal calprotectin.  If there is evidence of inflammation, we will proceed with a colonoscopy.  Last level obtained last year indicated proper dose (5.1, without antibodies).  We will plan to repeat this level before next infusion if dose adjustments need to be made.    -- Labs today and with every infusion (CBC, LFTs, CRP, ESR)   -- Continue infliximab every 8 weeks  -- Infliximab level to be obtained prior to next infusion  -- Pending fecal keegan, will determine need for endoscopic assessment    2.  Constipation.  Moderate stool burden evident on abdominal x-ray obtained at [facility] urgent care.  Patient does feel nauseated with the decreased  appetite however is continuing to pass flatus.  At this time we will optimize his bowel regimen.  I will transition him off a stimulant laxative and optimize MiraLAX dosing.  --MiraLAX 3 times a day and titrate to stool frequency and consistency.      3. IBD healthcare maintenance based on patients current medication:  Anti-TNF medication therapy (Inflixumab)    Vaccinations:  -- Influenza (every year): Last given 2017  -- TdaP (every 10 years): Last given 2009  -- Pneumococcal Pneumonia (once then every 5 years): Last given 2015, 2018  -- Yearly assessment for latent Tb (verbal screening and exam, PPD or QuantiFERON-Tb testing): 10/2017    One time confirmation of immunity or serologies:  -- Hepatitis A (serologies or immunizations): Boosters 2018  -- Hepatitis B (serologies or immunizations): Boosters 2018  -- Varicella: Not documented  -- MMR:2004  -- HPV (all aged 18-26): Not documented  -- Meningococcal meningitis (all patients at risk for meningitis): 2007, 2012   -- Due to the immunosuppression in this patient, I would not advise administration of live vaccines such as varicella/VZV, intranasal influenza, MMR, or yellow fever vaccine (if travelling).      Bone mineral density screening   -- Recommend all patients supplement with calcium and vitamin D  -- Given prior steroid use recommend DEXA if not already done    Cancer Screening:  Colon cancer screening:  Colonoscopy every 2-3 years recommended after 8 years of disease.  Dysplasia screening is recommended 2022.    Cervical cancer screening: N/A    Skin cancer screening: Annual visual exam of skin by dermatologist since patient is immunocompromised    Depression Screening:  -- Over the last month, have you felt down, depressed, or hopeless? sometimes  -- Over the last month, have you felt little interest or pleasure doing things? Yes because of the symptoms    Misc:  -- Avoid tobacco use  -- Avoid NSAIDs as there is potentially a 25% chance of causing an  IBD flare      RTC 3 months    Thank you for this consultation.  It was a pleasure to participate in the care of this patient; please contact us with any further questions.      Eric Orellana PA-C  Division of Gastroenterology, Hepatology and Nutrition  [facility]      HPI:   Emil is a 27-year-old male with history of right colon Crohn's disease diagnosed in 2014.  Briefly, his history includes bowel issues since 2011 and was evaluated and diagnosed with C. diff colitis, treated with Flagyl and improved.  He also had a colonoscopy at that time that apparently showed acute colitis.  No changes of chronic inflammation.  Then 18 months later, he had abdominal pain and was again diagnosed with C. diff colitis.  He was prescribed vancomycin and again improved.  He had some postinfectious IBS symptoms that were treated with Bentyl.  Symptoms continued to persist.  In 2014, he had a colonoscopy that showed changes consistent with chronic active inflammatory bowel disease due to granulomas in biopsy specimen.  This was felt to represent Crohn's disease.  He was initially started on budesonide for a period of time, 1-2 weeks, but due to persistent symptoms, he was initiated on Remicade therapy at 5 mg/kg every 8 weeks.      Patient had been feeling quite good with one bowel movement per day then 1 month ago he went a week without a bowel movement and developed pain in his left side.  He developed lack of energy and low appetite.  He notes losing approximately 15 pounds over the last 3 months.  He actually went to [facility] urgent care last week, C. difficile test was checked and was negative.  Abdominal x-ray showed a stool burden and he was encouraged to start a stool softener.  He did not start the stool softener, rather started Dulcolax on a daily basis.  Despite taking Dulcolax every day he has had 2 bowel movements over the course of this past week.  His last bowel movement was yesterday.  He  continues to pass flatus.  He does have nausea.  He notes cramping and frustration with constipation.  He has urgency to go to the bathroom and occasionally nothing will come out except for gas.  Bowel movements over the last week (x2) have been very loose in consistency due to the Dulcolax on a daily basis.  He denies any blood in stool.  No joint pain or skin manifestations.  No fevers.    ROS:    No fevers or chills  No weight loss  No blurry vision, double vision or change in vision  No sore throat  No lymphadenopathy  No headache, paraesthesias, or weakness in a limb  No shortness of breath or wheezing  No chest pain or pressure  No arthralgias or myalgias  No rashes or skin changes  No odynophagia or dysphagia  No BRBPR, hematochezia, melena  No dysuria, frequency or urgency  No hot/cold intolerance or polyria  No anxiety or depression    Extra intestinal manifestations of IBD:  No uveitis/episcleritis  No aphthous ulcers   No arthritis   No erythema nodosum/pyoderma gangrenosum.     PERTINENT PAST MEDICAL HISTORY:  Past Medical History:   Diagnosis Date     Crohn's colitis (H)        PREVIOUS SURGERIES:  None    PREVIOUS ENDOSCOPY:  Colonoscopy in 2/2016 with mild chronic active colitis in R colon/cecum and normal histology on remainder of random colon biopsies    ALLERGIES:     Allergies   Allergen Reactions     Nsaids      Seasonal Allergies        PERTINENT MEDICATIONS:    Current Outpatient Prescriptions:      DiphenhydrAMINE HCl (BENADRYL PO), Take 50 mg by mouth as needed , Disp: , Rfl:      inFLIXimab (REMICADE) 100 MG injection, 100 mg IV every 8 weeks, Disp: , Rfl:      Multiple Vitamins-Minerals (MULTIVITAL PO), Take  by mouth., Disp: , Rfl:     SOCIAL HISTORY:  Social History     Social History     Marital status: Single     Spouse name: N/A     Number of children: N/A     Years of education: N/A     Occupational History     data processing      Social History Main Topics     Smoking status: Never  Smoker     Smokeless tobacco: Never Used     Alcohol use Yes      Comment: rare     Drug use: No     Sexual activity: No     Other Topics Concern     Not on file     Social History Narrative       FAMILY HISTORY:  Family History   Problem Relation Age of Onset     Macular Degeneration Paternal Grandfather      Hypertension Paternal Grandfather      Hypertension Paternal Grandmother      Glaucoma No family hx of        Past/family/social history reviewed and no changes    PHYSICAL EXAMINATION:  Constitutional: aaox3, cooperative, pleasant, not dyspneic/diaphoretic, no acute distress  Vitals reviewed: There were no vitals taken for this visit.  Wt:   Wt Readings from Last 2 Encounters:   04/13/18 64.7 kg (142 lb 9.6 oz)   02/16/18 67.4 kg (148 lb 9.6 oz)      Eyes: Sclera anicteric/injected  Ears/nose/mouth/throat: Normal oropharynx without ulcers or exudate, mucus membranes moist, hearing intact  Neck: supple, thyroid normal size  CV: No edema  Respiratory: Unlabored breathing  Lymph: No axillary, submandibular, supraclavicular or inguinal lymphadenopathy  Abd: Nondistended, +bs, no hepatosplenomegaly, nontender, no peritoneal signs  Skin: warm, perfused, no jaundice  Psych: Normal affect  MSK: Normal gait      PERTINENT STUDIES:  Most recent CBC:  Recent Labs   Lab Test  02/16/18   1232  12/22/17   1230   WBC  7.0  7.4   HGB  16.4  16.1   HCT  45.7  44.8   PLT  291  290     Most recent hepatic panel:  Recent Labs   Lab Test  02/16/18   1232  12/22/17   1230   ALT  47  42   AST  20  22     Most recent creatinine:  Recent Labs   Lab Test  04/06/11   2315   CR  0.90       Answers for HPI/ROS submitted by the patient on 5/4/2018   General Symptoms: Yes  Skin Symptoms: No  HENT Symptoms: No  EYE SYMPTOMS: No  HEART SYMPTOMS: No  LUNG SYMPTOMS: No  INTESTINAL SYMPTOMS: Yes  URINARY SYMPTOMS: No  REPRODUCTIVE SYMPTOMS: No  SKELETAL SYMPTOMS: No  BLOOD SYMPTOMS: No  NERVOUS SYSTEM SYMPTOMS: No  MENTAL HEALTH SYMPTOMS:  No  Fever: No  Loss of appetite: Yes  Weight loss: Yes  Weight gain: No  Fatigue: Yes  Night sweats: No  Chills: Yes  Increased stress: Yes  Excessive hunger: No  Excessive thirst: No  Feeling hot or cold when others believe the temperature is normal: Yes  Loss of height: No  Post-operative complications: No  Surgical site pain: No  Hallucinations: No  Change in or Loss of Energy: No  Hyperactivity: No  Confusion: Yes  Heart burn or indigestion: No  Nausea: Yes  Vomiting: No  Abdominal pain: Yes  Bloating: Yes  Constipation: Yes  Diarrhea: No  Blood in stool: No  Black stools: No  Rectal or Anal pain: No  Fecal incontinence: No  Yellowing of skin or eyes: No  Vomit with blood: No  Change in stools: Yes

## 2018-05-04 NOTE — NURSING NOTE
Pt. States Having Sensation Of Food Being Stuck In Throat, Bloating, Bowel Movements  Once Per Week, Weight Loss, And Pain ULQ After Eating Meals Has Only Been On A Liquid Diet, Symptoms Past 28 Days.

## 2018-05-04 NOTE — PATIENT INSTRUCTIONS
It was a pleasure taking care of you today.  I've included a brief summary of our discussion and care plan from today's visit below.  Please review this information with your primary care provider.  ______________________________________________________________________    My recommendations are summarized as follows:  -- Continue remicade every 8 weeks  -- Labs today and with every infusion  -- Stool sample when able  -- Next endoscopic assessment: TBD  -- Patient with IBD we recommend supplementation vitamin D 1000 units daily and calcium 500 mg twice daily.  -- Vaccines/immunizations to be updated: all up to date  -- Yearly Dermatology visit for skin check while on immunosuppressive therapy. Can call [phone] to schedule.  -- No NSAIDs (ibuprofen, or anything containing ibuprofen)     -- Recommend a trial of Miralax.  This is not a stimulant laxative and is safe to take on a daily basis.  Try 3 cap(s) every day.  You can titrate this dose (increase or decrease) based on stool frequency and consistency.    Return to GI Clinic in 3 months to review your progress.    ______________________________________________________________________    Who do I call with any questions after my visit?  Please be in touch if there are any further questions that arise following today's visit.  There are multiple ways to contact your gastroenterology care team.        During business hours, you may reach a Gastroenterology nurse at [phone], option 3.       To schedule or reschedule an appointment, please call [phone].       You can always send a secure message through [employer].  [employer] messages are answered by your nurse or doctor typically within 24 hours.  Please allow extra time on weekends and holidays.        For urgent/emergent questions after business hours, you may reach the on-call GI Fellow by contacting the [facility] at [phone].     How will I get the results of any tests ordered?     You will receive all of your results.  If you have signed up for [employer], any tests ordered at your visit will be available to you after your physician reviews them.  Typically this takes 1-2 weeks.  If there are urgent results that require a change in your care plan, your physician or nurse will call you to discuss the next steps.      What is [employer]?  [employer] is a secure way for you to access all of your healthcare records from the [facility].  It is a web based computer program, so you can sign on to it from any location.  It also allows you to send secure messages to your care team.  I recommend signing up for [employer] access if you have not already done so and are comfortable with using a computer.      How to I schedule a follow-up visit?  If you did not schedule a follow-up visit today, please call [phone] to schedule a follow-up office visit.        Sincerely,    Eric Orellana PA-C  [facility]  Division of Gastroenterology

## 2018-05-04 NOTE — NURSING NOTE
Edited therapy plan and added infliximab level.  [employer] In[employer] dx forms complete, signed and scanned into the record.

## 2018-05-04 NOTE — MR AVS SNAPSHOT
After Visit Summary   5/4/2018    Emil Garcia    MRN: [id number]           Patient Information     Date Of Birth          1990        Visit Information        Provider Department      5/4/2018 7:40 AM Eric Orellana PA-C [facility]        Today's Diagnoses     Crohn's disease of large intestine without complication (H)    -  1      Care Instructions    It was a pleasure taking care of you today.  I've included a brief summary of our discussion and care plan from today's visit below.  Please review this information with your primary care provider.  ______________________________________________________________________    My recommendations are summarized as follows:  -- Continue remicade every 8 weeks  -- Labs today and with every infusion  -- Stool sample when able  -- Next endoscopic assessment: TBD  -- Patient with IBD we recommend supplementation vitamin D 1000 units daily and calcium 500 mg twice daily.  -- Vaccines/immunizations to be updated: all up to date  -- Yearly Dermatology visit for skin check while on immunosuppressive therapy. Can call [phone] to schedule.  -- No NSAIDs (ibuprofen, or anything containing ibuprofen)     -- Recommend a trial of Miralax.  This is not a stimulant laxative and is safe to take on a daily basis.  Try 3 cap(s) every day.  You can titrate this dose (increase or decrease) based on stool frequency and consistency.    Return to GI Clinic in 3 months to review your progress.    ______________________________________________________________________    Who do I call with any questions after my visit?  Please be in touch if there are any further questions that arise following today's visit.  There are multiple ways to contact your gastroenterology care team.        During business hours, you may reach a Gastroenterology nurse at [phone], option 3.       To schedule or reschedule an appointment, please call [phone].        You can always send a secure message through [employer].  [employer] messages are answered by your nurse or doctor typically within 24 hours.  Please allow extra time on weekends and holidays.        For urgent/emergent questions after business hours, you may reach the on-call GI Fellow by contacting the [facility]  at [phone].     How will I get the results of any tests ordered?    You will receive all of your results.  If you have signed up for [employer], any tests ordered at your visit will be available to you after your physician reviews them.  Typically this takes 1-2 weeks.  If there are urgent results that require a change in your care plan, your physician or nurse will call you to discuss the next steps.      What is [employer]?  [employer] is a secure way for you to access all of your healthcare records from the [facility].  It is a web based computer program, so you can sign on to it from any location.  It also allows you to send secure messages to your care team.  I recommend signing up for [employer] access if you have not already done so and are comfortable with using a computer.      How to I schedule a follow-up visit?  If you did not schedule a follow-up visit today, please call [phone] to schedule a follow-up office visit.        Sincerely,    Eric Orellana PA-C  [facility]  Division of Gastroenterology                Follow-ups after your visit        Follow-up notes from your care team     Return in about 3 months (around 8/4/2018).      Your next 10 appointments already scheduled     May 22, 2018  4:40 PM CDT   (Arrive by 4:25 PM)   NEW FOOT/ANKLE with Trevin Haney DPM   [facility] ([facility])    [address] [place] [id number] [phone]            Jun 08, 2018 12:00 PM CDT   Infusion 120 with UC SPEC INFUSION, UC 50 ATC   [facility] Specialty and  Procedure ([facility])    [address]  [address]  [facility] [address]   [phone]            Aug 03, 2018 12:00 PM CDT   Infusion 120 with UC SPEC INFUSION, UC 50 ATC   [facility] Specialty and Procedure ([facility])    [address]  [address]  [facility] [address]   [phone]            Aug 03, 2018  2:00 PM CDT   Nurse Visit with [facility] Nurse   [facility] ([facility])    [address]  4th Floor  [facility] [address] [phone]              Future tests that were ordered for you today     Open Future Orders        Priority Expected Expires Ordered    Calprotectin Feces Routine  5/4/2019 5/4/2018    Clostridium difficile toxin B PCR Routine 5/4/2018 8/2/2018 5/4/2018    CBC with platelets differential [GGD285] Routine 5/4/2018 7/3/2018 5/4/2018    Hepatic panel [LAB20] Routine 5/4/2018 7/3/2018 5/4/2018    CRP inflammation [STU5390] Routine 5/4/2018 7/3/2018 5/4/2018    Erythrocyte sedimentation rate auto [JDC363] Routine 5/4/2018 7/3/2018 5/4/2018            Who to contact     Please call your clinic at [phone] to:    Ask questions about your health    Make or cancel appointments    Discuss your medicines    Learn about your test results    Speak to your doctor            Additional Information About Your Visit        [employer] Information     [employer] gives you secure access to your electronic health record. If you see a primary care provider, you can also send messages to your care team and make appointments. If you have questions, please call your primary care clinic.  If you do not have a primary care provider, please call [phone] and they will assist you.      [employer] is an electronic gateway that provides easy, online access to your medical records. With [employer], you can request a clinic appointment, read your test results, renew a  "prescription or communicate with your care team.     To access your existing account, please contact your [facility] or call [phone] for assistance.        Care EveryWhere ID     This is your Care EveryWhere ID. This could be used by other organizations to access your [facility] medical records  [id number]        Your Vitals Were     Pulse Temperature Height Pulse Oximetry BMI (Body Mass Index)       78 97.3  F (36.3  C) 1.632 m (5' 4.25\") 99% 22.79 kg/m2        Blood Pressure from Last 3 Encounters:   05/04/18 111/71   04/13/18 114/71   02/16/18 110/69    Weight from Last 3 Encounters:   05/04/18 60.7 kg (133 lb 12.8 oz)   04/13/18 64.7 kg (142 lb 9.6 oz)   02/16/18 67.4 kg (148 lb 9.6 oz)               Primary Care Provider Fax #    Physician No Ref-Primary [phone]       No address on file        Equal Access to Services     HAYLEE ALANIS : Hadii dax ku hadasho Soomaali, waaxda luqadaha, qaybta kaalmada adeegyada, beatris arteaga . So [facility] [phone].    ATENCIÓN: Si habla español, tiene a perkins disposición servicios gratuitos de asistencia lingüística. Llame al [phone].    We comply with applicable federal civil rights laws and [place] laws. We do not discriminate on the basis of race, color, national origin, age, disability, sex, sexual orientation, or gender identity.            Thank you!     Thank you for choosing [facility]STRO[facility]  for your care. Our goal is always to provide you with excellent care. Hearing back from our patients is one way we can continue to improve our services. Please take a few minutes to complete the written survey that you may receive in the mail after your visit with us. Thank you!             Your Updated Medication List - Protect others around you: Learn how to safely use, store and throw away your medicines at www.disposemymeds.org.          This list is accurate as of 5/4/18  8:31 AM.  " Always use your most recent med list.                   Brand Name Dispense Instructions for use Diagnosis    BENADRYL PO      Take 50 mg by mouth as needed        inFLIXimab 100 MG injection    REMICADE     100 mg IV every 8 weeks        MULTIVITAL PO      Take  by mouth.

## 2018-05-05 DIAGNOSIS — K50.10 CROHN'S DISEASE OF LARGE INTESTINE WITHOUT COMPLICATION (H): ICD-10-CM

## 2018-05-05 LAB
C DIFF TOX B STL QL: NEGATIVE
SPECIMEN SOURCE: NORMAL

## 2018-05-07 ENCOUNTER — TELEPHONE (OUTPATIENT)
Dept: GASTROENTEROLOGY | Facility: CLINIC | Age: 28
End: 2018-05-07

## 2018-05-08 LAB — CALPROTECTIN STL-MCNT: 130.4 MG/KG (ref 0–49.9)

## 2018-05-08 ASSESSMENT — ENCOUNTER SYMPTOMS
WEIGHT LOSS: 1
DIARRHEA: 1
DECREASED APPETITE: 1
FATIGUE: 1
POLYDIPSIA: 0
NAUSEA: 0
VOMITING: 0
JAUNDICE: 0
HALLUCINATIONS: 0
CONSTIPATION: 0
CHILLS: 0
POLYPHAGIA: 0
WEIGHT GAIN: 0
BLOATING: 0
BLOOD IN STOOL: 0
NIGHT SWEATS: 0
HEARTBURN: 0
FEVER: 0
BOWEL INCONTINENCE: 0
ABDOMINAL PAIN: 1
ALTERED TEMPERATURE REGULATION: 1
RECTAL PAIN: 0
INCREASED ENERGY: 0

## 2018-05-10 ENCOUNTER — MYC MEDICAL ADVICE (OUTPATIENT)
Dept: GASTROENTEROLOGY | Facility: CLINIC | Age: 28
End: 2018-05-10

## 2018-05-10 ENCOUNTER — TELEPHONE (OUTPATIENT)
Dept: GASTROENTEROLOGY | Facility: CLINIC | Age: 28
End: 2018-05-10

## 2018-05-10 DIAGNOSIS — K50.118 CROHN'S DISEASE OF LARGE INTESTINE WITH OTHER COMPLICATION (H): Primary | ICD-10-CM

## 2018-05-10 NOTE — TELEPHONE ENCOUNTER
[facility] Call Center    Phone Message    May a detailed message be left on voicemail: yes    Reason for Call: Other: Dr. Patten recommended a Flex Sig this summer to pt.  Pt is wondering why Dr. Patten is waiting until this summer - why not sooner, as pt has had discomfort for a month.     Action Taken: Other: [facility]

## 2018-05-10 NOTE — TELEPHONE ENCOUNTER
Returned call to patient regarding Flex Sig.     OK to schedule sooner if patient would like. Dr. Patten mentioned summer but OK if patient prefers sooner.     Left voicemail with clinic contact information.

## 2018-05-14 ENCOUNTER — TELEPHONE (OUTPATIENT)
Dept: CALL CENTER | Age: 28
End: 2018-05-14

## 2018-05-14 NOTE — TELEPHONE ENCOUNTER
"[facility]: Nurse Triage Note  SITUATION/BACKGROUND                                                      Emil Garcia is a 27 year old male who calls with wants procedure earlier due to abdominal pain, and bloating and diarrhea.    Description:  wants procedure earlier due to abdominal pain, and bloating and diarrhea  Onset/duration:  4 weeks of \"not eating\"  Precip. factors:  Pain and bloating  Associated symptoms:  Diarrhea and constipation  Improves/worsens symptoms:  None indicated  Pain scale (0-10)   5-7/10    MEDICATIONS:   Not assessed  Allergies:   Allergies   Allergen Reactions     Nsaids      Seasonal Allergies        ASSESSMENT      Patient was very insistent that he can not wait until June 18th for his endoscopy and needs to have the issue resolved tonight. When told that a message will be sent to the clinic nurse to discuss with him in the morning - e requested to discuss with some one in the ER and get medication as well. At this point more direct and specific questions about symptoms were asked and he stated  His symptoms began to contradic each other and he eventually stated that a call tomorrow morning would be fine - any time by the end of the day would work - but the morning would be best and very a[preciated.    RECOMMENDATION/PLAN                                                      RECOMMENDED DISPOSITION:  A message will be sent to the clinic nurse.  Will comply with recommendation: Yes patient is now aggreeable to this plan.    If further questions/concerns or if symptoms do not improve, worsen or new symptoms develop, call your PCP or [phone] to talk with the Resident on call, as soon as possible.    Guideline used: abdominal Pain  Pg 11  Telephone Triage Protocols for Nurses, Fifth Edition, Julie Briggs Kathleen M. Doege, RN  "

## 2018-05-15 ENCOUNTER — TELEPHONE (OUTPATIENT)
Dept: GASTROENTEROLOGY | Facility: CLINIC | Age: 28
End: 2018-05-15

## 2018-05-15 ENCOUNTER — CARE COORDINATION (OUTPATIENT)
Dept: GASTROENTEROLOGY | Facility: CLINIC | Age: 28
End: 2018-05-15

## 2018-05-15 NOTE — TELEPHONE ENCOUNTER
[facility] Call Center    Phone Message    May a detailed message be left on voicemail: no    Reason for Call: Other: Pt sent [employer] message and is hoping that can be reviwed asap, he would like a call back at his mobile number.     Action Taken: Message routed to:  [facility] ([facility] ROSI

## 2018-05-16 NOTE — TELEPHONE ENCOUNTER
FUTURE VISIT INFORMATION      FUTURE VISIT INFORMATION:    Date: 5/22/18    Time: 1640    Location: ORTHO  REFERRAL INFORMATION:    Referring provider:  DR TALAMANTES    Referring providers clinic:  [facility]    Reason for visit/diagnosis  PES PLANUS       RECORDS STATUS      ALL RECORDS INTERNAL

## 2018-05-17 ENCOUNTER — TELEPHONE (OUTPATIENT)
Dept: GASTROENTEROLOGY | Facility: CLINIC | Age: 28
End: 2018-05-17

## 2018-05-17 NOTE — TELEPHONE ENCOUNTER
Patient scheduled for flex sig     Indication for procedure. Crohn's disease of large intestine with other complication    Referring Provider. Self     ? No     Arrival time verified? Patient instructed to arrive at 1045 am     Facility location verified? [address] [place] [address], 5th floor     Instructions given regarding prep and procedure. Prep reviewed. Transportation policy reviewed and verbalized understanding.     Prep Type Fleet enema     Are you taking any anticoagulants or blood thinners? Denies     Instructions given? Yes sent via my chart     Electronic implanted devices? Denies     Pre procedure teaching completed? Yes    Transportation from procedure? Yes     H&P / Pre op physical completed? N/A    Hector Hines RN

## 2018-05-18 DIAGNOSIS — K50.10 CROHN'S DISEASE OF LARGE INTESTINE WITHOUT COMPLICATION (H): ICD-10-CM

## 2018-05-18 LAB
ALBUMIN SERPL-MCNC: 4 G/DL (ref 3.4–5)
ALP SERPL-CCNC: 56 U/L (ref 40–150)
ALT SERPL W P-5'-P-CCNC: 30 U/L (ref 0–70)
AST SERPL W P-5'-P-CCNC: 18 U/L (ref 0–45)
BILIRUB DIRECT SERPL-MCNC: 0.3 MG/DL (ref 0–0.2)
BILIRUB SERPL-MCNC: 2.3 MG/DL (ref 0.2–1.3)
PROT SERPL-MCNC: 7.5 G/DL (ref 6.8–8.8)

## 2018-05-21 DIAGNOSIS — K50.10 CROHN'S DISEASE OF LARGE INTESTINE WITHOUT COMPLICATION (H): Primary | ICD-10-CM

## 2018-05-22 ENCOUNTER — OFFICE VISIT (OUTPATIENT)
Dept: ORTHOPEDICS | Facility: CLINIC | Age: 28
End: 2018-05-22
Payer: COMMERCIAL

## 2018-05-22 ENCOUNTER — CARE COORDINATION (OUTPATIENT)
Dept: GASTROENTEROLOGY | Facility: CLINIC | Age: 28
End: 2018-05-22

## 2018-05-22 ENCOUNTER — PRE VISIT (OUTPATIENT)
Dept: ORTHOPEDICS | Facility: CLINIC | Age: 28
End: 2018-05-22

## 2018-05-22 DIAGNOSIS — K50.90 CROHN'S DISEASE (H): Primary | ICD-10-CM

## 2018-05-22 DIAGNOSIS — M21.41 ACQUIRED PES PLANUS OF BOTH FEET: Primary | ICD-10-CM

## 2018-05-22 DIAGNOSIS — M21.42 ACQUIRED PES PLANUS OF BOTH FEET: Primary | ICD-10-CM

## 2018-05-22 NOTE — MR AVS SNAPSHOT
After Visit Summary   5/22/2018    Emil Garcia    MRN: [id number]           Patient Information     Date Of Birth          1990        Visit Information        Provider Department      5/22/2018 4:40 PM Trevin Haney DPM [facility]        Today's Diagnoses     Acquired pes planus of both feet    -  1       Follow-ups after your visit        Additional Services     ORTHOTICS REFERRAL (Foot and Ankle)       Please be aware that coverage of these services is subject to the terms and limitations of your health insurance plan.  Call member services at your health plan with any benefit or coverage questions.      Please bring the following to your appointment:    >>   Any x-rays, CTs or MRIs which have been performed.  Contact the facility where they were done to arrange for  prior to your scheduled appointment.  Any new CT, MRI or other procedures ordered by your specialist must be performed at a [facility] facility or coordinated by your clinic's referral office.    >>   List of current medications   >>   This referral request   >>   Any documents/labs given to you for this referral    ==This Referral PRINTS in the [facility] (ORTHOTICS & PROSTHETICS) Central scheduling office ==     The [facility] Central Scheduling staff will contact patient to arrange appointments. Central Scheduling Phone #:  [place], [place]  [phone]     Orthotics: Foot Orthotics    FOOT AND ANKLE ORTHOTIC PRESCRIPTION:  3/4 length with P wings                  Your next 10 appointments already scheduled     May 23, 2018  7:45 AM CDT   US ABDOMEN LIMITED with UCUS1    [facility]    [address] [address] [phone]           Please bring a list of your medicines (including vitamins, minerals and over-the-counter drugs). Also, tell your doctor about any allergies you may have. Wear  comfortable clothes and leave your valuables at home.  Adults: No eating or drinking for 8 hours before the exam. You may take medicine with a small sip of water.  Children: - Children 6+ years: No food or drink for 6 hours before exam. - Children 1-5 years: No food or drink for 4 hours before exam. - Infants, breast-fed: may have breast milk up to 2 hours before exam. - Infants, formula: may have bottle until 4 hours before exam.  Please call the Imaging Department at your exam site with any questions.            May 23, 2018   Procedure with Jim Patten MD   [facility] ([facility])    [address] [place] [address]  5th Floor  [facility] [id number] [phone]           Located in the Clinics and Surgery Center at [address] [place] [address].   parking is very convenient and highly recommended.  is a $6 flat rate fee.  Both  and self parkers should enter the main arrival plaza from [facility]; parking attendants will direct you based on your parking preference.            Jun 08, 2018 12:00 PM CDT   Infusion 120 with UC SPEC INFUSION, UC 50 ATC   [facility] Specialty and Procedure ([facility])    [address]  [address]  [facility] [id number] [phone]            Aug 03, 2018 12:00 PM CDT   Infusion 120 with UC SPEC INFUSION, UC 50 ATC   [facility] Specialty and Procedure ([facility])    [address]  [address]  [facility] [address]   [phone]            Aug 03, 2018  2:00 PM CDT   Nurse Visit with Uc Pcc Nurse   [facility] ([facility])    [address] [place] [address]  4th Floor  [facility] [id number] [phone]              Future tests that were ordered for you today     Open Future Orders        Priority Expected Expires Ordered    US Abdomen Limited  Routine  5/21/2019 5/21/2018            Who to contact     Please call your clinic at [phone] to:    Ask questions about your health    Make or cancel appointments    Discuss your medicines    Learn about your test results    Speak to your doctor            Additional Information About Your Visit        [employer] Information     [employer] gives you secure access to your electronic health record. If you see a primary care provider, you can also send messages to your care team and make appointments. If you have questions, please call your primary care clinic.  If you do not have a primary care provider, please call [phone] and they will assist you.      [employer] is an electronic gateway that provides easy, online access to your medical records. With [employer], you can request a clinic appointment, read your test results, renew a prescription or communicate with your care team.     To access your existing account, please contact your [facility] or call [phone] for assistance.        Care EveryWhere ID     This is your Care EveryWhere ID. This could be used by other organizations to access your [place] medical records  [id number]         Blood Pressure from Last 3 Encounters:   05/04/18 111/71   04/13/18 114/71   02/16/18 110/69    Weight from Last 3 Encounters:   05/04/18 60.7 kg (133 lb 12.8 oz)   04/13/18 64.7 kg (142 lb 9.6 oz)   02/16/18 67.4 kg (148 lb 9.6 oz)              We Performed the Following     ORTHOTICS REFERRAL (Foot and Ankle)          Today's Medication Changes          These changes are accurate as of 5/22/18  5:42 PM.  If you have any questions, ask your nurse or doctor.               Start taking these medicines.        Dose/Directions    polyethylene glycol 236 g suspension   Commonly known as:  GOLYTELY   Used for:  Crohn's disease (H)   Started by:  Vivi Kate, RN        Dose:  4 L   Take 4,000 mLs (4 L) by mouth once for 1 dose   Quantity:  4000 mL    Refills:  0            Where to get your medicines      These medications [id number] - [place], [place] - [address]  [address], [facility] [address]     Phone:  [phone]     polyethylene glycol 236 g suspension                Primary Care Provider Fax #    Physician No Ref-Primary [phone]       No address on file        Equal Access to Services     [facility]: Hadii aad ku hadasho Soomaali, waaxda luqadaha, qaybta kaalmada adeegyada, waxay idiin hayaan adeeg kharash lajordyn . So [facility] [phone].    ATENCIÓN: Si habla español, tiene a perkins disposición servicios gratuitos de asistencia lingüística. Richard al [phone].    We comply with applicable federal civil rights laws and [place] laws. We do not discriminate on the basis of race, color, national origin, age, disability, sex, sexual orientation, or gender identity.            Thank you!     Thank you for choosing [facility]THOP[facility]  for your care. Our goal is always to provide you with excellent care. Hearing back from our patients is one way we can continue to improve our services. Please take a few minutes to complete the written survey that you may receive in the mail after your visit with us. Thank you!             Your Updated Medication List - Protect others around you: Learn how to safely use, store and throw away your medicines at www.disposemymeds.org.          This list is accurate as of 5/22/18  5:42 PM.  Always use your most recent med list.                   Brand Name Dispense Instructions for use Diagnosis    BENADRYL PO      Take 50 mg by mouth as needed        inFLIXimab 100 MG injection    REMICADE     100 mg IV every 8 weeks        MULTIVITAL PO      Take  by mouth.        polyethylene glycol 236 g suspension    GOLYTELY    4000 mL    Take 4,000 mLs (4 L) by mouth once for 1 dose    Crohn's disease (H)

## 2018-05-22 NOTE — NURSING NOTE
Reason For Visit:   Chief Complaint   Patient presents with     Consult     Flat feet. Pt stated that he has been trying to stay off of his feet. Pt state dthat if he walks too much his feet start to pronate and his knees get sore. Referring:  JUDSON TALAMANTES       Pain Assessment  Patient Currently in Pain: Denies               Current Outpatient Prescriptions   Medication Sig Dispense Refill     DiphenhydrAMINE HCl (BENADRYL PO) Take 50 mg by mouth as needed        inFLIXimab (REMICADE) 100 MG injection 100 mg IV every 8 weeks       Multiple Vitamins-Minerals (MULTIVITAL PO) Take  by mouth.       polyethylene glycol (GOLYTELY) 236 g suspension Take 4,000 mLs (4 L) by mouth once for 1 dose 4000 mL 0          Allergies   Allergen Reactions     Nsaids      Seasonal Allergies

## 2018-05-22 NOTE — PROGRESS NOTES
Called pt as pt was to have a colonoscopy and not a flex sig.  Unable  to reach pt.  Left a message that pt can do whatever prep he wants.  Start clear liquid as as soon as he receives this message. Sent my chart message and instructions via his email.

## 2018-05-22 NOTE — PROGRESS NOTES
Date of Service: 5/22/2018    Chief Complaint:   Chief Complaint   Patient presents with     Consult     Flat feet. Pt stated that he has been trying to stay off of his feet. Pt state dthat if he walks too much his feet start to pronate and his knees get sore. Referring:  JUDSON TALAMANTES        HPI: Emil is a 27 year old male who presents today for further evaluation of bilateral flat feet.  He relates he was recently seen by Dr. Talamantes in primary care clinic.  He was noted to have bilateral knee pain.  He relates that he has flat feet for the majority of his life.  He relates that he can walk about 5-10 miles, and then he starts get pain in his feet and in his knees.  He wears a pair of walking shoes usually.  He would like a pair of orthotics today.    Review of Systems: Answers for HPI/ROS submitted by the patient on 5/8/2018   General Symptoms: Yes  Skin Symptoms: No  HENT Symptoms: No  EYE SYMPTOMS: No  HEART SYMPTOMS: No  LUNG SYMPTOMS: No  INTESTINAL SYMPTOMS: Yes  URINARY SYMPTOMS: No  REPRODUCTIVE SYMPTOMS: No  SKELETAL SYMPTOMS: No  BLOOD SYMPTOMS: No  NERVOUS SYSTEM SYMPTOMS: No  MENTAL HEALTH SYMPTOMS: No  Fever: No  Loss of appetite: Yes  Weight loss: Yes  Weight gain: No  Fatigue: Yes  Night sweats: No  Chills: No  Increased stress: Yes  Excessive hunger: No  Excessive thirst: No  Feeling hot or cold when others believe the temperature is normal: Yes  Loss of height: No  Post-operative complications: No  Surgical site pain: No  Hallucinations: No  Change in or Loss of Energy: No  Hyperactivity: No  Confusion: No  Heart burn or indigestion: No  Nausea: No  Vomiting: No  Abdominal pain: Yes  Bloating: No  Constipation: No  Diarrhea: Yes  Blood in stool: No  Black stools: No  Rectal or Anal pain: No  Fecal incontinence: No  Yellowing of skin or eyes: No  Vomit with blood: No  Change in stools: No      PMH:   Past Medical History:   Diagnosis Date     Cancer (H) 05/05/2018    WILL NOT ALLOW  US TO COMPLETE THE  QUESTTIONAIR WITHOUT MARKING YES TO THE DIAGNOSIS TO CANCER     Crohn's colitis (H)        PSxH:   Past Surgical History:   Procedure Laterality Date     NO HISTORY OF SURGERY         Allergies: Nsaids and Seasonal allergies    SH:   Social History     Social History     Marital status: Single     Spouse name: N/A     Number of children: N/A     Years of education: N/A     Occupational History     data processing      Social History Main Topics     Smoking status: Never Smoker     Smokeless tobacco: Never Used     Alcohol use Yes      Comment: rare     Drug use: No     Sexual activity: No     Other Topics Concern     Not on file     Social History Narrative       FH:   Family History   Problem Relation Age of Onset     Macular Degeneration Paternal Grandfather      Hypertension Paternal Grandfather      Hypertension Paternal Grandmother      Glaucoma No family hx of        Objective:  Data Unavailable Data Unavailable Data Unavailable Data Unavailable Data Unavailable 0 lbs 0 oz    PT and DP pulses are 2/4 bilaterally. CRT is 3 seconds. Positive pedal hair.   Gross sensation is intact bilaterally.   Equinus is moderate bilaterally. No pain with active or passive ROM of the ankle, MTJ, 1st ray, or halluces bilaterally,.  Pes planus is noted bilaterally with medial collapse at the talonavicular joints bilaterally.  Abduction of the forefoot noted with ambulation.  Nails normal bilaterally. No open lesions are noted.     Assessment: Severe pes planus with medial talar collapse.  This is causing foot and knee pain.    Plan:  - Pt seen and evaluated.  -I agree with recommendation for orthotics.  These were molded and sent to the lab for him today.  They will be three fourths length and will have P wings on the medial aspects of both.  See again in clinic as needed.

## 2018-05-22 NOTE — LETTER
5/22/2018       RE: Emil Garcia  [address] [facility] [id number]     Dear Colleague,    Thank you for referring your patient, Emil Garcia, to the [facility] at [facility]. Please see a copy of my visit note below.    Date of Service: 5/22/2018    Chief Complaint:   Chief Complaint   Patient presents with     Consult     Flat feet. Pt stated that he has been trying to stay off of his feet. Pt state dthat if he walks too much his feet start to pronate and his knees get sore. Referring:  JUDSON TALAMANTES        HPI: Emil is a 27 year old male who presents today for further evaluation of bilateral flat feet.  He relates he was recently seen by Dr. Talamantes in primary care clinic.  He was noted to have bilateral knee pain.  He relates that he has flat feet for the majority of his life.  He relates that he can walk about 5-10 miles, and then he starts get pain in his feet and in his knees.  He wears a pair of walking shoes usually.  He would like a pair of orthotics today.    PMH:   Past Medical History:   Diagnosis Date     Cancer (H) 05/05/2018    WILL NOT ALLOW US TO COMPLETE THE  QUESTTIONAIR WITHOUT MARKING YES TO THE DIAGNOSIS TO CANCER     Crohn's colitis (H)        PSxH:   Past Surgical History:   Procedure Laterality Date     NO HISTORY OF SURGERY         Allergies: Nsaids and Seasonal allergies    SH:   Social History     Social History     Marital status: Single     Spouse name: N/A     Number of children: N/A     Years of education: N/A     Occupational History     data processing      Social History Main Topics     Smoking status: Never Smoker     Smokeless tobacco: Never Used     Alcohol use Yes      Comment: rare     Drug use: No     Sexual activity: No     Other Topics Concern     Not on file     Social History Narrative       FH:   Family History   Problem Relation Age of Onset     Macular Degeneration Paternal Grandfather       Hypertension Paternal Grandfather      Hypertension Paternal Grandmother      Glaucoma No family hx of        Objective:  Data Unavailable Data Unavailable Data Unavailable Data Unavailable Data Unavailable 0 lbs 0 oz    PT and DP pulses are 2/4 bilaterally. CRT is 3 seconds. Positive pedal hair.   Gross sensation is intact bilaterally.   Equinus is moderate bilaterally. No pain with active or passive ROM of the ankle, MTJ, 1st ray, or halluces bilaterally,.  Pes planus is noted bilaterally with medial collapse at the talonavicular joints bilaterally.  Abduction of the forefoot noted with ambulation.  Nails normal bilaterally. No open lesions are noted.     Assessment: Severe pes planus with medial talar collapse.  This is causing foot and knee pain.    Plan:  - Pt seen and evaluated.  -I agree with recommendation for orthotics.  These were molded and sent to the lab for him today.  They will be three fourths length and will have P wings on the medial aspects of both.  See again in clinic as needed.       Again, thank you for allowing me to participate in the care of your patient.      Sincerely,    Trevin Haney DPM

## 2018-05-23 ENCOUNTER — HOSPITAL ENCOUNTER (OUTPATIENT)
Facility: AMBULATORY SURGERY CENTER | Age: 28
End: 2018-05-23
Attending: INTERNAL MEDICINE
Payer: COMMERCIAL

## 2018-05-23 ENCOUNTER — SURGERY (OUTPATIENT)
Age: 28
End: 2018-05-23

## 2018-05-23 ENCOUNTER — RADIANT APPOINTMENT (OUTPATIENT)
Dept: ULTRASOUND IMAGING | Facility: CLINIC | Age: 28
End: 2018-05-23
Attending: PHYSICIAN ASSISTANT
Payer: COMMERCIAL

## 2018-05-23 VITALS
SYSTOLIC BLOOD PRESSURE: 101 MMHG | RESPIRATION RATE: 16 BRPM | DIASTOLIC BLOOD PRESSURE: 68 MMHG | OXYGEN SATURATION: 100 % | TEMPERATURE: 97 F

## 2018-05-23 DIAGNOSIS — K50.10 CROHN'S DISEASE OF LARGE INTESTINE WITHOUT COMPLICATION (H): ICD-10-CM

## 2018-05-23 DIAGNOSIS — K50.10 CROHN'S DISEASE OF LARGE INTESTINE WITHOUT COMPLICATION (H): Primary | ICD-10-CM

## 2018-05-23 LAB — COLONOSCOPY: NORMAL

## 2018-05-23 RX ORDER — NALOXONE HYDROCHLORIDE 0.4 MG/ML
.1-.4 INJECTION, SOLUTION INTRAMUSCULAR; INTRAVENOUS; SUBCUTANEOUS
Status: DISCONTINUED | OUTPATIENT
Start: 2018-05-23 | End: 2018-05-24 | Stop reason: HOSPADM

## 2018-05-23 RX ORDER — LIDOCAINE 40 MG/G
CREAM TOPICAL
Status: DISCONTINUED | OUTPATIENT
Start: 2018-05-23 | End: 2018-05-23 | Stop reason: HOSPADM

## 2018-05-23 RX ORDER — ONDANSETRON 2 MG/ML
4 INJECTION INTRAMUSCULAR; INTRAVENOUS
Status: COMPLETED | OUTPATIENT
Start: 2018-05-23 | End: 2018-05-23

## 2018-05-23 RX ORDER — BUDESONIDE 3 MG/1
9 CAPSULE, COATED PELLETS ORAL EVERY MORNING
Qty: 270 CAPSULE | Refills: 1 | Status: SHIPPED | OUTPATIENT
Start: 2018-05-23 | End: 2018-10-23

## 2018-05-23 RX ORDER — FENTANYL CITRATE 50 UG/ML
INJECTION, SOLUTION INTRAMUSCULAR; INTRAVENOUS PRN
Status: DISCONTINUED | OUTPATIENT
Start: 2018-05-23 | End: 2018-05-23 | Stop reason: HOSPADM

## 2018-05-23 RX ORDER — ONDANSETRON 2 MG/ML
4 INJECTION INTRAMUSCULAR; INTRAVENOUS ONCE
Status: DISCONTINUED | OUTPATIENT
Start: 2018-05-23 | End: 2018-05-24 | Stop reason: HOSPADM

## 2018-05-23 RX ORDER — FLUMAZENIL 0.1 MG/ML
0.2 INJECTION, SOLUTION INTRAVENOUS
Status: DISCONTINUED | OUTPATIENT
Start: 2018-05-23 | End: 2018-05-24 | Stop reason: HOSPADM

## 2018-05-23 RX ORDER — SIMETHICONE
LIQUID (ML) MISCELLANEOUS PRN
Status: DISCONTINUED | OUTPATIENT
Start: 2018-05-23 | End: 2018-05-23 | Stop reason: HOSPADM

## 2018-05-23 RX ADMIN — FENTANYL CITRATE 50 MCG: 50 INJECTION, SOLUTION INTRAMUSCULAR; INTRAVENOUS at 12:24

## 2018-05-23 RX ADMIN — FENTANYL CITRATE 100 MCG: 50 INJECTION, SOLUTION INTRAMUSCULAR; INTRAVENOUS at 12:22

## 2018-05-23 RX ADMIN — ONDANSETRON 4 MG: 2 INJECTION INTRAMUSCULAR; INTRAVENOUS at 13:00

## 2018-05-23 RX ADMIN — Medication 2 ML: at 10:55

## 2018-05-24 ENCOUNTER — CARE COORDINATION (OUTPATIENT)
Dept: GASTROENTEROLOGY | Facility: CLINIC | Age: 28
End: 2018-05-24

## 2018-05-24 NOTE — PROGRESS NOTES
Edited therapy plan to increase dosage to 10mg/kg.  In basket message to prior team. Pt aware           Crohn's disease with colonic involvement. Inflammation                        was found starting at 60cm from the anus, in the                        ascending colon and at the cecum. This was moderate in                        severity. Biopsied.        Lets increase Remicade to 10mg/kg     Thanks

## 2018-05-28 LAB — COPATH REPORT: NORMAL

## 2018-05-30 ENCOUNTER — CARE COORDINATION (OUTPATIENT)
Dept: GASTROENTEROLOGY | Facility: CLINIC | Age: 28
End: 2018-05-30

## 2018-05-30 NOTE — PROGRESS NOTES
Edited therapy plan to delete infliximab level .    No. Lets wait until after the June one            Previous Messages       ----- Message -----      From: Vivi Kate RN      Sent: 5/30/2018   1:59 PM        To: Jim Patten MD   Subject: RE: increae in remicade dose to 10mb/kg           Do we need to do the infliximab level with his June infusion appt as long as we have increased the dosage

## 2018-05-30 NOTE — PROGRESS NOTES
Called pt to let him know that the increased dose for remicade was approved.  Will check with Dr. Patten for need to do infliximab level with next infusion.          Just done with peer to peer - approved for 10mg/kg

## 2018-06-08 ENCOUNTER — INFUSION THERAPY VISIT (OUTPATIENT)
Dept: INFUSION THERAPY | Facility: CLINIC | Age: 28
End: 2018-06-08
Attending: INTERNAL MEDICINE
Payer: COMMERCIAL

## 2018-06-08 VITALS
WEIGHT: 124.7 LBS | SYSTOLIC BLOOD PRESSURE: 102 MMHG | BODY MASS INDEX: 21.24 KG/M2 | HEART RATE: 62 BPM | RESPIRATION RATE: 16 BRPM | DIASTOLIC BLOOD PRESSURE: 66 MMHG | OXYGEN SATURATION: 99 %

## 2018-06-08 DIAGNOSIS — K50.90 CROHN'S DISEASE WITHOUT COMPLICATION, UNSPECIFIED GASTROINTESTINAL TRACT LOCATION (H): Primary | ICD-10-CM

## 2018-06-08 LAB
ALBUMIN SERPL-MCNC: 3.9 G/DL (ref 3.4–5)
ALP SERPL-CCNC: 63 U/L (ref 40–150)
ALT SERPL W P-5'-P-CCNC: 29 U/L (ref 0–70)
AST SERPL W P-5'-P-CCNC: 16 U/L (ref 0–45)
BASOPHILS # BLD AUTO: 0 10E9/L (ref 0–0.2)
BASOPHILS NFR BLD AUTO: 0.2 %
BILIRUB DIRECT SERPL-MCNC: 0.3 MG/DL (ref 0–0.2)
BILIRUB SERPL-MCNC: 2 MG/DL (ref 0.2–1.3)
CRP SERPL-MCNC: <2.9 MG/L (ref 0–8)
DIFFERENTIAL METHOD BLD: NORMAL
EOSINOPHIL # BLD AUTO: 0.1 10E9/L (ref 0–0.7)
EOSINOPHIL NFR BLD AUTO: 0.9 %
ERYTHROCYTE [DISTWIDTH] IN BLOOD BY AUTOMATED COUNT: 12 % (ref 10–15)
ERYTHROCYTE [SEDIMENTATION RATE] IN BLOOD BY WESTERGREN METHOD: 7 MM/H (ref 0–15)
HCT VFR BLD AUTO: 42.2 % (ref 40–53)
HGB BLD-MCNC: 15.3 G/DL (ref 13.3–17.7)
IMM GRANULOCYTES # BLD: 0 10E9/L (ref 0–0.4)
IMM GRANULOCYTES NFR BLD: 0.3 %
LYMPHOCYTES # BLD AUTO: 1.9 10E9/L (ref 0.8–5.3)
LYMPHOCYTES NFR BLD AUTO: 28.7 %
MCH RBC QN AUTO: 32.4 PG (ref 26.5–33)
MCHC RBC AUTO-ENTMCNC: 36.3 G/DL (ref 31.5–36.5)
MCV RBC AUTO: 89 FL (ref 78–100)
MONOCYTES # BLD AUTO: 0.3 10E9/L (ref 0–1.3)
MONOCYTES NFR BLD AUTO: 5 %
NEUTROPHILS # BLD AUTO: 4.3 10E9/L (ref 1.6–8.3)
NEUTROPHILS NFR BLD AUTO: 64.9 %
NRBC # BLD AUTO: 0 10*3/UL
NRBC BLD AUTO-RTO: 0 /100
PLATELET # BLD AUTO: 326 10E9/L (ref 150–450)
PROT SERPL-MCNC: 7.7 G/DL (ref 6.8–8.8)
RBC # BLD AUTO: 4.72 10E12/L (ref 4.4–5.9)
WBC # BLD AUTO: 6.7 10E9/L (ref 4–11)

## 2018-06-08 PROCEDURE — 96413 CHEMO IV INFUSION 1 HR: CPT

## 2018-06-08 PROCEDURE — 96415 CHEMO IV INFUSION ADDL HR: CPT

## 2018-06-08 PROCEDURE — 86140 C-REACTIVE PROTEIN: CPT | Performed by: INTERNAL MEDICINE

## 2018-06-08 PROCEDURE — 25000128 H RX IP 250 OP 636: Mod: ZF | Performed by: INTERNAL MEDICINE

## 2018-06-08 PROCEDURE — 85025 COMPLETE CBC W/AUTO DIFF WBC: CPT | Performed by: INTERNAL MEDICINE

## 2018-06-08 PROCEDURE — 80076 HEPATIC FUNCTION PANEL: CPT | Performed by: INTERNAL MEDICINE

## 2018-06-08 PROCEDURE — 85652 RBC SED RATE AUTOMATED: CPT | Performed by: INTERNAL MEDICINE

## 2018-06-08 RX ORDER — DIPHENHYDRAMINE HCL 25 MG
25 CAPSULE ORAL ONCE
Status: CANCELLED
Start: 2018-06-08 | End: 2018-06-08

## 2018-06-08 RX ORDER — METHYLPREDNISOLONE SODIUM SUCCINATE 125 MG/2ML
125 INJECTION, POWDER, LYOPHILIZED, FOR SOLUTION INTRAMUSCULAR; INTRAVENOUS ONCE
Status: DISCONTINUED | OUTPATIENT
Start: 2018-06-08 | End: 2018-06-08 | Stop reason: HOSPADM

## 2018-06-08 RX ORDER — ACETAMINOPHEN 325 MG/1
650 TABLET ORAL ONCE
Status: DISCONTINUED | OUTPATIENT
Start: 2018-06-08 | End: 2018-06-08 | Stop reason: HOSPADM

## 2018-06-08 RX ORDER — DIPHENHYDRAMINE HCL 25 MG
25 CAPSULE ORAL ONCE
Status: DISCONTINUED | OUTPATIENT
Start: 2018-06-08 | End: 2018-06-08 | Stop reason: HOSPADM

## 2018-06-08 RX ORDER — ACETAMINOPHEN 325 MG/1
650 TABLET ORAL ONCE
Status: CANCELLED
Start: 2018-06-08 | End: 2018-06-08

## 2018-06-08 RX ORDER — METHYLPREDNISOLONE SODIUM SUCCINATE 125 MG/2ML
125 INJECTION, POWDER, LYOPHILIZED, FOR SOLUTION INTRAMUSCULAR; INTRAVENOUS ONCE
Status: CANCELLED | OUTPATIENT
Start: 2018-06-08 | End: 2018-06-08

## 2018-06-08 RX ADMIN — INFLIXIMAB 600 MG: 100 INJECTION, POWDER, LYOPHILIZED, FOR SOLUTION INTRAVENOUS at 13:16

## 2018-06-08 NOTE — PATIENT INSTRUCTIONS
Dear Emil Garcia    Thank you for choosing [facility] ([facility]) for your infusion.  The following information is a summary of our appointment as well as important reminders.      EDUCATION POST BIOLOGICAL/CHEMOTHERAPY INFUSION  Call the triage nurse at your clinic or seek medical attention if you have chills and/or temperature greater than or equal to 100.5, uncontrolled nausea/vomiting, diarrhea, constipation, dizziness, shortness of breath, chest pain, heart palpitations, weakness or any other new or concerning symptoms, questions or concerns.  You can not have any live virus vaccines prior to or during treatment or up to 6 months post infusion.  If you have an upcoming surgery, medical procedure or dental procedure during treatment, this should be discussed with your ordering physician and your surgeon/dentist.  If you are having any concerning symptom, if you are unsure if you should get your next infusion or wish to speak to a provider before your next infusion, please call your care coordinator or triage nurse at your clinic to notify them so we can adequately serve you.    Additional information: you received your infusion of Remicade 600 mg via IV today.       We look forward in seeing you on your next appointment here at [facility].  Please don t hesitate to call us at [phone] to reschedule any of your appointments or to speak with one of the [facility] registered nurses.  It was a pleasure taking care of you today.    Sincerely,  Jaquelin Rodriguez RN  [facility]  [address] [place] [address]  [id number]  Phone:  [phone]      Infliximab Solution for injection  What is this medicine?  INFLIXIMAB (in FLIX i mab) is used to treat Crohn's disease and ulcerative colitis. It is also used to treat ankylosing spondylitis, psoriasis, and some forms of arthritis.  This medicine may be  used for other purposes; ask your health care provider or pharmacist if you have questions.  What should I tell my health care provider before I take this medicine?  They need to know if you have any of these conditions:    diabetes    exposure to tuberculosis    heart failure    hepatitis or liver disease    immune system problems    infection    lung or breathing disease, like COPD    multiple sclerosis    current or past resident of [place]    seizure disorder    an unusual or allergic reaction to infliximab, mouse proteins, other medicines, foods, dyes, or preservatives    pregnant or trying to get pregnant    breast-feeding  How should I use this medicine?  This medicine is for injection into a vein. It is usually given by a health care professional in a hospital or clinic setting.  A special MedGuide will be given to you by the pharmacist with each prescription and refill. Be sure to read this information carefully each time.  Talk to your pediatrician regarding the use of this medicine in children. Special care may be needed.  Overdosage: If you think you have taken too much of this medicine contact a poison control center or emergency room at once.  NOTE: This medicine is only for you. Do not share this medicine with others.  What if I miss a dose?  It is important not to miss your dose. Call your doctor or health care professional if you are unable to keep an appointment.  What may interact with this medicine?  Do not take this medicine with any of the following medications:    anakinra    rilonacept  This medicine may also interact with the following medications:    vaccines  This list may not describe all possible interactions. Give your health care provider a list of all the medicines, herbs, non-prescription drugs, or dietary supplements you use. Also tell them if you smoke, drink alcohol, or use illegal drugs. Some items may interact with your medicine.  What should I watch  for while using this medicine?  Visit your doctor or health care professional for regular checks on your progress.  If you get a cold or other infection while receiving this medicine, call your doctor or health care professional. Do not treat yourself. This medicine may decrease your body's ability to fight infections. Before beginning therapy, your doctor may do a test to see if you have been exposed to tuberculosis.  This medicine may make the symptoms of heart failure worse in some patients. If you notice symptoms such as increased shortness of breath or swelling of the ankles or legs, contact your health care provider right away.  If you are going to have surgery or dental work, tell your health care professional or dentist that you have received this medicine.  If you take this medicine for plaque psoriasis, stay out of the sun. If you cannot avoid being in the sun, wear protective clothing and use sunscreen. Do not use sun lamps or tanning beds/booths.  What side effects may I notice from receiving this medicine?  Side effects that you should report to your doctor or health care professional as soon as possible:    allergic reactions like skin rash, itching or hives, swelling of the face, lips, or tongue    chest pain    fever or chills, usually related to the infusion    muscle or joint pain    red, scaly patches or raised bumps on the skin    signs of infection - fever or chills, cough, sore throat, pain or difficulty passing urine    swollen lymph nodes in the neck, underarm, or groin areas    unexplained weight loss    unusual bleeding or bruising    unusually weak or tired    yellowing of the eyes or skin  Side effects that usually do not require medical attention (report to your doctor or health care professional if they continue or are bothersome):    headache    heartburn or stomach pain    nausea, vomiting  This list may not describe all possible side effects. Call your doctor for medical advice about  side effects. You may report side effects to FDA at 0-769-FDA-1178.  Where should I keep my medicine?  This drug is given in a hospital or clinic and will not be stored at home.  NOTE:This sheet is a summary. It may not cover all possible information. If you have questions about this medicine, talk to your doctor, pharmacist, or health care provider. Copyright  2016 [employer]            Budesonide gastro-resistant capsules and extended-release tablets  Brand Names: Entocort EC, UCERIS  What is this medicine?  BUDESONIDE (bue WAGNER oh nide) is a corticosteroid. It is used in the treatment of Crohn's disease and ulcerative colitis which are types of inflammatory bowel disease.  How should I use this medicine?  Take this medicine by mouth with a glass of water. Follow the directions on the prescription label. Do not cut, chew, crush, or break open this medicine. Take your dose in the morning. Take your doses at regular intervals. Do not take your medicine more often than directed. Do not stop taking this medicine except on the advice of your doctor or health care professional.  Talk to your pediatrician regarding the use of this medicine in children. Special care may be needed.  What side effects may I notice from receiving this medicine?  Side effects that you should report to your doctor or health care professional as soon as possible:    allergic reactions like skin rash, itching or hives, swelling of the face, lips, or tongue    changes in vision    fever, sore throat, sneezing, cough, or other signs of infection, wounds that will not heal    frequent passing of urine    increased thirst    mental depression, mood swings, mistaken feelings of self-importance or of being mistreated    pain in hips, back, ribs, arms, shoulders, or legs    severe stomach pain    swelling of feet or lower legs    unusually weak or tired    vomiting  Side effects that usually do not require medical attention (report to your doctor or  health care professional if they continue or are bothersome):    headache    increased appetite    nausea    skin problems, acne, thin and shiny skin  What may interact with this medicine?  Do not take this medicine with any of the following medications:    mifepristone  This medicine may also interact with the following medications:    anastrozole    antacids    cimetidine    grapefruit juice    ketoconazole  What if I miss a dose?  If you miss a dose, take it as soon as you can. If it is almost time for your next dose, take only that dose. Do not take double or extra doses.  Where should I keep my medicine?  Keep out of the reach of children.  Store at room temperature between 15 and 30 degrees C (59 and 86 degrees F). Keep container tightly closed. Throw away any unused medicine after the expiration date.  What should I tell my health care provider before I take this medicine?  They need to know if you have any of these conditions:    any active infection    cataracts    diabetes    immune system problems    glaucoma    high blood pressure    history of stomach bleeding or stomach ulcers    liver disease    osteopetrosis    an unusual or allergic reaction to budesonide, other corticosteroids, medicines, foods, dyes, or preservatives    pregnant or trying to get pregnant    breast-feeding  What should I watch for while using this medicine?  This medicine may increase your risk of getting an infection. Stay away from people who are sick. Tell your doctor or health care professional if you are around anyone with measles or chickenpox.  You may need to avoid receiving certain vaccines or may need to have changes in the vaccination schedules to ensure adequate protection from certain diseases. Make sure to tell your doctor or health care professional that you are taking this medicine before receiving any vaccine.  If you are going to have surgery, tell your doctor or health care professional that you are taking this  medicine.  This medicine can affect blood sugar levels. If you have diabetes, check with your doctor or health care professional before you change your diet or the dose of your diabetic medicine.  Alcohol, grapefruit, and grapefruit juice can increase the risk of getting serious side effects while you are taking this medicine. Avoid grapefruit, grapefruit juice, and alcoholic drinks while taking this medicine.  NOTE:This sheet is a summary. It may not cover all possible information. If you have questions about this medicine, talk to your doctor, pharmacist, or health care provider. Copyright  2018 [employer]

## 2018-06-08 NOTE — MR AVS SNAPSHOT
After Visit Summary   6/8/2018    Emil Garcia    MRN: [id number]           Patient Information     Date Of Birth          1990        Visit Information        Provider Department      6/8/2018 12:00 PM UC 50 ATC; UC SPEC INFUSION [facility]        Today's Diagnoses     Crohn's disease without complication, unspecified gastrointestinal tract location (H)    -  1      Care Instructions    Dear Emil Garcia    Thank you for choosing [facility] ([facility]) for your infusion.  The following information is a summary of our appointment as well as important reminders.      EDUCATION POST BIOLOGICAL/CHEMOTHERAPY INFUSION  Call the triage nurse at your clinic or seek medical attention if you have chills and/or temperature greater than or equal to 100.5, uncontrolled nausea/vomiting, diarrhea, constipation, dizziness, shortness of breath, chest pain, heart palpitations, weakness or any other new or concerning symptoms, questions or concerns.  You can not have any live virus vaccines prior to or during treatment or up to 6 months post infusion.  If you have an upcoming surgery, medical procedure or dental procedure during treatment, this should be discussed with your ordering physician and your surgeon/dentist.  If you are having any concerning symptom, if you are unsure if you should get your next infusion or wish to speak to a provider before your next infusion, please call your care coordinator or triage nurse at your clinic to notify them so we can adequately serve you.    Additional information: you received your infusion of Remicade 600 mg via IV today.       We look forward in seeing you on your next appointment here at [facility].  Please don t hesitate to call us at [phone] to reschedule any of your appointments or to speak with one of the [facility] registered nurses.  It was a pleasure taking  care of you today.    Sincerely,  Jaquelin Rodriguez RN  [facility]  [facility]  [address] [place]  [address]  Phone:  [phone]      Infliximab Solution for injection  What is this medicine?  INFLIXIMAB (in FLIX i mab) is used to treat Crohn's disease and ulcerative colitis. It is also used to treat ankylosing spondylitis, psoriasis, and some forms of arthritis.  This medicine may be used for other purposes; ask your health care provider or pharmacist if you have questions.  What should I tell my health care provider before I take this medicine?  They need to know if you have any of these conditions:    diabetes    exposure to tuberculosis    heart failure    hepatitis or liver disease    immune system problems    infection    lung or breathing disease, like COPD    multiple sclerosis    current or past resident of [place]    seizure disorder    an unusual or allergic reaction to infliximab, mouse proteins, other medicines, foods, dyes, or preservatives    pregnant or trying to get pregnant    breast-feeding  How should I use this medicine?  This medicine is for injection into a vein. It is usually given by a health care professional in a hospital or clinic setting.  A special MedGuide will be given to you by the pharmacist with each prescription and refill. Be sure to read this information carefully each time.  Talk to your pediatrician regarding the use of this medicine in children. Special care may be needed.  Overdosage: If you think you have taken too much of this medicine contact a poison control center or emergency room at once.  NOTE: This medicine is only for you. Do not share this medicine with others.  What if I miss a dose?  It is important not to miss your dose. Call your doctor or health care professional if you are unable to keep an appointment.  What may interact with this medicine?  Do not take this  medicine with any of the following medications:    anakinra    rilonacept  This medicine may also interact with the following medications:    vaccines  This list may not describe all possible interactions. Give your health care provider a list of all the medicines, herbs, non-prescription drugs, or dietary supplements you use. Also tell them if you smoke, drink alcohol, or use illegal drugs. Some items may interact with your medicine.  What should I watch for while using this medicine?  Visit your doctor or health care professional for regular checks on your progress.  If you get a cold or other infection while receiving this medicine, call your doctor or health care professional. Do not treat yourself. This medicine may decrease your body's ability to fight infections. Before beginning therapy, your doctor may do a test to see if you have been exposed to tuberculosis.  This medicine may make the symptoms of heart failure worse in some patients. If you notice symptoms such as increased shortness of breath or swelling of the ankles or legs, contact your health care provider right away.  If you are going to have surgery or dental work, tell your health care professional or dentist that you have received this medicine.  If you take this medicine for plaque psoriasis, stay out of the sun. If you cannot avoid being in the sun, wear protective clothing and use sunscreen. Do not use sun lamps or tanning beds/booths.  What side effects may I notice from receiving this medicine?  Side effects that you should report to your doctor or health care professional as soon as possible:    allergic reactions like skin rash, itching or hives, swelling of the face, lips, or tongue    chest pain    fever or chills, usually related to the infusion    muscle or joint pain    red, scaly patches or raised bumps on the skin    signs of infection - fever or chills, cough, sore throat, pain or difficulty passing urine    swollen lymph nodes in  the neck, underarm, or groin areas    unexplained weight loss    unusual bleeding or bruising    unusually weak or tired    yellowing of the eyes or skin  Side effects that usually do not require medical attention (report to your doctor or health care professional if they continue or are bothersome):    headache    heartburn or stomach pain    nausea, vomiting  This list may not describe all possible side effects. Call your doctor for medical advice about side effects. You may report side effects to FDA at 5-432-WYH-7213.  Where should I keep my medicine?  This drug is given in a hospital or clinic and will not be stored at home.  NOTE:This sheet is a summary. It may not cover all possible information. If you have questions about this medicine, talk to your doctor, pharmacist, or health care provider. Copyright  2016 Gold Standard            Budesonide gastro-resistant capsules and extended-release tablets  Brand Names: Entocort EC, UCERIS  What is this medicine?  BUDESONIDE (bue WAGNER oh nide) is a corticosteroid. It is used in the treatment of Crohn's disease and ulcerative colitis which are types of inflammatory bowel disease.  How should I use this medicine?  Take this medicine by mouth with a glass of water. Follow the directions on the prescription label. Do not cut, chew, crush, or break open this medicine. Take your dose in the morning. Take your doses at regular intervals. Do not take your medicine more often than directed. Do not stop taking this medicine except on the advice of your doctor or health care professional.  Talk to your pediatrician regarding the use of this medicine in children. Special care may be needed.  What side effects may I notice from receiving this medicine?  Side effects that you should report to your doctor or health care professional as soon as possible:    allergic reactions like skin rash, itching or hives, swelling of the face, lips, or tongue    changes in vision    fever, sore  throat, sneezing, cough, or other signs of infection, wounds that will not heal    frequent passing of urine    increased thirst    mental depression, mood swings, mistaken feelings of self-importance or of being mistreated    pain in hips, back, ribs, arms, shoulders, or legs    severe stomach pain    swelling of feet or lower legs    unusually weak or tired    vomiting  Side effects that usually do not require medical attention (report to your doctor or health care professional if they continue or are bothersome):    headache    increased appetite    nausea    skin problems, acne, thin and shiny skin  What may interact with this medicine?  Do not take this medicine with any of the following medications:    mifepristone  This medicine may also interact with the following medications:    anastrozole    antacids    cimetidine    grapefruit juice    ketoconazole  What if I miss a dose?  If you miss a dose, take it as soon as you can. If it is almost time for your next dose, take only that dose. Do not take double or extra doses.  Where should I keep my medicine?  Keep out of the reach of children.  Store at room temperature between 15 and 30 degrees C (59 and 86 degrees F). Keep container tightly closed. Throw away any unused medicine after the expiration date.  What should I tell my health care provider before I take this medicine?  They need to know if you have any of these conditions:    any active infection    cataracts    diabetes    immune system problems    glaucoma    high blood pressure    history of stomach bleeding or stomach ulcers    liver disease    osteopetrosis    an unusual or allergic reaction to budesonide, other corticosteroids, medicines, foods, dyes, or preservatives    pregnant or trying to get pregnant    breast-feeding  What should I watch for while using this medicine?  This medicine may increase your risk of getting an infection. Stay away from people who are sick. Tell your doctor or health  care professional if you are around anyone with measles or chickenpox.  You may need to avoid receiving certain vaccines or may need to have changes in the vaccination schedules to ensure adequate protection from certain diseases. Make sure to tell your doctor or health care professional that you are taking this medicine before receiving any vaccine.  If you are going to have surgery, tell your doctor or health care professional that you are taking this medicine.  This medicine can affect blood sugar levels. If you have diabetes, check with your doctor or health care professional before you change your diet or the dose of your diabetic medicine.  Alcohol, grapefruit, and grapefruit juice can increase the risk of getting serious side effects while you are taking this medicine. Avoid grapefruit, grapefruit juice, and alcoholic drinks while taking this medicine.  NOTE:This sheet is a summary. It may not cover all possible information. If you have questions about this medicine, talk to your doctor, pharmacist, or health care provider. Copyright  2018 Elsevier                Follow-ups after your visit        Your next 10 appointments already scheduled     Aug 03, 2018 12:00 PM CDT   Infusion 120 with GERARDO SPEC INFUSION, UC 50 ATC   [facility] Special[facility] ([facility])    [address]  [address]  [facility] [id number] [phone]            Aug 03, 2018  2:00 PM CDT   Nurse Visit with Gerardo Pcc Nurse   [facility] ([facility])    [address] [place] [address]  4th Floor  [facility] [id number] [phone]              Future tests that were ordered for you today     Open Standing Orders        Priority Remaining Interval Expires Ordered    Notify Physician Routine [id number] PRN  6/8/2018            Who to contact     If you have questions or need follow up information about today's clinic visit or your schedule  please contact [facility] PROCEDURE directly at [phone].  Normal or non-critical lab and imaging results will be communicated to you by MyChart, letter or phone within 4 business days after the clinic has received the results. If you do not hear from us within 7 days, please contact the clinic through [employer] or phone. If you have a critical or abnormal lab result, we will notify you by phone as soon as possible.  Submit refill requests through "[employer] or call your pharmacy and they will forward the refill request to us. Please allow 3 business days for your refill to be completed.          Additional Information About Your Visit        [employer] Information     "[employer] gives you secure access to your electronic health record. If you see a primary care provider, you can also send messages to your care team and make appointments. If you have questions, please call your primary care clinic.  If you do not have a primary care provider, please call [phone] and they will assist you.        Care EveryWhere ID     This is your Care EveryWhere ID. This could be used by other organizations to access your [place] medical records  [id number]        Your Vitals Were     Pulse Respirations Pulse Oximetry BMI (Body Mass Index)          62 16 99% 21.24 kg/m2         Blood Pressure from Last 3 Encounters:   06/08/18 102/66   05/23/18 101/68   05/04/18 111/71    Weight from Last 3 Encounters:   06/08/18 56.6 kg (124 lb 11.2 oz)   05/04/18 60.7 kg (133 lb 12.8 oz)   04/13/18 64.7 kg (142 lb 9.6 oz)              We Performed the Following     CBC with platelets differential     CRP inflammation     Erythrocyte sedimentation rate auto     Hepatic panel        Primary Care Provider Fax #    Physician No Ref-Primary [phone]       No address on file        Equal Access to Services     HAYLEE ALANIS : cecil Anthony, beatris pedroza  elgin warrenevansmaite howardtejassusan nilton. So [facility] [phone].    ATENCIÓN: Si habla roberto, tiene a perkins disposición servicios gratuitos de asistencia lingüística. Richard al [phone].    We comply with applicable federal civil rights laws and [place] laws. We do not discriminate on the basis of race, color, national origin, age, disability, sex, sexual orientation, or gender identity.            Thank you!     Thank you for choosing [facility] SPECIALTY AND PROCEDURE  for your care. Our goal is always to provide you with excellent care. Hearing back from our patients is one way we can continue to improve our services. Please take a few minutes to complete the written survey that you may receive in the mail after your visit with us. Thank you!             Your Updated Medication List - Protect others around you: Learn how to safely use, store and throw away your medicines at www.disposemymeds.org.          This list is accurate as of 6/8/18  3:22 PM.  Always use your most recent med list.                   Brand Name Dispense Instructions for use Diagnosis    BENADRYL PO      Take 50 mg by mouth as needed        budesonide 3 MG EC capsule    ENTOCORT EC    270 capsule    Take 3 capsules (9 mg) by mouth every morning    Crohn's disease of large intestine without complication (H)       inFLIXimab 100 MG injection    REMICADE     100 mg IV every 8 weeks        MULTIVITAL PO      Take  by mouth.

## 2018-06-08 NOTE — PROGRESS NOTES
~~~ NOTE: If the patient answers yes to any of the questions below, hold the infusion and contact ordering provider or on-call provider.    1. Have you recently had an elevated temperature, fever, chills, productive cough, coughing for 3 weeks or longer or hemoptysis,  abnormal vital signs, night sweats,  chest pain or have you noticed a decrease in your appetite, unexplained weight loss or fatigue? No  2. Do you have any open wounds or new incisions? No  3. Do you have any recent or upcoming hospitalizations, surgeries or dental procedures? No  4. Do you currently have or recently have had any signs of illness or infection or are you on any antibiotics? No  5. Have you had any new, sudden or worsening abdominal pain? No  6. Have you or anyone in your household received a live vaccination in the past 4 weeks? Please note:  No live vaccines while on biologic/chemotherapy until 6 months after the last treatment.  Patient can receive the flu vaccine (shot only) and the pneumovax.  It is optimal for the patient to get these vaccines mid cycle, but they can be given at any time as long as it is not on the day of the infusion. No  7. Have you recently been diagnosed with any new nervous system diseases (ie. Multiple sclerosis, Guillain [place], seizures, neurological changes) or cancer diagnosis? Are you on any form of radiation or chemotherapy? No  8. Are you pregnant or breast feeding or do you have plans of pregnancy in the future? No  9. Have you been having any signs of worsening depression or suicidal ideations?  (benlysta only) No  10. Have there been any other new onset medical symptoms? No    Nursing Note  Emil Garcia [facility] for:   Chief Complaint   Patient presents with     Infusion     Remicade     During today's [facility] appointment, orders from Dr. Patten were completed.  Frequency: every 8 weeks    Pt still has ongoing  intermittent abd pain/discomfort, similar to bloating. Remicade dose increased today. Pt also started on budesonide; medication handout provided to answer pt questions r/t new medication. Encouraged to contact ordering provider with any other specific questions. Pt verbalized understanding.     Progress note:  Patient identification verified by name and date of birth.  Assessment completed.  Vitals recorded in Doc Flowsheets.  Patient was provided with education regarding infusion and possible side effects.  Patient verbalized understanding.      needed: No  Premedications: historically patient has not taken pre-medications prior to infusion.  Infusion Rates: infusion given over 2 hours per pt request d/t dose increase of 10 mg/kg following recent flare of Crohn's. Scope performed 5/23 and confirmed moderate inflammation.   Approximate Infusion length:2 hours.   Labs: were drawn per orders.   Vascular access: peripheral IV placed today.  Treatment Conditions: Biologic checklist performed prior to infusion; patient denies fever, chills, signs of infection, recent illness, on antibiotics, productive cough or elevated temperature.  Patient tolerated infusion: well.         Discharge Plan:   Follow up plan of care with: ongoing infusions at [facility].  Discharge instructions were reviewed with patient.  Patient/representative verbalized understanding of discharge instructions and all questions answered.  Patient discharged from [facility] in stable condition.    Jaquelin Rodriguez RN          Administrations This Visit     inFLIXimab (REMICADE) 600 mg in sodium chloride 0.9 % 335 mL infusion     Admin Date Action Dose Rate Route Administered By          06/08/2018 New Bag 600 mg 335 mL/hr Intravenous Jaquelin Rodriguez RN                             BP (P) 105/64  Resp (P) 18  Wt 56.6 kg (124 lb 11.2 oz)  SpO2 (P) 98%  BMI 21.24 kg/m2

## 2018-06-17 ENCOUNTER — MYC MEDICAL ADVICE (OUTPATIENT)
Dept: GASTROENTEROLOGY | Facility: CLINIC | Age: 28
End: 2018-06-17

## 2018-06-17 DIAGNOSIS — K50.118 CROHN'S DISEASE OF LARGE INTESTINE WITH OTHER COMPLICATION (H): Primary | ICD-10-CM

## 2018-06-19 DIAGNOSIS — K50.118 CROHN'S DISEASE OF LARGE INTESTINE WITH OTHER COMPLICATION (H): ICD-10-CM

## 2018-06-22 ENCOUNTER — CARE COORDINATION (OUTPATIENT)
Dept: GASTROENTEROLOGY | Facility: CLINIC | Age: 28
End: 2018-06-22

## 2018-06-22 DIAGNOSIS — K50.90 CROHN'S DISEASE (H): Primary | ICD-10-CM

## 2018-06-22 LAB — CALPROTECTIN STL-MCNT: 125.8 MG/KG (ref 0–49.9)

## 2018-06-22 RX ORDER — DICYCLOMINE HYDROCHLORIDE 10 MG/1
CAPSULE ORAL
Qty: 120 CAPSULE | Refills: 3 | Status: SHIPPED | OUTPATIENT
Start: 2018-06-22 | End: 2018-10-26

## 2018-06-25 ENCOUNTER — TELEPHONE (OUTPATIENT)
Dept: GASTROENTEROLOGY | Facility: CLINIC | Age: 28
End: 2018-06-25

## 2018-06-25 NOTE — TELEPHONE ENCOUNTER
[facility] Call Center    Phone Message    May a detailed message be left on voicemail: yes    Reason for Call: Symptoms or Concerns     If patient has red-flag symptoms, warm transfer to triage line    Current symptom or concern: Abdominal discomfort    Symptoms have been present for:  3+ week(s)    Has patient previously been seen for this? Yes    By Dr. Patten    Are there any new or worsening symptoms? Yes: Abdominal discomfort      Action Taken: Message routed to:  [facility] ([facility] GI

## 2018-06-25 NOTE — TELEPHONE ENCOUNTER
Called pt and pt states that he is just about the same as he has been for the last 3 weeks. Refuses to try bentyl for cramping as one of the side effects is constipation.  Continues to take entocort.  Fecal calprotectin is better.  Scheduled pt on July 10.         Vivi  - lets overbook him on July 10th.  Can you figure out best spot - looks like all follow-ups that days,

## 2018-06-26 ASSESSMENT — ENCOUNTER SYMPTOMS
WEIGHT GAIN: 0
INCREASED ENERGY: 1
MUSCLE CRAMPS: 0
STIFFNESS: 1
TASTE DISTURBANCE: 0
DIARRHEA: 0
NECK MASS: 0
ABDOMINAL PAIN: 1
SINUS CONGESTION: 0
BLOATING: 1
BLOOD IN STOOL: 0
NIGHT SWEATS: 0
NAUSEA: 0
POLYPHAGIA: 1
CHILLS: 0
HOARSE VOICE: 1
HALLUCINATIONS: 0
TROUBLE SWALLOWING: 0
CONSTIPATION: 1
BOWEL INCONTINENCE: 0
JOINT SWELLING: 0
FEVER: 0
RECTAL PAIN: 1
BACK PAIN: 0
VOMITING: 0
MYALGIAS: 0
ARTHRALGIAS: 0
POLYDIPSIA: 0
ALTERED TEMPERATURE REGULATION: 0
SMELL DISTURBANCE: 0
JAUNDICE: 0
NECK PAIN: 0
WEIGHT LOSS: 1
HEARTBURN: 0
FATIGUE: 1
DECREASED APPETITE: 0
SORE THROAT: 0
MUSCLE WEAKNESS: 0
SINUS PAIN: 0

## 2018-07-02 ENCOUNTER — TELEPHONE (OUTPATIENT)
Dept: GASTROENTEROLOGY | Facility: CLINIC | Age: 28
End: 2018-07-02

## 2018-07-02 ENCOUNTER — CARE COORDINATION (OUTPATIENT)
Dept: GASTROENTEROLOGY | Facility: CLINIC | Age: 28
End: 2018-07-02

## 2018-07-02 DIAGNOSIS — K50.90 CROHN'S DISEASE (H): Primary | ICD-10-CM

## 2018-07-02 RX ORDER — MESALAMINE 1.2 G/1
4800 TABLET, DELAYED RELEASE ORAL DAILY
Qty: 120 TABLET | Refills: 3 | Status: SHIPPED | OUTPATIENT
Start: 2018-07-02 | End: 2018-10-23

## 2018-07-02 RX ORDER — MESALAMINE 800 MG/1
2400 TABLET, DELAYED RELEASE ORAL 2 TIMES DAILY
Qty: 180 TABLET | Refills: 3 | Status: SHIPPED | OUTPATIENT
Start: 2018-07-02 | End: 2018-07-10

## 2018-07-02 NOTE — TELEPHONE ENCOUNTER
[facility] Call Center    Phone Message    May a detailed message be left on voicemail: yes    Reason for Call: Medication Question or concern regarding medication   Prescription Clarification  Name of Medication: Mesalamine   Prescribing Provider: Sandor   Pharmacy: [facility]    What on the order needs clarification? 2 different dosage strengths were sent over with today's script.           Action Taken: Message routed to:  [facility] ([facility] GI

## 2018-07-02 NOTE — PROGRESS NOTES
Called pt and discussed asacol versus lialda and sent both to pt pharmacy. Called pt and discussed medications.  Pt has an appt on July 10th.

## 2018-07-06 ENCOUNTER — TELEPHONE (OUTPATIENT)
Dept: GASTROENTEROLOGY | Facility: CLINIC | Age: 28
End: 2018-07-06

## 2018-07-10 ENCOUNTER — MEDICAL CORRESPONDENCE (OUTPATIENT)
Dept: HEALTH INFORMATION MANAGEMENT | Facility: CLINIC | Age: 28
End: 2018-07-10

## 2018-07-10 ENCOUNTER — OFFICE VISIT (OUTPATIENT)
Dept: GASTROENTEROLOGY | Facility: CLINIC | Age: 28
End: 2018-07-10
Payer: COMMERCIAL

## 2018-07-10 ENCOUNTER — OFFICE VISIT (OUTPATIENT)
Dept: PHARMACY | Facility: CLINIC | Age: 28
End: 2018-07-10
Payer: COMMERCIAL

## 2018-07-10 VITALS
HEART RATE: 73 BPM | DIASTOLIC BLOOD PRESSURE: 62 MMHG | HEIGHT: 65 IN | TEMPERATURE: 98.2 F | SYSTOLIC BLOOD PRESSURE: 106 MMHG | BODY MASS INDEX: 20.03 KG/M2 | RESPIRATION RATE: 16 BRPM | WEIGHT: 120.2 LBS | OXYGEN SATURATION: 100 %

## 2018-07-10 DIAGNOSIS — K50.90 CROHN'S DISEASE WITHOUT COMPLICATION, UNSPECIFIED GASTROINTESTINAL TRACT LOCATION (H): Primary | ICD-10-CM

## 2018-07-10 DIAGNOSIS — K50.10 CROHN'S DISEASE OF LARGE INTESTINE WITHOUT COMPLICATION (H): Primary | ICD-10-CM

## 2018-07-10 PROCEDURE — 99605 MTMS BY PHARM NP 15 MIN: CPT | Performed by: PHARMACIST

## 2018-07-10 ASSESSMENT — PAIN SCALES - GENERAL: PAINLEVEL: MILD PAIN (3)

## 2018-07-10 NOTE — NURSING NOTE
"Chief Complaint   Patient presents with     Gastrointestinal Problem     Crohn's Disease       Vitals:    07/10/18 1148   BP: 106/62   BP Location: Left arm   Patient Position: Sitting   Cuff Size: Adult Regular   Pulse: 73   Resp: 16   Temp: 98.2  F (36.8  C)   TempSrc: Oral   SpO2: 100%   Weight: 54.5 kg (120 lb 3.2 oz)   Height: 1.66 m (5' 5.35\")       Body mass index is 19.79 kg/(m^2).      Leonor Simms LPN                          "

## 2018-07-10 NOTE — PROGRESS NOTES
SUBJECTIVE/OBJECTIVE:                           Emil Garcia is a 27 year old male coming in for an initial visit for Medication Therapy Management.  He was referred to me from Dr. Patten, and was seen by him immediately prior to visit today.     Chief Complaint: None- general medication review    Allergies/ADRs: Reviewed in Epic  Tobacco: No tobacco use  Alcohol: 1-3 beverages / week    Medication Adherence/Access:  no issues reported    Crohn's: Current therapy includes budesonide 9 mg in the morning, Bentyl 10 mg BID up to four times daily (has not tried yet), diphenhydramine 50 mg by mouth PRN (not needing), infliximab 100 mg IV every 8 weeks, mesalamine 4,800 mg daily, and a multivitamin (taking sometimes). Tolerating medications/infusion well. His main concern is the side effect of Bentyl (constipation) so he hasn't taken this yet for spasms. Other recommendations from Dr. Patten today include drug level as well as increase in fiber. He reports his main diet consists of liquids.     Plan from Dr. Patten:  -- Labs today and with every infusion (CBC, LFTs, CRP, ESR)   -- Continue infliximab every 8 weeks  -- Infliximab level to be obtained prior to next infusion  -- Pending fecal keegan, will determine need for endoscopic assessment  --MiraLAX 3 times a day and titrate to stool frequency and consistency.       Most Recent Immunizations   Administered Date(s) Administered     HPV9 04/13/2018     Influenza Vaccine IM 3yrs+ 4 Valent IIV4 10/14/2014     MMR 05/11/2004     Meningococcal (Menactra ) 06/25/2007     Meningococcal (Menveo ) 07/31/2012     OPV, unspecified 05/03/1993     Pneumo Conj 13-V (2010&after) 12/31/2015     Pneumococcal 23 valent 01/17/2018     TDAP Vaccine (Boostrix) 07/28/2009     Td (Adult), Adsorbed 05/11/2004     Twinrix A/B 02/16/2018     Varicella 07/04/1999     IBD Health Care Maintenance:    Vaccinations:  All patients on biologics should avoid live vaccines.    -- Influenza (every year)  last 2017  -- TdaP (every 10 years) last 2009  -- Pneumococcal Pneumonia (once plus booster at 5 years) Prevnar-13 2015 and Pneumovax-23 2018  -- Yearly assessment for latent Tb (verbal screening and exam, PPD or QuantiFERON-Tb testing)10/2017 negative Quantiferon    One time confirmation of immunity or serologies:  -- Hepatitis A (serologies or immunizations) Twinirix (two doses)  -- Hepatitis B (serologies or immunizations) Twinirix (two doses)  -- Varicella 1999  -- MMR 2004  -- HPV (all aged 18-26) 1/2018 and 4/2018 (2/3 doses)   -- Meningococcal meningitis (all patients at risk for meningitis)-- vaccinated    Bone mineral density screening   -- Recommend all patients supplement with calcium and vitamin D    Cancer Screening:  Skin cancer screening: Annual visual exam of skin by dermatologist since patient is immunocompromised    Misc:  -- Avoid tobacco use  -- Avoid NSAIDs as there is potentially a 25% chance of causing an IBD flare      Current labs include:  Today's Vitals: /62 mmHg HR 73 bpm Weight 120 lbs 3.2 ox (54.5 kg)  BP Readings from Last 3 Encounters:   07/10/18 106/62   06/08/18 102/66   05/23/18 101/68     Liver Function Studies -   Recent Labs   Lab Test  06/08/18   1245   PROTTOTAL  7.7   ALBUMIN  3.9   BILITOTAL  2.0*   ALKPHOS  63   AST  16   ALT  29     Last Basic Metabolic Panel:  Lab Results   Component Value Date     04/06/2011      Lab Results   Component Value Date    POTASSIUM 3.4 04/06/2011     Lab Results   Component Value Date    CHLORIDE 107 04/06/2011     Lab Results   Component Value Date    BUN 9 04/06/2011     Lab Results   Component Value Date    CR 0.90 04/06/2011     GFR Estimate   Date Value Ref Range Status   04/06/2011 >90 >60 mL/min/1.7m2 Final     ASSESSMENT:                             Current medications were reviewed today.     Medication Adherence: good    Crohn's: Needs improvement. Patient would benefit from following Dr. Patten's recommendations as  directed. We discussed indication and potential mechanisms for each of his medications today, as well as the potential side effect of dicyclomine (constipation) given its anticholinergic properties. He is aware of recommendations from today's visit from Dr. Patten - reviewed again during visit.  Encouraged him to reach out with medication questions at any time. No other questions at this time.    PLAN:                            1. Pt to continue current medications and follow-up as directed by Dr. Patten    I spent 15 minutes with this patient today. I offer these suggestions for consideration by the PCP. A copy of the visit note was provided to the patient's GI provider.    Will follow up in 6 months, sooner if needed.    The patient was sent via [employer] a summary of these recommendations as an after visit summary.     Karen Wu PharmD   [facility] Pharmacist   Phone: [phone]

## 2018-07-10 NOTE — LETTER
7/10/2018       RE: Emil Garcia  [address] [id number]     Dear Colleague,    Thank you for referring your patient, Emil Garcia, to the [facility] at [facility]. Please see a copy of my visit note below.    IBD CLINIC VISIT     CC/REFERRING MD:  Self referred  REASON FOR FOLLOW UP: Crohn's   COLLABORATING PHYSICIAN: Jim Patten MD    IBD HISTORY  Age at diagnosis: 23, 2014  Extent of disease: R colon/cecum  Disease phenotype: inflammation  Cathy-anal disease: None  Current CD medications:   - Infliximab 5mg/kg q8 weeks (started 2014)   - Infliximab 10mg/kg q8 weeks (6/8/18)  - Budesonide 9mg  - Lialda 4.8g per day  Prior CD surgeries: None  Prior IBD Medications:   - Budesonide    DRUG MONITORING  TPMT enzyme activity: --    6-TGN/6-MMPN levels: --    Biologic concentration:  3/17/17 Inflixumab level 5.1     DISEASE ASSESSMENT  Labs:  Lab Results   Component Value Date    ALBUMIN 4.0 09/01/2017     Recent Labs   Lab Test  06/08/18   1245  05/04/18   0859   CRP  <2.9  <2.9   SED  7  7     Endoscopic assessment: Colonoscopy in 2/2016 with mild chronic active colitis in R colon/cecum and normal histology on remainder of random colon biopsies  Enterography: --  Fecal calprotectin: 6/8/125  C diff: --    ASSESSMENT/PLAN  Emil is a 26-year-old male with history of colonic Crohn's disease, currently in clinical and endoscopic remission on infliximab 5 mg/kg every 8 weeks.      1.  Colonic Crohn's disease. Despite active inflammation, symptoms still more consistent with IBS - particularly as worsened with stress of recent job.  We will treat his inflammation with Remicade at high dose and Lialda.  Discussed empiric trial of prednisone to assess if symptoms are inflammatory or not.  He declined at this time.  Will give some more time on Remicade at this point, but options if continued inflammation include:   - Adding  azathioprine to remicade  - Changing to Entyvio (vedolizumab) with or without azathioprine  - Changing to Stelara (Ustekinumab) with or without azathioprine    He would benefit from meeting with IBD pharmacist to discuss these options.      2: Abdominal pain: Suspect functional. Will treat IBD as above.  Recommend establish with health psychologist. Will trial Bentyl (may need Miralax).  Add in fiber and trial of probiotics.     3.  Constipation.  Resolved at this time. Not using Miralax although previously responded well - will monitor for recurrence.      IBD healthcare maintenance based on patients current medication:  Anti-TNF medication therapy (Inflixumab)    Vaccinations:  -- Influenza (every year): Last given 2017  -- TdaP (every 10 years): Last given 2009  -- Pneumococcal Pneumonia (once then every 5 years): Last given 2015, 2018  -- Yearly assessment for latent Tb (verbal screening and exam, PPD or QuantiFERON-Tb testing): 10/2017    One time confirmation of immunity or serologies:  -- Hepatitis A (serologies or immunizations): Boosters 2018  -- Hepatitis B (serologies or immunizations): Boosters 2018  -- Varicella: Not documented  -- MMR:2004  -- HPV (all aged 18-26): Not documented  -- Meningococcal meningitis (all patients at risk for meningitis): 2007, 2012   -- Due to the immunosuppression in this patient, I would not advise administration of live vaccines such as varicella/VZV, intranasal influenza, MMR, or yellow fever vaccine (if travelling).      Bone mineral density screening   -- Recommend all patients supplement with calcium and vitamin D  -- Given prior steroid use recommend DEXA if not already done    Cancer Screening:  Colon cancer screening:  Colonoscopy every 2-3 years recommended after 8 years of disease.  Dysplasia screening is recommended 2022.    Cervical cancer screening: N/A    Skin cancer screening: Annual visual exam of skin by dermatologist since patient is  immunocompromised    Depression Screening:  -- Over the last month, have you felt down, depressed, or hopeless? sometimes  -- Over the last month, have you felt little interest or pleasure doing things? Yes because of the symptoms    Misc:  -- Avoid tobacco use  -- Avoid NSAIDs as there is potentially a 25% chance of causing an IBD flare      RTC 3 months    Thank you for this consultation.  It was a pleasure to participate in the care of this patient; please contact us with any further questions.      Jim Patten MD    [facility]  Inflammatory Bowel Disease Program  Division of Gastroenterology, Hepatology and Nutrition      HPI:   Main symptoms of concern are bloating and abdominal pain and a sense of food in his stomach that is not going down.  Abdominal pain is on left side. Pain is worse in AM and after eating lunch.  Pain does not seem related to a bowel movement.  At worse pain is a 5/10 - and lasts for a few hours.  Severe pain occurs about 3 times a week on average.     Also has bloating and has tried gas-ex.  He feels that he expels gas, but that there is still more to pass.  Similar sensation with bowel movements - feels that his stools are incomplete.     Having 0-3 stools a day.  Once a week will not have any stool.  Can sometimes go 2-3 days without any stool. Last time that happened was a while ago - if he feels himself getting backed up, then he drinks some Miralax to stimulate a bowel movement.  No blood in the stools. Stools are formed.      Also reports nausea.  No emesis.      He does not think that the budesonide is helping at all.  Possible slight improvement after Lialda    Does have new job with increase in stress. Ongoing stress on job.  Some of the symptoms have increased with job change.     Also noting joint stiffness in left knee.  No redness or swelling.  He is flat footed so that could be a confounder.      Remicade scheduled 8/3/18    ROS:    No  fevers or chills  No weight loss  No blurry vision, double vision or change in vision  No sore throat  No lymphadenopathy  No headache, paraesthesias, or weakness in a limb  No shortness of breath or wheezing  No chest pain or pressure  No arthralgias or myalgias  No rashes or skin changes  No odynophagia or dysphagia  No BRBPR, hematochezia, melena  No dysuria, frequency or urgency  No hot/cold intolerance or polyria  No anxiety or depression    Extra intestinal manifestations of IBD:  No uveitis/episcleritis  No aphthous ulcers   No arthritis   No erythema nodosum/pyoderma gangrenosum.     PERTINENT PAST MEDICAL HISTORY:  Past Medical History:   Diagnosis Date     Cancer (H) 05/05/2018    WILL NOT ALLOW US TO COMPLETE THE  QUESTTIONAIR WITHOUT MARKING YES TO THE DIAGNOSIS TO CANCER     Crohn's colitis (H)        PREVIOUS SURGERIES:  None    PREVIOUS ENDOSCOPY:  Colonoscopy in 2/2016 with mild chronic active colitis in R colon/cecum and normal histology on remainder of random colon biopsies    Colonoscopy 5/23/18:  Moderate right sided colitis. Path with moderate right sided inflammation. Inflammatory polyp at 50cm.     ALLERGIES:     Allergies   Allergen Reactions     Nsaids      Seasonal Allergies        PERTINENT MEDICATIONS:    Current Outpatient Prescriptions:      budesonide (ENTOCORT EC) 3 MG EC capsule, Take 3 capsules (9 mg) by mouth every morning, Disp: 270 capsule, Rfl: 1     inFLIXimab (REMICADE) 100 MG injection, 100 mg IV every 8 weeks, Disp: , Rfl:      mesalamine (LIALDA) 1.2 g EC tablet, Take 4 tablets (4,800 mg) by mouth daily, Disp: 120 tablet, Rfl: 3     dicyclomine (BENTYL) 10 MG capsule, Take of capsule (10mg) two times a day and increase to 4 times a day as needed. (Patient not taking: Reported on 7/10/2018), Disp: 120 capsule, Rfl: 3     DiphenhydrAMINE HCl (BENADRYL PO), Take 50 mg by mouth as needed , Disp: , Rfl:      mesalamine (ASACOL HD) 800 MG EC tablet, Take 3 tablets (2,400 mg) by  "mouth 2 times daily (Patient not taking: Reported on 7/10/2018), Disp: 180 tablet, Rfl: 3     Multiple Vitamins-Minerals (MULTIVITAL PO), Take  by mouth., Disp: , Rfl:     SOCIAL HISTORY:  Social History     Social History     Marital status: Single     Spouse name: N/A     Number of children: N/A     Years of education: N/A     Occupational History     data processing      Social History Main Topics     Smoking status: Never Smoker     Smokeless tobacco: Never Used     Alcohol use 0.6 - 1.2 oz/week     1 - 2 Cans of beer per week      Comment: rarely     Drug use: No     Sexual activity: No     Other Topics Concern     Not on file     Social History Narrative       FAMILY HISTORY:  Family History   Problem Relation Age of Onset     Macular Degeneration Paternal Grandfather      Hypertension Paternal Grandfather      Hypertension Paternal Grandmother      Glaucoma No family hx of        Past/family/social history reviewed and no changes    PHYSICAL EXAMINATION:  Constitutional: aaox3, cooperative, pleasant, not dyspneic/diaphoretic, no acute distress  Vitals reviewed: /62 (BP Location: Left arm, Patient Position: Sitting, Cuff Size: Adult Regular)  Pulse 73  Temp 98.2  F (36.8  C) (Oral)  Resp 16  Ht 1.66 m (5' 5.35\")  Wt 54.5 kg (120 lb 3.2 oz)  SpO2 100%  BMI 19.79 kg/m2  Wt:   Wt Readings from Last 2 Encounters:   07/10/18 54.5 kg (120 lb 3.2 oz)   06/08/18 56.6 kg (124 lb 11.2 oz)      Eyes: Sclera anicteric/injected  Ears/nose/mouth/throat: Normal oropharynx without ulcers or exudate, mucus membranes moist, hearing intact  Neck: supple, thyroid normal size  CV: No edema  Respiratory: Unlabored breathing  Lymph: No axillary, submandibular, supraclavicular or inguinal lymphadenopathy  Abd: Nondistended, +bs, no hepatosplenomegaly, nontender, no peritoneal signs  Skin: warm, perfused, no jaundice  Psych: Normal affect  MSK: Normal gait      PERTINENT STUDIES:  Most recent CBC:  Recent Labs   Lab Test  " 06/08/18   1245  05/04/18   0859   WBC  6.7  5.2   HGB  15.3  16.4   HCT  42.2  46.3   PLT  326  317     Most recent hepatic panel:  Recent Labs   Lab Test  06/08/18   1245  05/18/18   0726   ALT  29  30   AST  16  18     Most recent creatinine:  Recent Labs   Lab Test  04/06/11   2315   CR  0.90            Again, thank you for allowing me to participate in the care of your patient.      Sincerely,    Jim Patten MD

## 2018-07-10 NOTE — PROGRESS NOTES
IBD CLINIC VISIT     CC/REFERRING MD:  Self referred  REASON FOR FOLLOW UP: Crohn's   COLLABORATING PHYSICIAN: Jim Patten MD    IBD HISTORY  Age at diagnosis: 23, 2013  Extent of disease: R colon/cecum  Disease phenotype: inflammation  Cathy-anal disease: None  Current CD medications:   - Infliximab 5mg/kg q8 weeks (started 2014)   - Infliximab 10mg/kg q8 weeks (6/8/18)  - Budesonide 9mg  - Lialda 4.8g per day  Prior CD surgeries: None  Prior IBD Medications:   - Budesonide    DRUG MONITORING  TPMT enzyme activity: --    6-TGN/6-MMPN levels: --    Biologic concentration:  3/17/17 Inflixumab level 5.1     DISEASE ASSESSMENT  Labs:  Lab Results   Component Value Date    ALBUMIN 4.0 09/01/2017     Recent Labs   Lab Test  06/08/18   1245  05/04/18   0859   CRP  <2.9  <2.9   SED  7  7     Endoscopic assessment: Colonoscopy in 2/2016 with mild chronic active colitis in R colon/cecum and normal histology on remainder of random colon biopsies  Enterography: --  Fecal calprotectin: 6/8/125  C diff: --    ASSESSMENT/PLAN  Emil is a 27 year old male with history of colonic Crohn's disease.      1.  Colonic Crohn's disease. Despite active inflammation, symptoms still more consistent with IBS - particularly as worsened with stress of recent job.  We will treat his inflammation with Remicade at high dose and Lialda.  Discussed empiric trial of prednisone to assess if symptoms are inflammatory or not.  He declined at this time.  Will give some more time on Remicade at this point, but options if continued inflammation include:   - Adding azathioprine to remicade  - Changing to Entyvio (vedolizumab) with or without azathioprine  - Changing to Stelara (Ustekinumab) with or without azathioprine    He would benefit from meeting with IBD pharmacist to discuss these options.      2: Abdominal pain: Suspect functional. Will treat IBD as above.  Recommend establish with health psychologist. Will trial Bentyl (may need Miralax).  Add  in fiber and trial of probiotics.     3.  Constipation.  Resolved at this time. Not using Miralax although previously responded well - will monitor for recurrence.      IBD healthcare maintenance based on patients current medication:  Anti-TNF medication therapy (Inflixumab)    Vaccinations:  -- Influenza (every year): Last given 2017  -- TdaP (every 10 years): Last given 2009  -- Pneumococcal Pneumonia (once then every 5 years): Last given 2015, 2018  -- Yearly assessment for latent Tb (verbal screening and exam, PPD or QuantiFERON-Tb testing): 10/2017    One time confirmation of immunity or serologies:  -- Hepatitis A (serologies or immunizations): Boosters 2018  -- Hepatitis B (serologies or immunizations): Boosters 2018  -- Varicella: Not documented  -- MMR:2004  -- HPV (all aged 18-26): Not documented  -- Meningococcal meningitis (all patients at risk for meningitis): 2007, 2012   -- Due to the immunosuppression in this patient, I would not advise administration of live vaccines such as varicella/VZV, intranasal influenza, MMR, or yellow fever vaccine (if travelling).      Bone mineral density screening   -- Recommend all patients supplement with calcium and vitamin D  -- Given prior steroid use recommend DEXA if not already done    Cancer Screening:  Colon cancer screening:  Colonoscopy every 2-3 years recommended after 8 years of disease.  Dysplasia screening is recommended 2022.    Cervical cancer screening: N/A    Skin cancer screening: Annual visual exam of skin by dermatologist since patient is immunocompromised    Depression Screening:  -- Over the last month, have you felt down, depressed, or hopeless? sometimes  -- Over the last month, have you felt little interest or pleasure doing things? Yes because of the symptoms    Misc:  -- Avoid tobacco use  -- Avoid NSAIDs as there is potentially a 25% chance of causing an IBD flare      RTC 3 months    Thank you for this consultation.  It was a pleasure to  participate in the care of this patient; please contact us with any further questions.      Jim Patten MD    [facility]  Inflammatory Bowel Disease Program  Division of Gastroenterology, Hepatology and Nutrition      HPI:   Main symptoms of concern are bloating and abdominal pain and a sense of food in his stomach that is not going down.  Abdominal pain is on left side. Pain is worse in AM and after eating lunch.  Pain does not seem related to a bowel movement.  At worse pain is a 5/10 - and lasts for a few hours.  Severe pain occurs about 3 times a week on average.     Also has bloating and has tried gas-ex.  He feels that he expels gas, but that there is still more to pass.  Similar sensation with bowel movements - feels that his stools are incomplete.     Having 0-3 stools a day.  Once a week will not have any stool.  Can sometimes go 2-3 days without any stool. Last time that happened was a while ago - if he feels himself getting backed up, then he drinks some Miralax to stimulate a bowel movement.  No blood in the stools. Stools are formed.      Also reports nausea.  No emesis.      He does not think that the budesonide is helping at all.  Possible slight improvement after Lialda    Does have new job with increase in stress. Ongoing stress on job.  Some of the symptoms have increased with job change.     Also noting joint stiffness in left knee.  No redness or swelling.  He is flat footed so that could be a confounder.      Remicade scheduled 8/3/18    ROS:    No fevers or chills  No weight loss  No blurry vision, double vision or change in vision  No sore throat  No lymphadenopathy  No headache, paraesthesias, or weakness in a limb  No shortness of breath or wheezing  No chest pain or pressure  No arthralgias or myalgias  No rashes or skin changes  No odynophagia or dysphagia  No BRBPR, hematochezia, melena  No dysuria, frequency or urgency  No hot/cold intolerance or  polyria  No anxiety or depression    Extra intestinal manifestations of IBD:  No uveitis/episcleritis  No aphthous ulcers   No arthritis   No erythema nodosum/pyoderma gangrenosum.     PERTINENT PAST MEDICAL HISTORY:  Past Medical History:   Diagnosis Date     Cancer (H) 05/05/2018    WILL NOT ALLOW US TO COMPLETE THE  QUESTTIONAIR WITHOUT MARKING YES TO THE DIAGNOSIS TO CANCER     Crohn's colitis (H)        PREVIOUS SURGERIES:  None    PREVIOUS ENDOSCOPY:  Colonoscopy in 2/2016 with mild chronic active colitis in R colon/cecum and normal histology on remainder of random colon biopsies    Colonoscopy 5/23/18:  Moderate right sided colitis. Path with moderate right sided inflammation. Inflammatory polyp at 50cm.     ALLERGIES:     Allergies   Allergen Reactions     Nsaids      Seasonal Allergies        PERTINENT MEDICATIONS:    Current Outpatient Prescriptions:      budesonide (ENTOCORT EC) 3 MG EC capsule, Take 3 capsules (9 mg) by mouth every morning, Disp: 270 capsule, Rfl: 1     inFLIXimab (REMICADE) 100 MG injection, 100 mg IV every 8 weeks, Disp: , Rfl:      mesalamine (LIALDA) 1.2 g EC tablet, Take 4 tablets (4,800 mg) by mouth daily, Disp: 120 tablet, Rfl: 3     dicyclomine (BENTYL) 10 MG capsule, Take of capsule (10mg) two times a day and increase to 4 times a day as needed. (Patient not taking: Reported on 7/10/2018), Disp: 120 capsule, Rfl: 3     DiphenhydrAMINE HCl (BENADRYL PO), Take 50 mg by mouth as needed , Disp: , Rfl:      mesalamine (ASACOL HD) 800 MG EC tablet, Take 3 tablets (2,400 mg) by mouth 2 times daily (Patient not taking: Reported on 7/10/2018), Disp: 180 tablet, Rfl: 3     Multiple Vitamins-Minerals (MULTIVITAL PO), Take  by mouth., Disp: , Rfl:     SOCIAL HISTORY:  Social History     Social History     Marital status: Single     Spouse name: N/A     Number of children: N/A     Years of education: N/A     Occupational History     data processing      Social History Main Topics     Smoking  "status: Never Smoker     Smokeless tobacco: Never Used     Alcohol use 0.6 - 1.2 oz/week     1 - 2 Cans of beer per week      Comment: rarely     Drug use: No     Sexual activity: No     Other Topics Concern     Not on file     Social History Narrative       FAMILY HISTORY:  Family History   Problem Relation Age of Onset     Macular Degeneration Paternal Grandfather      Hypertension Paternal Grandfather      Hypertension Paternal Grandmother      Glaucoma No family hx of        Past/family/social history reviewed and no changes    PHYSICAL EXAMINATION:  Constitutional: aaox3, cooperative, pleasant, not dyspneic/diaphoretic, no acute distress  Vitals reviewed: /62 (BP Location: Left arm, Patient Position: Sitting, Cuff Size: Adult Regular)  Pulse 73  Temp 98.2  F (36.8  C) (Oral)  Resp 16  Ht 1.66 m (5' 5.35\")  Wt 54.5 kg (120 lb 3.2 oz)  SpO2 100%  BMI 19.79 kg/m2  Wt:   Wt Readings from Last 2 Encounters:   07/10/18 54.5 kg (120 lb 3.2 oz)   06/08/18 56.6 kg (124 lb 11.2 oz)      Eyes: Sclera anicteric/injected  Ears/nose/mouth/throat: Normal oropharynx without ulcers or exudate, mucus membranes moist, hearing intact  Neck: supple, thyroid normal size  CV: No edema  Respiratory: Unlabored breathing  Lymph: No axillary, submandibular, supraclavicular or inguinal lymphadenopathy  Abd: Nondistended, +bs, no hepatosplenomegaly, nontender, no peritoneal signs  Skin: warm, perfused, no jaundice  Psych: Normal affect  MSK: Normal gait      PERTINENT STUDIES:  Most recent CBC:  Recent Labs   Lab Test  06/08/18   1245  05/04/18   0859   WBC  6.7  5.2   HGB  15.3  16.4   HCT  42.2  46.3   PLT  326  317     Most recent hepatic panel:  Recent Labs   Lab Test  06/08/18   1245  05/18/18   0726   ALT  29  30   AST  16  18     Most recent creatinine:  Recent Labs   Lab Test  04/06/11   2315   CR  0.90       Answers for HPI/ROS submitted by the patient on 6/26/2018   General Symptoms: Yes  Skin Symptoms: No  HENT " Symptoms: Yes  EYE SYMPTOMS: No  HEART SYMPTOMS: No  LUNG SYMPTOMS: No  INTESTINAL SYMPTOMS: Yes  URINARY SYMPTOMS: No  REPRODUCTIVE SYMPTOMS: No  SKELETAL SYMPTOMS: Yes  BLOOD SYMPTOMS: No  NERVOUS SYSTEM SYMPTOMS: No  MENTAL HEALTH SYMPTOMS: No  Fever: No  Loss of appetite: No  Weight loss: Yes  Weight gain: No  Fatigue: Yes  Night sweats: No  Chills: No  Increased stress: No  Excessive hunger: Yes  Excessive thirst: No  Feeling hot or cold when others believe the temperature is normal: No  Loss of height: No  Post-operative complications: No  Surgical site pain: No  Hallucinations: No  Change in or Loss of Energy: Yes  Hyperactivity: No  Confusion: Yes  Ear pain: No  Ear discharge: No  Hearing loss: No  Tinnitus: No  Nosebleeds: No  Congestion: No  Sinus pain: No  Trouble swallowing: No   Voice hoarseness: Yes  Mouth sores: No  Sore throat: No  Tooth pain: No  Gum tenderness: No  Bleeding gums: No  Change in taste: No  Change in sense of smell: No  Dry mouth: No  Hearing aid used: No  Neck lump: No  Heart burn or indigestion: No  Nausea: No  Vomiting: No  Abdominal pain: Yes  Bloating: Yes  Constipation: Yes  Diarrhea: No  Blood in stool: No  Black stools: No  Rectal or Anal pain: Yes  Fecal incontinence: No  Yellowing of skin or eyes: No  Vomit with blood: No  Change in stools: No  Back pain: No  Muscle aches: No  Neck pain: No  Swollen joints: No  Joint pain: No  Bone pain: No  Muscle cramps: No  Muscle weakness: No  Joint stiffness: Yes  Bone fracture: No

## 2018-07-10 NOTE — PATIENT INSTRUCTIONS
It was a pleasure taking care of you today.  I've included a brief summary of our discussion and care plan from today's visit below.  Please review this information with your primary care provider.  _______________________________________________________________________    My recommendations are summarized as follows:    --  Meet with health psychologist to discuss stress reduction techniques.       --  We will check a Remicade level with your next infusion      --  We will get a fecal calprotectin after your next infusion  About one week after infusion     --  If we change your Crohn's medications: options include:   1. Adding azathioprine to remicade  2. Changing to Entyvio (vedolizumab) with or without azathioprine  3. Changing to Stelara (Ustekinumab) with or without azathioprine    --  Trial of Bentyl 10mg twice a day: if increasing constipation, then increase Miralax    --  Add fiber once a day (note: this might increase bloating for first week or 2). Benefiber, metamucil or psyllium.     --  Probiotics: VSL #3, Culturelle, Floraor     Return to GI Clinic in 1-2 months to review your progress.    _______________________________________________________________________    Who do I call with any questions after my visit?  Please be in touch if there are any further questions that arise following today's visit.  There are multiple ways to contact your gastroenterology care team.        During business hours, you may reach a Gastroenterology nurse at [phone].      To schedule or reschedule an appointment, please call [phone].       You can always send a secure message through [facility].  [facility] messages are answered by your nurse or doctor typically within 24 hours.  Please allow extra time on weekends and holidays.        For urgent/emergent questions after business hours, you may reach the on-call GI Fellow by contacting the [facility]  at [phone].     How will I get the results  of any tests ordered?    You will receive all of your results.  If you have signed up for [employer], any tests ordered at your visit will be available to you after your physician reviews them.  Typically this takes 1-2 weeks.  If there are urgent results that require a change in your care plan, your physician or nurse will call you to discuss the next steps.      What is [employer]?  [employer] is a secure way for you to access all of your healthcare records from the [facility].  It is a web based computer program, so you can sign on to it from any location.  It also allows you to send secure messages to your care team.  I recommend signing up for [employer] access if you have not already done so and are comfortable with using a computer.      How to I schedule a follow-up visit?  If you did not schedule a follow-up visit today, please call [phone] to schedule a follow-up office visit.        Sincerely,    Jim Patten MD    [facility]  Division of Gastroenterology       Thanks Vivi Kaet RN Care Coordinator for Dr. Patten  Phone   [phone]

## 2018-07-10 NOTE — MR AVS SNAPSHOT
After Visit Summary   7/10/2018    Emil Garcia    MRN: [id number]           Patient Information     Date Of Birth          1990        Visit Information        Provider Department      7/10/2018 11:40 AM Jim Patten MD [facility] Gastroenterology and IBD [facility]        Care Instructions    It was a pleasure taking care of you today.  I've included a brief summary of our discussion and care plan from today's visit below.  Please review this information with your primary care provider.  _______________________________________________________________________    My recommendations are summarized as follows:    --  Meet with health psychologist to discuss stress reduction techniques.       --  We will check a Remicade level with your next infusion      --  We will get a fecal calprotectin after your next infusion  About one week after infusion     --  If we change your Crohn's medications: options include:   1. Adding azathioprine to remicade  2. Changing to Entyvio (vedolizumab) with or without azathioprine  3. Changing to Stelara (Ustekinumab) with or without azathioprine    --  Trial of Bentyl 10mg twice a day: if increasing constipation, then increase Miralax    --  Add fiber once a day (note: this might increase bloating for first week or 2). Benefiber, metamucil or psyllium.     --  Probiotics: VSL #3, Culturelle, Florastor     Return to GI Clinic in 1-2 months to review your progress.    _______________________________________________________________________    Who do I call with any questions after my visit?  Please be in touch if there are any further questions that arise following today's visit.  There are multiple ways to contact your gastroenterology care team.        During business hours, you may reach a Gastroenterology nurse at [phone].      To schedule or reschedule an appointment, please call [phone].       You can always send a secure message through [employer].   [employer] messages are answered by your nurse or doctor typically within 24 hours.  Please allow extra time on weekends and holidays.        For urgent/emergent questions after business hours, you may reach the on-call GI Fellow by contacting the [facility]  at [phone].     How will I get the results of any tests ordered?    You will receive all of your results.  If you have signed up for [employer], any tests ordered at your visit will be available to you after your physician reviews them.  Typically this takes 1-2 weeks.  If there are urgent results that require a change in your care plan, your physician or nurse will call you to discuss the next steps.      What is [employer]?  [employer] is a secure way for you to access all of your healthcare records from the [facility].  It is a web based computer program, so you can sign on to it from any location.  It also allows you to send secure messages to your care team.  I recommend signing up for [employer] access if you have not already done so and are comfortable with using a computer.      How to I schedule a follow-up visit?  If you did not schedule a follow-up visit today, please call [phone] to schedule a follow-up office visit.        Sincerely,    Jim Patten MD    [facility]  Division of Gastroenterology       Thanks Vivi Kate RN Care Coordinator for Dr. Patten  Phone   [phone]             Follow-ups after your visit        Your next 10 appointments already scheduled     Aug 03, 2018 12:00 PM CDT   Infusion 120 with UC SPEC INFUSION, UC 50 ATC   [facility] ([facility])    [address] [place] [address]  [address] [place] [id number] [phone]            Aug 03, 2018  2:00 PM CDT   Nurse Visit with Uc Pcc Nurse   [facility] ([facility])    [address] [place] [address]  "[facility] [id number]   [phone]            Aug 27, 2018  1:00 PM CDT   (Arrive by 12:45 PM)   New Patient Visit with Elvia Mix PhD   [facility] ([facility])    [address] [place] [id number] [phone]            Sep 25, 2018  3:00 PM CDT   (Arrive by 2:45 PM)   RETURN INFLAMMATORY BOWEL DISEASE with Jim Patten MD   [facility] ([facility])    [address] [place] [id number] [phone]              Who to contact     Please call your clinic at [phone] to:    Ask questions about your health    Make or cancel appointments    Discuss your medicines    Learn about your test results    Speak to your doctor            Additional Information About Your Visit        [employer] Information     [employer] gives you secure access to your electronic health record. If you see a primary care provider, you can also send messages to your care team and make appointments. If you have questions, please call your primary care clinic.  If you do not have a primary care provider, please call [phone] and they will assist you.      [employer] is an electronic gateway that provides easy, online access to your medical records. With [employer], you can request a clinic appointment, read your test results, renew a prescription or communicate with your care team.     To access your existing account, please contact your [facility] or call [phone] for assistance.        Care EveryWhere ID     This is your Care EveryWhere ID. This could be used by other organizations to access your [place] medical records  [id number]        Your Vitals Were     Pulse Temperature Respirations Height Pulse Oximetry BMI (Body Mass Index)    73 98.2  F (36.8  C) (Oral) 16 1.66 m (5' 5.35\") 100% 19.79 kg/m2       Blood Pressure from " Last 3 Encounters:   07/10/18 106/62   06/08/18 102/66   05/23/18 101/68    Weight from Last 3 Encounters:   07/10/18 54.5 kg (120 lb 3.2 oz)   06/08/18 56.6 kg (124 lb 11.2 oz)   05/04/18 60.7 kg (133 lb 12.8 oz)              Today, you had the following     No orders found for display         Today's Medication Changes          These changes are accurate as of 7/10/18 12:41 PM.  If you have any questions, ask your nurse or doctor.               These medicines have changed or have updated prescriptions.        Dose/Directions    mesalamine 1.2 g EC tablet   Commonly known as:  LIALDA   This may have changed:  Another medication with the same name was removed. Continue taking this medication, and follow the directions you see here.   Used for:  Crohn's disease (H)   Changed by:  Jim Patten MD        Dose:  4800 mg   Take 4 tablets (4,800 mg) by mouth daily   Quantity:  120 tablet   Refills:  3                Primary Care Provider Fax #    Physician No Ref-Primary [phone]       No address on file        Equal Access to Services     [facility]: Hadmahogany Pereira, waesther yuan, qamarlee fried, beatris arteaga . So [facility] [phone].    ATENCIÓN: Si habla español, tiene a perkins disposición servicios gratuitos de asistencia lingüística. Llame al [phone].    We comply with applicable federal civil rights laws and [place] laws. We do not discriminate on the basis of race, color, national origin, age, disability, sex, sexual orientation, or gender identity.            Thank you!     Thank you for choosing [facility] GASTRO[facility] IBD CLIN[facility]  for your care. Our goal is always to provide you with excellent care. Hearing back from our patients is one way we can continue to improve our services. Please take a few minutes to complete the written survey that you may receive in the mail after your visit with us. Thank you!             Your Updated  Medication List - Protect others around you: Learn how to safely use, store and throw away your medicines at www.disposemymeds.org.          This list is accurate as of 7/10/18 12:41 PM.  Always use your most recent med list.                   Brand Name Dispense Instructions for use Diagnosis    BENADRYL PO      Take 50 mg by mouth as needed        budesonide 3 MG EC capsule    ENTOCORT EC    270 capsule    Take 3 capsules (9 mg) by mouth every morning    Crohn's disease of large intestine without complication (H)       dicyclomine 10 MG capsule    BENTYL    120 capsule    Take of capsule (10mg) two times a day and increase to 4 times a day as needed.    Crohn's disease (H)       inFLIXimab 100 MG injection    REMICADE     100 mg IV every 8 weeks        mesalamine 1.2 g EC tablet    LIALDA    120 tablet    Take 4 tablets (4,800 mg) by mouth daily    Crohn's disease (H)       MULTIVITAL PO      Take  by mouth.

## 2018-07-10 NOTE — MR AVS SNAPSHOT
After Visit Summary   7/10/2018    Emil Garcia    MRN: [id number]           Patient Information     Date Of Birth          1990        Visit Information        Provider Department      7/10/2018 2:00 PM Karen Wu, [facility] MTM        Today's Diagnoses     Crohn's disease without complication, unspecified gastrointestinal tract location (H)    -  1      Care Instructions    Recommendations from today's MTM visit:                                                    MTM (medication therapy management) is a service provided by a clinical pharmacist designed to help you get the most of out of your medicines.   Today we reviewed what your medicines are for, how to know if they are working, that your medicines are safe and how to make your medicine regimen as easy as possible.     1. Continue current medications and follow-up with Dr. Patten as directed    2. Please reach out if you have further questions    Next MTM visit: 6 months, sooner if needed    To schedule another MTM appointment, please call the clinic directly or you may call the MTM scheduling line at [phone] or toll-free at [phone].     My Clinical Pharmacist's contact information:                                                      It was a pleasure seeing you today!  Please feel free to contact me with any questions or concerns you have.      Karen Wu PharmD   MTM Pharmacist   Phone: [phone]    You may receive a survey about the MTM services you received.  I would appreciate your feedback to help me serve you better in the future. Please fill it out and return it when you can. Your comments will be anonymous.            Follow-ups after your visit        Your next 10 appointments already scheduled     Aug 03, 2018 12:00 PM CDT   Infusion 120 with UC SPEC INFUSION, UC 50 ATC   [facility] ([facility])     [address]  [address]  [place] [address]   [phone]            Aug 03, 2018  2:00 PM CDT   Nurse Visit with [facility] Nurse   [facility] ([facility])    [address]  [address] [facility] [address]   [phone]            Aug 27, 2018  1:00 PM CDT   (Arrive by 12:45 PM)   New Patient Visit with Elvia Mix, PhD   [facility] ([facility])    [address] [place] [address]  [address] [facility] [address]   [phone]            Sep 25, 2018  3:00 PM CDT   (Arrive by 2:45 PM)   RETURN INFLAMMATORY BOWEL DISEASE with Jim Patten MD   [facility] ([facility])    [address] [place] [address]  [address] [facility] [address]   [phone]              Who to contact     If you have questions or need follow up information about today's clinic visit or your schedule please contact [facility] directly at [phone].  Normal or non-critical lab and imaging results will be communicated to you by Gura Gearhart, letter or phone within 4 business days after the clinic has received the results. If you do not hear from us within 7 days, please contact the clinic through Gura Gearhart or phone. If you have a critical or abnormal lab result, we will notify you by phone as soon as possible.  Submit refill requests through [employer] or call your pharmacy and they will forward the refill request to us. Please allow 3 business days for your refill to be completed.          Additional Information About Your Visit        Gura Gearhart Information     [employer] gives you secure access to your electronic health record. If you see a primary care provider, you can also send messages to your care team and make appointments. If you have questions, please call your primary care clinic.  If you do not have a primary care provider, please call  [phone] and they will assist you.        Care EveryWhere ID     This is your Care EveryWhere ID. This could be used by other organizations to access your [place] medical records  [id number]         Blood Pressure from Last 3 Encounters:   07/10/18 106/62   06/08/18 102/66   05/23/18 101/68    Weight from Last 3 Encounters:   07/10/18 120 lb 3.2 oz (54.5 kg)   06/08/18 124 lb 11.2 oz (56.6 kg)   05/04/18 133 lb 12.8 oz (60.7 kg)              Today, you had the following     No orders found for display         Today's Medication Changes          These changes are accurate as of 7/10/18 11:59 PM.  If you have any questions, ask your nurse or doctor.               These medicines have changed or have updated prescriptions.        Dose/Directions    mesalamine 1.2 g EC tablet   Commonly known as:  LIALDA   This may have changed:  Another medication with the same name was removed. Continue taking this medication, and follow the directions you see here.   Used for:  Crohn's disease (H)   Changed by:  Jim Patten MD        Dose:  4800 mg   Take 4 tablets (4,800 mg) by mouth daily   Quantity:  120 tablet   Refills:  3                Primary Care Provider Fax #    Physician No Ref-Primary [phone]       No address on file        Equal Access to Services     HAYLEE ALANIS AH: Hadii dax stacy Sokelly, waaxda luqadaha, qaybta kaalmada adeegyada, beatris callaway. So [facility] [phone].    ATENCIÓN: Si habla español, tiene a perkins disposición servicios gratuitos de asistencia lingüística. Llame al [phone].    We comply with applicable federal civil rights laws and [place] laws. We do not discriminate on the basis of race, color, national origin, age, disability, sex, sexual orientation, or gender identity.            Thank you!     Thank you for choosing [facility]  for your care. Our goal is always to provide you with excellent care. Hearing back  from our patients is one way we can continue to improve our services. Please take a few minutes to complete the written survey that you may receive in the mail after your visit with us. Thank you!             Your Updated Medication List - Protect others around you: Learn how to safely use, store and throw away your medicines at www.disposemymeds.org.          This list is accurate as of 7/10/18 11:59 PM.  Always use your most recent med list.                   Brand Name Dispense Instructions for use Diagnosis    BENADRYL PO      Take 50 mg by mouth as needed        budesonide 3 MG EC capsule    ENTOCORT EC    270 capsule    Take 3 capsules (9 mg) by mouth every morning    Crohn's disease of large intestine without complication (H)       dicyclomine 10 MG capsule    BENTYL    120 capsule    Take of capsule (10mg) two times a day and increase to 4 times a day as needed.    Crohn's disease (H)       inFLIXimab 100 MG injection    REMICADE     100 mg IV every 8 weeks        mesalamine 1.2 g EC tablet    LIALDA    120 tablet    Take 4 tablets (4,800 mg) by mouth daily    Crohn's disease (H)       MULTIVITAL PO      Take  by mouth.

## 2018-07-11 NOTE — PATIENT INSTRUCTIONS
Recommendations from today's MTM visit:                                                    MTM (medication therapy management) is a service provided by a clinical pharmacist designed to help you get the most of out of your medicines.   Today we reviewed what your medicines are for, how to know if they are working, that your medicines are safe and how to make your medicine regimen as easy as possible.     1. Continue current medications and follow-up with Dr. Patten as directed    2. Please reach out if you have further questions    Next MTM visit: 6 months, sooner if needed    To schedule another MTM appointment, please call the clinic directly or you may call the MTM scheduling line at [phone] or toll-free at [phone].     My Clinical Pharmacist's contact information:                                                      It was a pleasure seeing you today!  Please feel free to contact me with any questions or concerns you have.      Karen Wu PharmD   MTM Pharmacist   Phone: [phone]    You may receive a survey about the MTM services you received.  I would appreciate your feedback to help me serve you better in the future. Please fill it out and return it when you can. Your comments will be anonymous.

## 2018-07-31 ENCOUNTER — CARE COORDINATION (OUTPATIENT)
Dept: GASTROENTEROLOGY | Facility: CLINIC | Age: 28
End: 2018-07-31

## 2018-07-31 DIAGNOSIS — K50.90 CROHN'S DISEASE (H): Primary | ICD-10-CM

## 2018-08-01 NOTE — PROGRESS NOTES
Per my chart request    Absolutely.             Previous Messages       ----- Message -----      From: Vivi Kate RN      Sent: 7/27/2018   7:46 AM        To: Jim Patten MD   Subject: my chart request for referral to [facility] Vivi,     I'd like to get a second opinion at this point, as my symptoms haven't changed.  Is it possible to get a referral to the [facility] from Dr. Patten?     Thanks,     Sen

## 2018-08-03 ENCOUNTER — CARE COORDINATION (OUTPATIENT)
Dept: GASTROENTEROLOGY | Facility: CLINIC | Age: 28
End: 2018-08-03

## 2018-08-03 ENCOUNTER — ALLIED HEALTH/NURSE VISIT (OUTPATIENT)
Dept: INTERNAL MEDICINE | Facility: CLINIC | Age: 28
End: 2018-08-03
Payer: COMMERCIAL

## 2018-08-03 ENCOUNTER — INFUSION THERAPY VISIT (OUTPATIENT)
Dept: INFUSION THERAPY | Facility: CLINIC | Age: 28
End: 2018-08-03
Attending: INTERNAL MEDICINE
Payer: COMMERCIAL

## 2018-08-03 VITALS
WEIGHT: 118.2 LBS | SYSTOLIC BLOOD PRESSURE: 112 MMHG | RESPIRATION RATE: 16 BRPM | TEMPERATURE: 97.5 F | HEART RATE: 94 BPM | DIASTOLIC BLOOD PRESSURE: 73 MMHG | BODY MASS INDEX: 19.46 KG/M2

## 2018-08-03 DIAGNOSIS — K50.90 CROHN'S DISEASE WITHOUT COMPLICATION, UNSPECIFIED GASTROINTESTINAL TRACT LOCATION (H): Primary | ICD-10-CM

## 2018-08-03 DIAGNOSIS — K50.90 CROHN'S DISEASE (H): Primary | ICD-10-CM

## 2018-08-03 DIAGNOSIS — K50.10 CROHN'S DISEASE OF LARGE INTESTINE WITHOUT COMPLICATION (H): ICD-10-CM

## 2018-08-03 DIAGNOSIS — Z23 NEED FOR PROPHYLACTIC VACCINATION AGAINST HEPATITIS A AND HEPATITIS B: Primary | ICD-10-CM

## 2018-08-03 PROCEDURE — 96413 CHEMO IV INFUSION 1 HR: CPT

## 2018-08-03 PROCEDURE — 96415 CHEMO IV INFUSION ADDL HR: CPT

## 2018-08-03 PROCEDURE — 25000128 H RX IP 250 OP 636: Mod: ZF | Performed by: INTERNAL MEDICINE

## 2018-08-03 RX ORDER — DIPHENHYDRAMINE HCL 25 MG
25 CAPSULE ORAL ONCE
Status: CANCELLED
Start: 2018-08-03 | End: 2018-08-03

## 2018-08-03 RX ORDER — DIPHENHYDRAMINE HCL 25 MG
25 CAPSULE ORAL ONCE
Status: DISCONTINUED | OUTPATIENT
Start: 2018-08-03 | End: 2018-08-03 | Stop reason: CLARIF

## 2018-08-03 RX ORDER — ACETAMINOPHEN 325 MG/1
650 TABLET ORAL ONCE
Status: DISCONTINUED | OUTPATIENT
Start: 2018-08-03 | End: 2018-08-03 | Stop reason: CLARIF

## 2018-08-03 RX ORDER — METHYLPREDNISOLONE SODIUM SUCCINATE 125 MG/2ML
125 INJECTION, POWDER, LYOPHILIZED, FOR SOLUTION INTRAMUSCULAR; INTRAVENOUS ONCE
Status: DISCONTINUED | OUTPATIENT
Start: 2018-08-03 | End: 2018-08-03 | Stop reason: CLARIF

## 2018-08-03 RX ORDER — ACETAMINOPHEN 325 MG/1
650 TABLET ORAL ONCE
Status: CANCELLED
Start: 2018-08-03 | End: 2018-08-03

## 2018-08-03 RX ORDER — METHYLPREDNISOLONE SODIUM SUCCINATE 125 MG/2ML
125 INJECTION, POWDER, LYOPHILIZED, FOR SOLUTION INTRAMUSCULAR; INTRAVENOUS ONCE
Status: CANCELLED | OUTPATIENT
Start: 2018-08-03 | End: 2018-08-03

## 2018-08-03 RX ADMIN — INFLIXIMAB 500 MG: 100 INJECTION, POWDER, LYOPHILIZED, FOR SOLUTION INTRAVENOUS at 12:59

## 2018-08-03 RX ADMIN — SODIUM CHLORIDE 50 ML: 9 INJECTION, SOLUTION INTRAVENOUS at 13:00

## 2018-08-03 NOTE — PATIENT INSTRUCTIONS
Dear Emil Garcia    Thank you for choosing [facility] ([facility]) for your infusion.  The following information is a summary of our appointment as well as important reminders.      Additional information: Your infusion today of inFLIXimab (REMICADE) 500 mg in sodium chloride 0.9 % 325 mL infusion.    We look forward in seeing you on your next appointment here at [facility].  Please don t hesitate to call us at [phone] to reschedule any of your appointments or to speak with one of the [facility] registered nurses.  It was a pleasure taking care of you today.    Sincerely,    [facility]  [facility]  [address] [place]  [address]  Phone:  [phone]    Infliximab Solution for injection  What is this medicine?  INFLIXIMAB (in FLIX i mab) is used to treat Crohn's disease and ulcerative colitis. It is also used to treat ankylosing spondylitis, psoriasis, and some forms of arthritis.  This medicine may be used for other purposes; ask your health care provider or pharmacist if you have questions.  What should I tell my health care provider before I take this medicine?  They need to know if you have any of these conditions:    diabetes    exposure to tuberculosis    heart failure    hepatitis or liver disease    immune system problems    infection    lung or breathing disease, like COPD    multiple sclerosis    current or past resident of [place]    seizure disorder    an unusual or allergic reaction to infliximab, mouse proteins, other medicines, foods, dyes, or preservatives    pregnant or trying to get pregnant    breast-feeding  How should I use this medicine?  This medicine is for injection into a vein. It is usually given by a health care professional in a hospital or clinic setting.  A special MedGuide will be given to you by the pharmacist with each prescription and  refill. Be sure to read this information carefully each time.  Talk to your pediatrician regarding the use of this medicine in children. Special care may be needed.  Overdosage: If you think you have taken too much of this medicine contact a poison control center or emergency room at once.  NOTE: This medicine is only for you. Do not share this medicine with others.  What if I miss a dose?  It is important not to miss your dose. Call your doctor or health care professional if you are unable to keep an appointment.  What may interact with this medicine?  Do not take this medicine with any of the following medications:    anakinra    rilonacept  This medicine may also interact with the following medications:    vaccines  This list may not describe all possible interactions. Give your health care provider a list of all the medicines, herbs, non-prescription drugs, or dietary supplements you use. Also tell them if you smoke, drink alcohol, or use illegal drugs. Some items may interact with your medicine.  What should I watch for while using this medicine?  Visit your doctor or health care professional for regular checks on your progress.  If you get a cold or other infection while receiving this medicine, call your doctor or health care professional. Do not treat yourself. This medicine may decrease your body's ability to fight infections. Before beginning therapy, your doctor may do a test to see if you have been exposed to tuberculosis.  This medicine may make the symptoms of heart failure worse in some patients. If you notice symptoms such as increased shortness of breath or swelling of the ankles or legs, contact your health care provider right away.  If you are going to have surgery or dental work, tell your health care professional or dentist that you have received this medicine.  If you take this medicine for plaque psoriasis, stay out of the sun. If you cannot avoid being in the sun, wear protective clothing and  use sunscreen. Do not use sun lamps or tanning beds/booths.  What side effects may I notice from receiving this medicine?  Side effects that you should report to your doctor or health care professional as soon as possible:    allergic reactions like skin rash, itching or hives, swelling of the face, lips, or tongue    chest pain    fever or chills, usually related to the infusion    muscle or joint pain    red, scaly patches or raised bumps on the skin    signs of infection - fever or chills, cough, sore throat, pain or difficulty passing urine    swollen lymph nodes in the neck, underarm, or groin areas    unexplained weight loss    unusual bleeding or bruising    unusually weak or tired    yellowing of the eyes or skin  Side effects that usually do not require medical attention (report to your doctor or health care professional if they continue or are bothersome):    headache    heartburn or stomach pain    nausea, vomiting  This list may not describe all possible side effects. Call your doctor for medical advice about side effects. You may report side effects to FDA at 6-557-FDA-2999.  Where should I keep my medicine?  This drug is given in a hospital or clinic and will not be stored at home.  NOTE:This sheet is a summary. It may not cover all possible information. If you have questions about this medicine, talk to your doctor, pharmacist, or health care provider. Copyright  2016 Gold Standard

## 2018-08-03 NOTE — MR AVS SNAPSHOT
After Visit Summary   8/3/2018    Emil Garcia    MRN: [id number]           Patient Information     Date Of Birth          1990        Visit Information        Provider Department      8/3/2018 2:00 PM Nurse, Gerardo Pcc [facility]        Today's Diagnoses     Need for prophylactic vaccination against hepatitis A and hepatitis B    -  1       Follow-ups after your visit        Your next 10 appointments already scheduled     Aug 27, 2018  1:00 PM CDT   (Arrive by 12:45 PM)   New Patient Visit with Elvia Mix PhD   [facility] ([facility])    [address] [place] [address] [phone]            Sep 25, 2018  3:00 PM CDT   (Arrive by 2:45 PM)   RETURN INFLAMMATORY BOWEL DISEASE with Jim Patten MD   [facility] ([facility])    [address] [place] [address] [phone]              Future tests that were ordered for you today     Open Standing Orders        Priority Remaining Interval Expires Ordered    Notify Physician Routine [id number] PRN  8/3/2018            Who to contact     Please call your clinic at [phone] to:    Ask questions about your health    Make or cancel appointments    Discuss your medicines    Learn about your test results    Speak to your doctor            Additional Information About Your Visit        [employer] Information     [employer] gives you secure access to your electronic health record. If you see a primary care provider, you can also send messages to your care team and make appointments. If you have questions, please call your primary care clinic.  If you do not have a primary care provider, please call [phone] and they will assist you.      [employer]iku is an electronic gateway that provides easy, online access to your medical records. With [employer], you can request a  clinic appointment, read your test results, renew a prescription or communicate with your care team.     To access your existing account, please contact your [facility] or call [phone] for assistance.        Care EveryWhere ID     This is your Care EveryWhere ID. This could be used by other organizations to access your [place] medical records  [id number]         Blood Pressure from Last 3 Encounters:   08/03/18 110/73   07/10/18 106/62   06/08/18 102/66    Weight from Last 3 Encounters:   08/03/18 53.6 kg (118 lb 3.2 oz)   07/10/18 54.5 kg (120 lb 3.2 oz)   06/08/18 56.6 kg (124 lb 11.2 oz)              We Performed the Following     HEPATITIS A AND B VACCINE (TWINRIX), ADULT        Primary Care Provider Fax #    Physician No Ref-Primary [phone]       No address on file        Equal Access to Services     [facility] : Hadii dax Pereira, waesther yuan, batsheva randolphalmachi fried, beatris arteaga . So [facility] [phone].    ATENCIÓN: Si habla español, tiene a perkins disposición servicios gratuitos de asistencia lingüística. Richard al [phone].    We comply with applicable federal civil rights laws and [place] laws. We do not discriminate on the basis of race, color, national origin, age, disability, sex, sexual orientation, or gender identity.            Thank you!     Thank you for choosing [facility]  for your care. Our goal is always to provide you with excellent care. Hearing back from our patients is one way we can continue to improve our services. Please take a few minutes to complete the written survey that you may receive in the mail after your visit with us. Thank you!             Your Updated Medication List - Protect others around you: Learn how to safely use, store and throw away your medicines at www.disposemymeds.org.          This list is accurate as of 8/3/18  2:27 PM.  Always use your most recent med  list.                   Brand Name Dispense Instructions for use Diagnosis    BENADRYL PO      Take 50 mg by mouth as needed        budesonide 3 MG EC capsule    ENTOCORT EC    270 capsule    Take 3 capsules (9 mg) by mouth every morning    Crohn's disease of large intestine without complication (H)       dicyclomine 10 MG capsule    BENTYL    120 capsule    Take of capsule (10mg) two times a day and increase to 4 times a day as needed.    Crohn's disease (H)       inFLIXimab 100 MG injection    REMICADE     100 mg IV every 8 weeks        mesalamine 1.2 g EC tablet    LIALDA    120 tablet    Take 4 tablets (4,800 mg) by mouth daily    Crohn's disease (H)       MULTIVITAL PO      Take  by mouth.

## 2018-08-03 NOTE — NURSING NOTE
Chief Complaint   Patient presents with     Imm/Inj     Patient is here for 3rd dose of Twinrix vaccine       Emil Garcia comes into clinic today at the request of Dr. Ariel Medrano for Twinrix vaccine.    Administered 3rd dose of Twinrix vaccine (see Immunizations in Chart Review). Patient tolerated well.        This service provided today was under the direct supervision of Dr. Trino Bryson, who was available if needed.      Ubaldo Garcia CMA at 2:20 PM on 8/3/2018

## 2018-08-03 NOTE — PROGRESS NOTES
Nursing Note  [facility]il [facility]:   Chief Complaint   Patient presents with     Infusion     Remicade (infliximab)     During today's [facility] appointment, orders from Dr. Patten were completed.  Frequency: every 8 weeks    Progress note:  Patient identification verified by name and date of birth.  Assessment completed.  Vitals recorded in Doc Flowsheets.  Patient was provided with education regarding infusion and possible side effects.  Patient verbalized understanding.      needed: No  Premedications: declined due to patient preference, patient has history or reaction but prefers to receive the medication over 2 hours, instead of taking pre-medications..  Infusion Rates: 163 ml/hr.  Approximate Infusion length:3 hours.   Labs: were drawn per orders, every other month labs not due today, infliximab level was ordered for today..   Vascular access: peripheral IV placed today.  Treatment Conditions: patient denies fever, chills, signs of infection, recent illness, on antibiotics, productive cough or elevated temperature.  Patient tolerated infusion: well.    Drug Waste Record? No     Discharge Plan:   Follow up plan of care with: ongoing infusions [facility].  Discharge instructions were reviewed with patient.  Patient/representative verbalized understanding of discharge instructions and all questions answered.  Patient discharged from [facility] in stable condition.    Karlene Cabral, KYAW    Administrations This Visit     0.9% sodium chloride BOLUS     Admin Date Action Dose Route Administered By             08/03/2018 New Bag 50 mL Intravenous Karlene Cabral, RN                    inFLIXimab (REMICADE) 500 mg in sodium chloride 0.9 % 325 mL infusion     Admin Date Action Dose Rate Route Administered By          08/03/2018 New Bag 500 mg 162.5 mL/hr  "Intravenous Karlene Cabral RN                         /73  Pulse 99  Temp 97.5  F (36.4  C) (Oral)  Resp 16  Wt 53.6 kg (118 lb 3.2 oz)  BMI 19.46 kg/m2      PRIOR TO INFUSION OF BIOLOGICAL MEDICATIONS OR ANY OF THESE AS LISTED: Remicaide (infliximab) \".rheumbiologicalchecklist\"    Prior to Infusion of biological medications or any of these as listed:    1. Elevated temperature, fever, chills, productive cough or abnormal vital signs, night sweats, coughing up blood or sputum, no appetite or abnormal vital signs : NO    2. Open wounds or new incisions: NO    3. Recent hospitalization: NO    4.  Recent surgeries:  NO    5. Any upcoming surgeries or dental procedures?:NO    6. Any current or recent bouts of illness or infection? On any antibiotics? : NO    7. Any new, sudden or worsening abdominal pain :NO    8. Vaccination within 4 weeks? Patient or someone in the household is scheduled to receive vaccination? No live virus vaccines prior to or during treatment :NO    9. Any nervous system diseases [i.e. multiple sclerosis, Guillain-[place], seizures, neurological  changes]: NO    10. Pregnant or breast feeding; or plans on pregnancy in the future: NO    11. Signs of worsening depression or suicidal ideations while taking benlysta:NO    12. New-onset medical symptoms: NO    13.  New cancer diagnosis or on chemotherapy or radiation NO    14.  Evaluate for any sign of active TB [Unexplained weight loss, Loss of appetite, Night sweats, Fever, Fatigue, Chills, Coughing for 3 weeks or longer, Hemoptysis (coughing up blood), Chest pain]: NO    **Note: If answered yes to any of the above, hold the infusion and contact ordering rheumatologist or on-call rheumatologist.   "

## 2018-08-03 NOTE — MR AVS SNAPSHOT
After Visit Summary   8/3/2018    Emil Garcia    MRN: [id number]           Patient Information     Date Of Birth          1990        Visit Information        Provider Department      8/3/2018 12:00 PM UC 50 ATC; UC SPEC INFUSION [facility] Special[facility] Procedure        Today's Diagnoses     Crohn's disease without complication, unspecified gastrointestinal tract location (H)    -  1    Crohn's disease of large intestine without complication (H)          Care Instructions    Dear Emil Garcia    Thank you for choosing [facility] ([facility]) for your infusion.  The following information is a summary of our appointment as well as important reminders.      Additional information: Your infusion today of inFLIXimab (REMICADE) 500 mg in sodium chloride 0.9 % 325 mL infusion     We look forward in seeing you on your next appointment here at [facility].  Please don t hesitate to call us at [phone] to reschedule any of your appointments or to speak with one of the [facility] registered nurses.  It was a pleasure taking care of you today.    Sincerely,    [facility]  Specialty [facility]fusion [facility] Center  [address] [place] [address]  [id number]  Phone:  [phone]    Infliximab Solution for injection  What is this medicine?  INFLIXIMAB (in FLIX i mab) is used to treat Crohn's disease and ulcerative colitis. It is also used to treat ankylosing spondylitis, psoriasis, and some forms of arthritis.  This medicine may be used for other purposes; ask your health care provider or pharmacist if you have questions.  What should I tell my health care provider before I take this medicine?  They need to know if you have any of these conditions:    diabetes    exposure to tuberculosis    heart failure    hepatitis or liver disease    immune system problems    infection    lung or breathing disease, like  COPD    multiple sclerosis    current or past resident of [place]    seizure disorder    an unusual or allergic reaction to infliximab, mouse proteins, other medicines, foods, dyes, or preservatives    pregnant or trying to get pregnant    breast-feeding  How should I use this medicine?  This medicine is for injection into a vein. It is usually given by a health care professional in a hospital or clinic setting.  A special MedGuide will be given to you by the pharmacist with each prescription and refill. Be sure to read this information carefully each time.  Talk to your pediatrician regarding the use of this medicine in children. Special care may be needed.  Overdosage: If you think you have taken too much of this medicine contact a poison control center or emergency room at once.  NOTE: This medicine is only for you. Do not share this medicine with others.  What if I miss a dose?  It is important not to miss your dose. Call your doctor or health care professional if you are unable to keep an appointment.  What may interact with this medicine?  Do not take this medicine with any of the following medications:    anakinra    rilonacept  This medicine may also interact with the following medications:    vaccines  This list may not describe all possible interactions. Give your health care provider a list of all the medicines, herbs, non-prescription drugs, or dietary supplements you use. Also tell them if you smoke, drink alcohol, or use illegal drugs. Some items may interact with your medicine.  What should I watch for while using this medicine?  Visit your doctor or health care professional for regular checks on your progress.  If you get a cold or other infection while receiving this medicine, call your doctor or health care professional. Do not treat yourself. This medicine may decrease your body's ability to fight infections. Before beginning therapy, your doctor may do a test to see if  you have been exposed to tuberculosis.  This medicine may make the symptoms of heart failure worse in some patients. If you notice symptoms such as increased shortness of breath or swelling of the ankles or legs, contact your health care provider right away.  If you are going to have surgery or dental work, tell your health care professional or dentist that you have received this medicine.  If you take this medicine for plaque psoriasis, stay out of the sun. If you cannot avoid being in the sun, wear protective clothing and use sunscreen. Do not use sun lamps or tanning beds/booths.  What side effects may I notice from receiving this medicine?  Side effects that you should report to your doctor or health care professional as soon as possible:    allergic reactions like skin rash, itching or hives, swelling of the face, lips, or tongue    chest pain    fever or chills, usually related to the infusion    muscle or joint pain    red, scaly patches or raised bumps on the skin    signs of infection - fever or chills, cough, sore throat, pain or difficulty passing urine    swollen lymph nodes in the neck, underarm, or groin areas    unexplained weight loss    unusual bleeding or bruising    unusually weak or tired    yellowing of the eyes or skin  Side effects that usually do not require medical attention (report to your doctor or health care professional if they continue or are bothersome):    headache    heartburn or stomach pain    nausea, vomiting  This list may not describe all possible side effects. Call your doctor for medical advice about side effects. You may report side effects to FDA at 6-435-FDA-3099.  Where should I keep my medicine?  This drug is given in a hospital or clinic and will not be stored at home.  NOTE:This sheet is a summary. It may not cover all possible information. If you have questions about this medicine, talk to your doctor, pharmacist, or health care provider. Copyright  2016 [employer]                  Follow-ups after your visit        Your next 10 appointments already scheduled     Aug 27, 2018  1:00 PM CDT   (Arrive by 12:45 PM)   New Patient Visit with Elvia Mix, PhD   [facility] ([facility])    [address] [place] [id number] [phone]            Sep 25, 2018  3:00 PM CDT   (Arrive by 2:45 PM)   RETURN INFLAMMATORY BOWEL DISEASE with Jim Patten MD   [facility] ([facility])    [address] [place] [id number] [phone]              Future tests that were ordered for you today     Open Standing Orders        Priority Remaining Interval Expires Ordered    Notify Physician Routine [id number] PRN  8/3/2018            Who to contact     If you have questions or need follow up information about today's clinic visit or your schedule please contact [facility] SPECIALTY AND PROCEDURE directly at [phone].  Normal or non-critical lab and imaging results will be communicated to you by [facility], letter or phone within 4 business days after the clinic has received the results. If you do not hear from us within 7 days, please contact the clinic through [facility] or phone. If you have a critical or abnormal lab result, we will notify you by phone as soon as possible.  Submit refill requests through [employer] or call your pharmacy and they will forward the refill request to us. Please allow 3 business days for your refill to be completed.          Additional Information About Your Visit        [facility] Information     [employer] gives you secure access to your electronic health record. If you see a primary care provider, you can also send messages to your care team and make appointments. If you have questions, please call your primary care clinic.  If you do not have a primary care provider,  please call [phone] and they will assist you.        Care EveryWhere ID     This is your Care EveryWhere ID. This could be used by other organizations to access your [place] medical records  [id number]        Your Vitals Were     Pulse Temperature Respirations BMI (Body Mass Index)          99 97.5  F (36.4  C) (Oral) 16 19.46 kg/m2         Blood Pressure from Last 3 Encounters:   08/03/18 110/73   07/10/18 106/62   06/08/18 102/66    Weight from Last 3 Encounters:   08/03/18 53.6 kg (118 lb 3.2 oz)   07/10/18 54.5 kg (120 lb 3.2 oz)   06/08/18 56.6 kg (124 lb 11.2 oz)              We Performed the Following     Infliximab level        Primary Care Provider Fax #    Physician No Ref-Primary [phone]       No address on file        Equal Access to Services     [facility] : Hadii dax Pereira, cecil yuan, batsheva randolphalpattie fried, beatris arteaga . So [facility] [phone].    ATENCIÓN: Si habla español, tiene a perkins disposición servicios gratuitos de asistencia lingüística. Stephanieame al [phone].    We comply with applicable federal civil rights laws and [place] laws. We do not discriminate on the basis of race, color, national origin, age, disability, sex, sexual orientation, or gender identity.            Thank you!     Thank you for choosing [facility] [place] [facility] PROCEDURE  for your care. Our goal is always to provide you with excellent care. Hearing back from our patients is one way we can continue to improve our services. Please take a few minutes to complete the written survey that you may receive in the mail after your visit with us. Thank you!             Your Updated Medication List - Protect others around you: Learn how to safely use, store and throw away your medicines at www.disposemymeds.org.          This list is accurate as of 8/3/18  2:21 PM.  Always use your most recent med list.                   Brand Name Dispense  Instructions for use Diagnosis    BENADRYL PO      Take 50 mg by mouth as needed        budesonide 3 MG EC capsule    ENTOCORT EC    270 capsule    Take 3 capsules (9 mg) by mouth every morning    Crohn's disease of large intestine without complication (H)       dicyclomine 10 MG capsule    BENTYL    120 capsule    Take of capsule (10mg) two times a day and increase to 4 times a day as needed.    Crohn's disease (H)       inFLIXimab 100 MG injection    REMICADE     100 mg IV every 8 weeks        mesalamine 1.2 g EC tablet    LIALDA    120 tablet    Take 4 tablets (4,800 mg) by mouth daily    Crohn's disease (H)       MULTIVITAL PO      Take  by mouth.

## 2018-08-07 ENCOUNTER — CARE COORDINATION (OUTPATIENT)
Dept: GASTROENTEROLOGY | Facility: CLINIC | Age: 28
End: 2018-08-07

## 2018-08-07 DIAGNOSIS — K50.90 CROHN'S DISEASE (H): Primary | ICD-10-CM

## 2018-08-08 ENCOUNTER — MYC MEDICAL ADVICE (OUTPATIENT)
Dept: GASTROENTEROLOGY | Facility: CLINIC | Age: 28
End: 2018-08-08

## 2018-08-08 LAB — LAB SCANNED RESULT: NORMAL

## 2018-08-10 ENCOUNTER — CARE COORDINATION (OUTPATIENT)
Dept: GASTROENTEROLOGY | Facility: CLINIC | Age: 28
End: 2018-08-10

## 2018-08-10 DIAGNOSIS — K50.90 CROHN'S DISEASE (H): Primary | ICD-10-CM

## 2018-08-10 NOTE — PROGRESS NOTES
Edited therapy to change interval to every 4 weeks. Message to infusion specialist to work on interval change.          IFX level still under 10...     Can we do 10mg/kg every 4 weeks?

## 2018-08-10 NOTE — PROGRESS NOTES
Called pt to discuss change in interval to every 4 weeks. To go ahead and schedule next infusion around August 31. That probably will need to prior authorize.  That level was low and goal around 10.  That mucus in stool is normal. That will not change the plan if fecal calprotectin is elevated.

## 2018-08-14 LAB — CALPROTECTIN STL-MCNT: <27 MG/KG (ref 0–49.9)

## 2018-08-15 ENCOUNTER — TELEPHONE (OUTPATIENT)
Dept: GASTROENTEROLOGY | Facility: CLINIC | Age: 28
End: 2018-08-15

## 2018-08-15 NOTE — TELEPHONE ENCOUNTER
[facility] Call Center    Phone Message    May a detailed message be left on voicemail: yes    Reason for Call: Other: The pt states his dose of Remicade has been increased, but the test results have been the same, so should he be taking the increased amount? Please call the pt. Thanks.     Action Taken: Message routed to:  [facility] ([facility] gi

## 2018-08-15 NOTE — TELEPHONE ENCOUNTER
[facility] Call Center    Phone Message    May a detailed message be left on voicemail: yes    Reason for Call: patient called in wanting to reschedule w/ Elvia doyle    Action Taken: Message routed to:  [facility] ([facility]): ROSI

## 2018-08-15 NOTE — TELEPHONE ENCOUNTER
Called pt to discuss medications  Plan to continue on lialda  Remicade at the every 8  Weeks with 10mg/kg   Continue entocort  Suggested possible nutrition referral with our nutritionist that works with ibd. Also component IBs  Pt has [facility] appt on August 29  Requested pt have office of notes sent to our clinic  Pt has appt with Dr. Mix  Talked about ibs and that overall he is doing well  Labs are good  Pt is fatigued but has started new job and lot of stress.

## 2018-08-24 ENCOUNTER — MYC MEDICAL ADVICE (OUTPATIENT)
Dept: GASTROENTEROLOGY | Facility: CLINIC | Age: 28
End: 2018-08-24

## 2018-08-29 ENCOUNTER — CARE COORDINATION (OUTPATIENT)
Dept: GASTROENTEROLOGY | Facility: CLINIC | Age: 28
End: 2018-08-29

## 2018-08-29 NOTE — PROGRESS NOTES
We received notification that Remicade 10mg/kg every 4 weeks was approved by the patient's insurance plan.     The approval was only for 1 month, but we have re-submitted a renewal request and will let you know if we encounter further challenges.

## 2018-08-31 ENCOUNTER — CARE COORDINATION (OUTPATIENT)
Dept: GASTROENTEROLOGY | Facility: CLINIC | Age: 28
End: 2018-08-31

## 2018-08-31 ENCOUNTER — INFUSION THERAPY VISIT (OUTPATIENT)
Dept: INFUSION THERAPY | Facility: CLINIC | Age: 28
End: 2018-08-31
Attending: INTERNAL MEDICINE
Payer: COMMERCIAL

## 2018-08-31 VITALS
DIASTOLIC BLOOD PRESSURE: 67 MMHG | SYSTOLIC BLOOD PRESSURE: 100 MMHG | HEIGHT: 65 IN | OXYGEN SATURATION: 99 % | HEART RATE: 75 BPM | BODY MASS INDEX: 19.06 KG/M2 | RESPIRATION RATE: 16 BRPM | WEIGHT: 114.4 LBS | TEMPERATURE: 98.5 F

## 2018-08-31 DIAGNOSIS — K50.90 CROHN'S DISEASE WITHOUT COMPLICATION, UNSPECIFIED GASTROINTESTINAL TRACT LOCATION (H): Primary | ICD-10-CM

## 2018-08-31 LAB
ALBUMIN SERPL-MCNC: 3.9 G/DL (ref 3.4–5)
ALP SERPL-CCNC: 58 U/L (ref 40–150)
ALT SERPL W P-5'-P-CCNC: 17 U/L (ref 0–70)
AST SERPL W P-5'-P-CCNC: 10 U/L (ref 0–45)
BASOPHILS # BLD AUTO: 0 10E9/L (ref 0–0.2)
BASOPHILS NFR BLD AUTO: 0.1 %
BILIRUB DIRECT SERPL-MCNC: 0.4 MG/DL (ref 0–0.2)
BILIRUB SERPL-MCNC: 1.6 MG/DL (ref 0.2–1.3)
CRP SERPL-MCNC: <2.9 MG/L (ref 0–8)
DIFFERENTIAL METHOD BLD: ABNORMAL
EOSINOPHIL # BLD AUTO: 0 10E9/L (ref 0–0.7)
EOSINOPHIL NFR BLD AUTO: 0.3 %
ERYTHROCYTE [DISTWIDTH] IN BLOOD BY AUTOMATED COUNT: 12 % (ref 10–15)
ERYTHROCYTE [SEDIMENTATION RATE] IN BLOOD BY WESTERGREN METHOD: 6 MM/H (ref 0–15)
HCT VFR BLD AUTO: 44 % (ref 40–53)
HGB BLD-MCNC: 15.9 G/DL (ref 13.3–17.7)
IMM GRANULOCYTES # BLD: 0 10E9/L (ref 0–0.4)
IMM GRANULOCYTES NFR BLD: 0.4 %
LYMPHOCYTES # BLD AUTO: 1.7 10E9/L (ref 0.8–5.3)
LYMPHOCYTES NFR BLD AUTO: 23.6 %
MCH RBC QN AUTO: 33.1 PG (ref 26.5–33)
MCHC RBC AUTO-ENTMCNC: 36.1 G/DL (ref 31.5–36.5)
MCV RBC AUTO: 92 FL (ref 78–100)
MONOCYTES # BLD AUTO: 0.4 10E9/L (ref 0–1.3)
MONOCYTES NFR BLD AUTO: 5.6 %
NEUTROPHILS # BLD AUTO: 4.9 10E9/L (ref 1.6–8.3)
NEUTROPHILS NFR BLD AUTO: 70 %
NRBC # BLD AUTO: 0 10*3/UL
NRBC BLD AUTO-RTO: 0 /100
PLATELET # BLD AUTO: 279 10E9/L (ref 150–450)
PROT SERPL-MCNC: 7.8 G/DL (ref 6.8–8.8)
RBC # BLD AUTO: 4.8 10E12/L (ref 4.4–5.9)
WBC # BLD AUTO: 7 10E9/L (ref 4–11)

## 2018-08-31 PROCEDURE — 86140 C-REACTIVE PROTEIN: CPT | Performed by: INTERNAL MEDICINE

## 2018-08-31 PROCEDURE — 85652 RBC SED RATE AUTOMATED: CPT | Performed by: INTERNAL MEDICINE

## 2018-08-31 PROCEDURE — 80076 HEPATIC FUNCTION PANEL: CPT | Performed by: INTERNAL MEDICINE

## 2018-08-31 PROCEDURE — 25000128 H RX IP 250 OP 636: Mod: ZF | Performed by: INTERNAL MEDICINE

## 2018-08-31 PROCEDURE — 96415 CHEMO IV INFUSION ADDL HR: CPT

## 2018-08-31 PROCEDURE — 96413 CHEMO IV INFUSION 1 HR: CPT

## 2018-08-31 PROCEDURE — 85025 COMPLETE CBC W/AUTO DIFF WBC: CPT | Performed by: INTERNAL MEDICINE

## 2018-08-31 RX ORDER — METHYLPREDNISOLONE SODIUM SUCCINATE 125 MG/2ML
125 INJECTION, POWDER, LYOPHILIZED, FOR SOLUTION INTRAMUSCULAR; INTRAVENOUS ONCE
Status: CANCELLED | OUTPATIENT
Start: 2018-08-31 | End: 2018-08-31

## 2018-08-31 RX ORDER — ACETAMINOPHEN 325 MG/1
650 TABLET ORAL ONCE
Status: CANCELLED
Start: 2018-08-31 | End: 2018-08-31

## 2018-08-31 RX ORDER — DIPHENHYDRAMINE HCL 25 MG
25 CAPSULE ORAL ONCE
Status: CANCELLED
Start: 2018-08-31 | End: 2018-08-31

## 2018-08-31 RX ADMIN — SODIUM CHLORIDE 50 ML: 9 INJECTION, SOLUTION INTRAVENOUS at 15:37

## 2018-08-31 RX ADMIN — INFLIXIMAB 500 MG: 100 INJECTION, POWDER, LYOPHILIZED, FOR SOLUTION INTRAVENOUS at 13:41

## 2018-08-31 ASSESSMENT — PAIN SCALES - GENERAL: PAINLEVEL: MILD PAIN (2)

## 2018-08-31 NOTE — MR AVS SNAPSHOT
After Visit Summary   8/31/2018    Emil Garcia    MRN: [id number]           Patient Information     Date Of Birth          1990        Visit Information        Provider Department      8/31/2018 1:00 PM UC 41 ATC; UC SPEC INFUSION [facility] Special[facility] Procedure        Today's Diagnoses     Crohn's disease without complication, unspecified gastrointestinal tract location (H)    -  1      Care Instructions    Dear Emil Garcia    Thank you for choosing [facility] ([facility]) for your infusion.  The following information is a summary of our appointment as well as important reminders.        Infliximab Solution for injection  What is this medicine?  INFLIXIMAB (in FLIX i mab) is used to treat Crohn's disease and ulcerative colitis. It is also used to treat ankylosing spondylitis, psoriasis, and some forms of arthritis.  This medicine may be used for other purposes; ask your health care provider or pharmacist if you have questions.  What should I tell my health care provider before I take this medicine?  They need to know if you have any of these conditions:    diabetes    exposure to tuberculosis    heart failure    hepatitis or liver disease    immune system problems    infection    lung or breathing disease, like COPD    multiple sclerosis    current or past resident of [place]    seizure disorder    an unusual or allergic reaction to infliximab, mouse proteins, other medicines, foods, dyes, or preservatives    pregnant or trying to get pregnant    breast-feeding  How should I use this medicine?  This medicine is for injection into a vein. It is usually given by a health care professional in a hospital or clinic setting.  A special MedGuide will be given to you by the pharmacist with each prescription and refill. Be sure to read this information carefully each time.  Talk to your  pediatrician regarding the use of this medicine in children. Special care may be needed.  Overdosage: If you think you have taken too much of this medicine contact a poison control center or emergency room at once.  NOTE: This medicine is only for you. Do not share this medicine with others.  What if I miss a dose?  It is important not to miss your dose. Call your doctor or health care professional if you are unable to keep an appointment.  What may interact with this medicine?  Do not take this medicine with any of the following medications:    anakinra    rilonacept  This medicine may also interact with the following medications:    vaccines  This list may not describe all possible interactions. Give your health care provider a list of all the medicines, herbs, non-prescription drugs, or dietary supplements you use. Also tell them if you smoke, drink alcohol, or use illegal drugs. Some items may interact with your medicine.  What should I watch for while using this medicine?  Visit your doctor or health care professional for regular checks on your progress.  If you get a cold or other infection while receiving this medicine, call your doctor or health care professional. Do not treat yourself. This medicine may decrease your body's ability to fight infections. Before beginning therapy, your doctor may do a test to see if you have been exposed to tuberculosis.  This medicine may make the symptoms of heart failure worse in some patients. If you notice symptoms such as increased shortness of breath or swelling of the ankles or legs, contact your health care provider right away.  If you are going to have surgery or dental work, tell your health care professional or dentist that you have received this medicine.  If you take this medicine for plaque psoriasis, stay out of the sun. If you cannot avoid being in the sun, wear protective clothing and use sunscreen. Do not use sun lamps or tanning beds/booths.  What side  effects may I notice from receiving this medicine?  Side effects that you should report to your doctor or health care professional as soon as possible:    allergic reactions like skin rash, itching or hives, swelling of the face, lips, or tongue    chest pain    fever or chills, usually related to the infusion    muscle or joint pain    red, scaly patches or raised bumps on the skin    signs of infection - fever or chills, cough, sore throat, pain or difficulty passing urine    swollen lymph nodes in the neck, underarm, or groin areas    unexplained weight loss    unusual bleeding or bruising    unusually weak or tired    yellowing of the eyes or skin  Side effects that usually do not require medical attention (report to your doctor or health care professional if they continue or are bothersome):    headache    heartburn or stomach pain    nausea, vomiting  This list may not describe all possible side effects. Call your doctor for medical advice about side effects. You may report side effects to FDA at 5-181-FDA-5208.  Where should I keep my medicine?  This drug is given in a hospital or clinic and will not be stored at home.  NOTE:This sheet is a summary. It may not cover all possible information. If you have questions about this medicine, talk to your doctor, pharmacist, or health care provider. Copyright  2016 Gold Standard    EDUCATION POST BIOLOGICAL/CHEMOTHERAPY INFUSION  Call the triage nurse at your clinic or seek medical attention if you have chills and/or temperature greater than or equal to 100.5, uncontrolled nausea/vomiting, diarrhea, constipation, dizziness, shortness of breath, chest pain, heart palpitations, weakness or any other new or concerning symptoms, questions or concerns.  You can not have any live virus vaccines prior to or during treatment or up to 6 months post infusion.  If you have an upcoming surgery, medical procedure or dental procedure during treatment, this should be discussed with  your ordering physician and your surgeon/dentist.  If you are having any concerning symptom, if you are unsure if you should get your next infusion or wish to speak to a provider before your next infusion, please call your care coordinator or triage nurse at your clinic to notify them so we can adequately serve you.      We look forward in seeing you on your next appointment here at [facility].  Please don t hesitate to call us at [phone] to reschedule any of your appointments or to speak with one of the [facility] registered nurses.  It was a pleasure taking care of you today.    Sincerely,    [facility]  Specialty Infusion & Procedure Center  [address] [place] [address]  [address]  Phone:  [phone]            Follow-ups after your visit        Your next 10 appointments already scheduled     Sep 25, 2018  3:00 PM CDT   (Arrive by 2:45 PM)   RETURN INFLAMMATORY BOWEL DISEASE with Jim Patten MD   [facility] ([facility])    [address] [place] [address]  4th Floor  [facility] [id number] [phone]            Sep 28, 2018 12:00 PM CDT   Infusion 120 with UC SPEC INFUSION, UC 50 ATC   [facility] Specialty and Procedure ([facility])    [address]  [address]  [facility] [address] [phone]            Oct 01, 2018  1:00 PM CDT   (Arrive by 12:45 PM)   New Patient Visit with Elvia Mix PhD   [facility] ([facility])    [address] [place] [address]  4th Floor  [facility] [address]   [phone]            Oct 26, 2018 12:00 PM CDT   Infusion 120 with UC SPEC INFUSION, UC 50 ATC   [facility] Specialty and Procedure ([facility])    [address]  [address]  [facility] [address] [phone]            Nov 30, 2018 12:00 PM CST   Infusion 120 with  UC SPEC INFUSION, UC 50 ATC   [facility] Specialty and Procedure ([facility])    [address]  [address]  [facility] [address] [phone]            Dec 28, 2018 12:00 PM CST   Infusion 120 with UC SPEC INFUSION, UC 50 ATC   [facility] Specialty and Procedure ([facility])    [address]  [address]  [facility] [address] [phone]              Future tests that were ordered for you today     Open Standing Orders        Priority Remaining Interval Expires Ordered    Notify Physician Routine [id number] PRN  8/31/2018            Who to contact     If you have questions or need follow up information about today's clinic visit or your schedule please contact [facility] SPECIALTY AND PROCEDURE directly at [phone].  Normal or non-critical lab and imaging results will be communicated to you by Dreamitizehart, letter or phone within 4 business days after the clinic has received the results. If you do not hear from us within 7 days, please contact the clinic through Dreamitizehart or phone. If you have a critical or abnormal lab result, we will notify you by phone as soon as possible.  Submit refill requests through Techfoo or call your pharmacy and they will forward the refill request to us. Please allow 3 business days for your refill to be completed.          Additional Information About Your Visit        DreamitizeharAnedot Information     Techfoo gives you secure access to your electronic health record. If you see a primary care provider, you can also send messages to your care team and make appointments. If you have questions, please call your primary care clinic.  If you do not have a primary care provider, please call [phone] and they will assist you.        Care EveryWhere ID     This is your Care EveryWhere ID. This could be used by other organizations to access your [place]  "medical records  [id number]        Your Vitals Were     Pulse Temperature Respirations Height Pulse Oximetry BMI (Body Mass Index)    75 98.5  F (36.9  C) (Oral) 16 1.66 m (5' 5.35\") 99% 18.83 kg/m2       Blood Pressure from Last 3 Encounters:   08/31/18 100/67   08/03/18 112/73   07/10/18 106/62    Weight from Last 3 Encounters:   08/31/18 51.9 kg (114 lb 6.4 oz)   08/03/18 53.6 kg (118 lb 3.2 oz)   07/10/18 54.5 kg (120 lb 3.2 oz)              We Performed the Following     CBC with platelets differential     CRP inflammation     Erythrocyte sedimentation rate auto     Hepatic panel        Primary Care Provider Fax #    Physician No Ref-Primary [phone]       No address on file        Equal Access to Services     HAYLEE ALANIS : Shahla Pereira, cecil yuan, batsheva fried, beatris arteaga . So [facility] [phone].    ATENCIÓN: Si habla español, tiene a perkins disposición servicios gratuitos de asistencia lingüística. Richard al [phone].    We comply with applicable federal civil rights laws and [place] laws. We do not discriminate on the basis of race, color, national origin, age, disability, sex, sexual orientation, or gender identity.            Thank you!     Thank you for choosing [facility] SPECIALTY AND PROCEDURE  for your care. Our goal is always to provide you with excellent care. Hearing back from our patients is one way we can continue to improve our services. Please take a few minutes to complete the written survey that you may receive in the mail after your visit with us. Thank you!             Your Updated Medication List - Protect others around you: Learn how to safely use, store and throw away your medicines at www.disposemymeds.org.          This list is accurate as of 8/31/18  4:05 PM.  Always use your most recent med list.                   Brand Name Dispense Instructions for use Diagnosis    BENADRYL PO      Take " 50 mg by mouth as needed        budesonide 3 MG EC capsule    ENTOCORT EC    270 capsule    Take 3 capsules (9 mg) by mouth every morning    Crohn's disease of large intestine without complication (H)       dicyclomine 10 MG capsule    BENTYL    120 capsule    Take of capsule (10mg) two times a day and increase to 4 times a day as needed.    Crohn's disease (H)       inFLIXimab 100 MG injection    REMICADE     100 mg IV every 8 weeks        mesalamine 1.2 g EC tablet    LIALDA    120 tablet    Take 4 tablets (4,800 mg) by mouth daily    Crohn's disease (H)       MULTIVITAL PO      Take  by mouth.

## 2018-08-31 NOTE — PATIENT INSTRUCTIONS
Dear Emil Garcia    Thank you for choosing [facility] ([facility]) for your infusion.  The following information is a summary of our appointment as well as important reminders.        Infliximab Solution for injection  What is this medicine?  INFLIXIMAB (in FLIX i mab) is used to treat Crohn's disease and ulcerative colitis. It is also used to treat ankylosing spondylitis, psoriasis, and some forms of arthritis.  This medicine may be used for other purposes; ask your health care provider or pharmacist if you have questions.  What should I tell my health care provider before I take this medicine?  They need to know if you have any of these conditions:    diabetes    exposure to tuberculosis    heart failure    hepatitis or liver disease    immune system problems    infection    lung or breathing disease, like COPD    multiple sclerosis    current or past resident of [place]    seizure disorder    an unusual or allergic reaction to infliximab, mouse proteins, other medicines, foods, dyes, or preservatives    pregnant or trying to get pregnant    breast-feeding  How should I use this medicine?  This medicine is for injection into a vein. It is usually given by a health care professional in a hospital or clinic setting.  A special MedGuide will be given to you by the pharmacist with each prescription and refill. Be sure to read this information carefully each time.  Talk to your pediatrician regarding the use of this medicine in children. Special care may be needed.  Overdosage: If you think you have taken too much of this medicine contact a poison control center or emergency room at once.  NOTE: This medicine is only for you. Do not share this medicine with others.  What if I miss a dose?  It is important not to miss your dose. Call your doctor or health care professional if you are unable to keep an appointment.  What may interact  with this medicine?  Do not take this medicine with any of the following medications:    anakinra    rilonacept  This medicine may also interact with the following medications:    vaccines  This list may not describe all possible interactions. Give your health care provider a list of all the medicines, herbs, non-prescription drugs, or dietary supplements you use. Also tell them if you smoke, drink alcohol, or use illegal drugs. Some items may interact with your medicine.  What should I watch for while using this medicine?  Visit your doctor or health care professional for regular checks on your progress.  If you get a cold or other infection while receiving this medicine, call your doctor or health care professional. Do not treat yourself. This medicine may decrease your body's ability to fight infections. Before beginning therapy, your doctor may do a test to see if you have been exposed to tuberculosis.  This medicine may make the symptoms of heart failure worse in some patients. If you notice symptoms such as increased shortness of breath or swelling of the ankles or legs, contact your health care provider right away.  If you are going to have surgery or dental work, tell your health care professional or dentist that you have received this medicine.  If you take this medicine for plaque psoriasis, stay out of the sun. If you cannot avoid being in the sun, wear protective clothing and use sunscreen. Do not use sun lamps or tanning beds/booths.  What side effects may I notice from receiving this medicine?  Side effects that you should report to your doctor or health care professional as soon as possible:    allergic reactions like skin rash, itching or hives, swelling of the face, lips, or tongue    chest pain    fever or chills, usually related to the infusion    muscle or joint pain    red, scaly patches or raised bumps on the skin    signs of infection - fever or chills, cough, sore throat, pain or difficulty  passing urine    swollen lymph nodes in the neck, underarm, or groin areas    unexplained weight loss    unusual bleeding or bruising    unusually weak or tired    yellowing of the eyes or skin  Side effects that usually do not require medical attention (report to your doctor or health care professional if they continue or are bothersome):    headache    heartburn or stomach pain    nausea, vomiting  This list may not describe all possible side effects. Call your doctor for medical advice about side effects. You may report side effects to FDA at 8-903-CGI-0322.  Where should I keep my medicine?  This drug is given in a hospital or clinic and will not be stored at home.  NOTE:This sheet is a summary. It may not cover all possible information. If you have questions about this medicine, talk to your doctor, pharmacist, or health care provider. Copyright  2016 Gold Standard    EDUCATION POST BIOLOGICAL/CHEMOTHERAPY INFUSION  Call the triage nurse at your clinic or seek medical attention if you have chills and/or temperature greater than or equal to 100.5, uncontrolled nausea/vomiting, diarrhea, constipation, dizziness, shortness of breath, chest pain, heart palpitations, weakness or any other new or concerning symptoms, questions or concerns.  You can not have any live virus vaccines prior to or during treatment or up to 6 months post infusion.  If you have an upcoming surgery, medical procedure or dental procedure during treatment, this should be discussed with your ordering physician and your surgeon/dentist.  If you are having any concerning symptom, if you are unsure if you should get your next infusion or wish to speak to a provider before your next infusion, please call your care coordinator or triage nurse at your clinic to notify them so we can adequately serve you.      We look forward in seeing you on your next appointment here at [facility].  Please don t hesitate to call us at [phone] to reschedule any of  your appointments or to speak with one of the [facility] registered nurses.  It was a pleasure taking care of you today.    Sincerely,    [facility]  Specialty Infusion & Procedure Center  [address] [place] [address]  [address]  Phone:  [phone]

## 2018-08-31 NOTE — PROGRESS NOTES
Nursing Note  Emil Garcia [facility] for:   Chief Complaint   Patient presents with     Infusion     Remicade     During today's [facility] appointment, orders from Dr. Patten were completed.  Frequency: every 28 days (frequency change)    Progress note:  Patient identification verified by name and date of birth.  Assessment completed.  Vitals recorded in Doc Flowsheets.  Patient was provided with education regarding infusion and possible side effects.  Patient verbalized understanding.      needed: No  Premedications: declined due to patient preference, patient has history or reaction but prefers to receive the Ramicade over 2 hours, instead of taking pre-medications.  Infusion Rates: 162 ml/hr. 2 hours  Approximate Infusion length:2.5 hours.   Labs: were drawn per orders.   Vascular access: peripheral IV placed today.  Treatment Conditions: Biologic/Chemo Checklist ~~~ NOTE: If the patient answers yes to any of the questions below, hold the infusion and contact ordering provider or on-call provider.    1. Have you recently had an elevated temperature, fever, chills, productive cough, coughing for 3 weeks or longer or hemoptysis,  abnormal vital signs, night sweats,  chest pain or have you noticed a decrease in your appetite, unexplained weight loss or fatigue? No  2. Do you have any open wounds or new incisions? No  3. Do you have any recent or upcoming hospitalizations, surgeries or dental procedures? No  4. Do you currently have or recently have had any signs of illness or infection or are you on any antibiotics? No  5. Have you had any new, sudden or worsening abdominal pain? Yes, abdominal pain comes and goes. Feels he is in a flare, denies N/V, diarrhea, constipation.  6. Have you or anyone in your household received a live vaccination in the past 4 weeks? Please note:  No live vaccines while on biologic/chemotherapy  until 6 months after the last treatment.  Patient can receive the flu vaccine (shot only) and the pneumovax.  It is optimal for the patient to get these vaccines mid cycle, but they can be given at any time as long as it is not on the day of the infusion. No  7. Have you recently been diagnosed with any new nervous system diseases (ie. Multiple sclerosis, Guillain Counselor, seizures, neurological changes) or cancer diagnosis? Are you on any form of radiation or chemotherapy? No  8. Are you pregnant or breast feeding or do you have plans of pregnancy in the future? No  9. Have you been having any signs of worsening depression or suicidal ideations?  (benlysta only) No  10. Have there been any other new onset medical symptoms? No  Okay per Dr. Patten to proceed with infusion today. Clarified Infliximab level not due at this time.    Patient tolerated infusion: well.    POST-INFUSION OF BIOLOGICAL MEDICATION:     Reviewed with patient.  Given biologic medication or medication hand-out. Inform patient if any fever, chills or signs of infection, new symptoms, abdominal pain, heart palpitations, shortness of breath, reaction, weakness, neurological changes, seek medical attention immediately and should not receive infusions. No live virus vaccines prior to or during treatment or up to 6 months post infusion. If the patient has an upcoming procedure or surgery, this should be discussed with the rheumatologist and surgeon or provider.    Discharge Plan:   Follow up plan of care with: ongoing infusions at [facility].  Discharge instructions were reviewed with patient.  Patient/representative verbalized understanding of discharge instructions and all questions answered.  Patient discharged from [facility] in stable condition.    Skye Duron RN    Administrations This Visit     0.9% sodium chloride BOLUS     Admin Date Action Dose Route Administered By              "08/31/2018 New Bag 50 mL Intravenous Skye Duron, RN                    inFLIXimab (REMICADE) 500 mg in sodium chloride 0.9 % 325 mL infusion     Admin Date Action Dose Rate Route Administered By          08/31/2018 New Bag 500 mg 162.5 mL/hr Intravenous Skye Duron, RN                        /61  Pulse 81  Temp 98.5  F (36.9  C) (Oral)  Resp 16  Ht 1.66 m (5' 5.35\")  Wt 51.9 kg (114 lb 6.4 oz)  SpO2 99%  BMI 18.83 kg/m2    "

## 2018-09-11 ENCOUNTER — CARE COORDINATION (OUTPATIENT)
Dept: GASTROENTEROLOGY | Facility: CLINIC | Age: 28
End: 2018-09-11

## 2018-09-11 DIAGNOSIS — K50.90 CROHN'S DISEASE (H): Primary | ICD-10-CM

## 2018-09-11 DIAGNOSIS — K58.9 IRRITABLE BOWEL SYNDROME: ICD-10-CM

## 2018-09-12 ENCOUNTER — CARE COORDINATION (OUTPATIENT)
Dept: GASTROENTEROLOGY | Facility: CLINIC | Age: 28
End: 2018-09-12

## 2018-09-17 ENCOUNTER — OFFICE VISIT (OUTPATIENT)
Dept: GASTROENTEROLOGY | Facility: CLINIC | Age: 28
End: 2018-09-17
Payer: COMMERCIAL

## 2018-09-17 VITALS — BODY MASS INDEX: 18.44 KG/M2 | WEIGHT: 112 LBS

## 2018-09-17 DIAGNOSIS — K50.90 CROHN'S DISEASE (H): Primary | ICD-10-CM

## 2018-09-17 DIAGNOSIS — Z71.3 NUTRITIONAL COUNSELING: ICD-10-CM

## 2018-09-17 NOTE — LETTER
9/17/2018       RE: Emil Garcia  [address] [address]     Dear Colleague,    Thank you for referring your patient, Emil Garcia, to the [facility] at [facility]. Please see a copy of my visit note below.    [facility] Outpatient Medical Nutrition Therapy      Time Spent:  60 minutes  Session Type:  Initial Individual Session  Referring Physician:  Dr. Patten  Reason for RD Visit:   IBD     Nutrition Plan: Work to increase calorie/protein intake to stop weight loss and aid with weight regain. Do this by slowly working to increase variety of diet - using food/symptom journal to guide food reintroduction. Continue Ensure daily for now. Restart daily multivitamin. Add high calorie fruit/vegetable smoothie from recipes provided to aid with caloric intake and reintroduction of food. Goal is caloric intake of at least ~1300 kcal/day (ideally closer to 1800 kcal/day).    Recommendations for MD/Provider to order: None at this time.    Malnutrition Diagnosis: Severe malnutrition  In Context of:  Chronic illness or disease     Nutrition Assessment:  Mr. Garcia is here for intial visit with Registered Dietitian (RD).  Patient is a 27 year old male with history of Crohn's disease diagnosed in 2014. He has experienced substantial weight loss since February of this year (~25%; 67.4 kg down to 50.9 kg) in large part due to diet restriction following a flare in May of this year. Mr. Garcia has limited his intake to essentially rice/potatoes since that time and has been afraid to begin reintroducing foods due to concerns regarding recovery from flare. He notes that prior to this flaring episode he ate a varied diet and did not restrict any food items. He reports a usual body weight of ~140 pounds. Notably, he reports this is his first experience with a flare since diagnosis in 2014. His primary symptom of concern is bloating/abdominal pain, but he  also struggles with constipation.     Symptom Review  1. Nausea/vomiting? No  2. Heartburn? Yes: Irregular (2x/wk on average)  3. Bloating? Yes: Daily (constan)  4. Belching? Yes: Daily  5. Feeling full quickly? Yes: Feels hungry, but then symptoms shortly after starting to eat limit intake  6. Decreased appetite? No  7. Weight loss/gain? Yes: 25% weight loss in past 6 months  8. Constipation/Diarrhea? Yes: Irregular, but right now feels more like constipation  9. Abdominal pain/pain with or without eating? Yes: Always there  10. Feeling tired? Yes: Notes this has actually improved slightly    Dietary beliefs and Practices  1.  Did you modify your diet leading up to diagnosis OR Have you modified your diet since diagnosis?  Yes Since flare in April/May limits himself to potatoes, rice, (i.e. softer foods)  2. Do you believe that food/nutrition is an important part of your disease management?       Yes   More important than medications  3.  Are there specific foods you avoid? No  4.  Do you believe that specific foods can cause relapse? Yes   5.  Are there specific foods that you feel help? (symptoms/relapse) No  6.  Have you received prior advice on diet?  No   A. If so, source? N/A  7.  Have you, or do you follow, a specific diet?  No   A. Which one(s)?  N/A   B. Did/Do you find it beneficial?  N/A    I. If able to articulate, what specifically is the benefit? N/A  8.  Do you take any vitamin, mineral, or herbal supplements? No  9.  Do you use any calorie/protein supplements?  Yes: Ensure  10.  Do you think IBD has influenced your pleasure in eating?  No  11.  Do you feel stressed or anxious about eating? If so why? No  11.  Do you avoid eating in social settings (e.g. Restaurants)?  No  12.  Are you interested in receiving advice on diet?   Yes     Diet Recall:  (Typical day)  Meal Name Time Food    Breakfast 6:45 a.m. Rice cereal (rice flour and water); Sometimes adds an egg yolk        Snack 7:30 a.m. Bag of  "chips (baked)        Snack 10:00 a.m. Rice milk (~8 oz); Banana (sometimes)        Lunch 12:00 p.m. Potato/squash pancakes, rice congee        Snack 2:00 p.m. Ensure        Dinner 6:00 p.m. Potato/squash pancakes, rice congee (sometimes salmon)        Beverages  Water        Alcohol   No     Frequency of eating/taking out meals: None since flare  Food access/availability: Fine  Food preparation confidence/abilities: Lives with parents and they prepare meals    Anthropometrics:   Body mass index is 18.44 kg/(m^2).   Wt Readings from Last 1 Encounters:   09/17/18 50.8 kg (112 lb)     Ht Readings from Last 1 Encounters:   08/31/18 1.66 m (5' 5.35\")     Usual Weight: 140 pounds (63.6 kg)  Weight change in past 6 months: 25% weight loss  Weight History:  Wt Readings from Last 10 Encounters:   09/17/18 50.8 kg (112 lb)   08/31/18 51.9 kg (114 lb 6.4 oz)   08/03/18 53.6 kg (118 lb 3.2 oz)   07/10/18 54.5 kg (120 lb 3.2 oz)   06/08/18 56.6 kg (124 lb 11.2 oz)   05/04/18 60.7 kg (133 lb 12.8 oz)   04/13/18 64.7 kg (142 lb 9.6 oz)   02/16/18 67.4 kg (148 lb 9.6 oz)   01/17/18 66.5 kg (146 lb 8 oz)   12/22/17 66.3 kg (146 lb 1.6 oz)     Labs:  Reviewed  Pertinent Medications/vitamin and mineral supplements:   Outpatient Encounter Prescriptions as of 9/17/2018   Medication Sig Dispense Refill     budesonide (ENTOCORT EC) 3 MG EC capsule Take 3 capsules (9 mg) by mouth every morning 270 capsule 1     dicyclomine (BENTYL) 10 MG capsule Take of capsule (10mg) two times a day and increase to 4 times a day as needed. 120 capsule 3     DiphenhydrAMINE HCl (BENADRYL PO) Take 50 mg by mouth as needed        inFLIXimab (REMICADE) 100 MG injection 100 mg IV every 8 weeks       mesalamine (LIALDA) 1.2 g EC tablet Take 4 tablets (4,800 mg) by mouth daily 120 tablet 3     Multiple Vitamins-Minerals (MULTIVITAL PO) Take  by mouth.       No facility-administered encounter medications on file as of 9/17/2018.        Food Allergies: None  Food " "Intolerances: Believes he may be lactose intollerant  Estimated Nutrition Needs based on current body weight of 50.9 k-1800 calories (25-35 kcals/kg), 50 g protein (1 g/kg), 1500 ml fluids (~1 ml/kcal or total fluids per MD).     MALNUTRITION:  % Weight Loss:  > 10% in 6 months (severe malnutrition)  % Intake:  </= 50% for >/= 1 month (severe malnutrition)  Subcutaneous Fat Loss:  None observed  Muscle Loss:  None observed  Fluid Retention:  None noted    Nutrition Diagnosis:    Food and nutrition related knowledge deficit related to Crohn's disease  as evidenced by need for diet education.    Nutrition Prescription: General diet    Nutrition Intervention:    Nutrition Education/Counseling:  Discussed recent weight loss and diet during that time. Encouraged the patient to begin adding foods back into his diet. He expressed hesitation with this idea, which is what has prevented him from reintroducing foods since his flare, but is agreeable to try at this time. Encouraged him to start with a shake/smoothie (example recipe below) to both increase calories and variety in the short-term. As he adds foods, encouraged him to introduce small quantities and keep track of symptoms. We discussed at this time that there are no foods we would explicitely tell him to \"avoid\". Estimate that he is currently consuming </= 1,000 kcal/day. Encouraged him to continue drinking the Ensure until caloric intake has improved beyond 1300 kcal/day. Goal for weight regain is ~1800 kcal/day (35 kcal/kg).     Strawberry crush (From [facility])  2 c. frozen strawberries    c. crushed pineapple    c. water    medium banana  6 tablespoons sugar    c. lemon juice  2 tablespoons honey  (640 calories)    Educational Materials Provided:  Inflammatory Bowel Disease Diet handout and shake recipes  Patient verbalized understanding of education provided. See all recommendations under Goals.    Goals:  1. Continue small frequent meals and " slowly work to increase variety of diet. Do this in combination with your food/symptom journal to identify foods that are well- or poorly-tolerated.    2. Work to increase caloric intake to stop weight loss and aid with weight regain. Goal of ~1300 calories to stop weight loss. Goal of ~1800 calories to help with weight gain.    3. Continue the Ensure daily for now.    4. Add a high-calorie fruit/vegetable smoothie from the recipes provided to aid with caloric intake and reintroduction of food following flare in April/May of this year.    5. Restart daily multivitamin      Nutrition Monitoring and Evaluation: Will monitor adherence to nutrition recommendations at future RD visits. Will monitor weight changes, caloric intake, and diet variety.    Further Medical Nutrition Therapy:  Yes  Next Appointment (if applicable):  ~4 weeks  Patient was encouraged to call/contact RD with any further questions.    Yohan Daly, PhD, RD

## 2018-09-17 NOTE — MR AVS SNAPSHOT
After Visit Summary   9/17/2018    Emil Garcia    MRN: [id number]           Patient Information     Date Of Birth          1990        Visit Information        Provider Department      9/17/2018 3:30 PM Yohan Daly RD [facility] Gastroenterology and IBD Clinic        Care Instructions    Hi Mr. Garcia,    It was great meeting you today. Here is a summary of what we discussed:    1. Continue small frequent meals and slowly work to increase variety of diet. Do this in combination with your food/symptom journal to identify foods that are well- or poorly-tolerated.    2. Work to increase caloric intake to stop weight loss and aid with weight regain. Goal of ~1300 calories to stop weight loss. Goal of ~1800 calories to help with weight gain.    3. Continue the Ensure daily for now.    4. Add a high-calorie fruit/vegetable smoothie from the recipes provided to aid with caloric intake and reintroduction of food following flare in April/May of this year.    5. Restart daily multivitamin    Best regards,  Yohan ARIAS. Malika, PhD, RD            Follow-ups after your visit        Follow-up notes from your care team     Return in about 4 weeks (around 10/15/2018).      Your next 10 appointments already scheduled     Sep 25, 2018  3:00 PM CDT   (Arrive by 2:45 PM)   RETURN INFLAMMATORY BOWEL DISEASE with Jim Patten MD   [facility] Gastroenterology and IBD Clinic ([facility])    [address]  4th Floor  [place] [facility] [id number] [phone]            Sep 28, 2018 12:00 PM CDT   Infusion 120 with UC SPEC INFUSION, UC 50 ATC   [facility] ([facility])    [address]  [address]  [place] [facility] [address] [phone]            Oct 01, 2018  1:00 PM CDT   (Arrive by 12:45 PM)   New Patient Visit with Elvia Mix, PhD   [facility]ology [facility] IBD [facility] ([facility])    [address]  4th [address]  [place] [address]   [phone]            Oct 26, 2018 12:00 PM CDT   Infusion 120 with UC SPEC INFUSION, UC 50 ATC   [facility] Special[facility] Procedure ([facility])    [address]  [address]  [place] [facility] [address]   [phone]            Nov 30, 2018 12:00 PM CST   Infusion 120 with UC SPEC INFUSION, UC 50 ATC   [facility] ([facility])    [address]  [address]  [place] [address]   [phone]            Dec 28, 2018 12:00 PM CST   Infusion 120 with UC SPEC INFUSION, UC 50 ATC   [facility] ([facility])    [address]  [address]  [place] [id number] [phone]              Who to contact     Please call your clinic at [phone] to:    Ask questions about your health    Make or cancel appointments    Discuss your medicines    Learn about your test results    Speak to your doctor            Additional Information About Your Visit        [employer] Information     [employer] gives you secure access to your electronic health record. If you see a primary care provider, you can also send messages to your care team and make appointments. If you have questions, please call your primary care clinic.  If you do not have a primary care provider, please call [phone] and they will assist you.      [employer] is an electronic gateway that provides easy, online access to your medical records. With [employer], you can request a clinic appointment, read your test results, renew a prescription or communicate with your care team.     To access your existing account, please contact your [facility] or call [phone] for assistance.        Care EveryWhere ID     This is your Care EveryWhere ID. This could  be used by other organizations to access your [facility] medical records  [id number]        Your Vitals Were     BMI (Body Mass Index)                   18.44 kg/m2            Blood Pressure from Last 3 Encounters:   08/31/18 100/67   08/03/18 112/73   07/10/18 106/62    Weight from Last 3 Encounters:   09/17/18 50.8 kg (112 lb)   08/31/18 51.9 kg (114 lb 6.4 oz)   08/03/18 53.6 kg (118 lb 3.2 oz)              Today, you had the following     No orders found for display       Primary Care Provider Fax #    Physician No Ref-Primary [phone]       No address on file        Equal Access to Services     HAYLEE ALANIS : Shahla Pereira, cecil yuan, batsheva fried, beatris callaway. So [facility] [phone].    ATENCIÓN: Si habla español, tiene a perkins disposición servicios gratuitos de asistencia lingüística. Llame al [phone].    We comply with applicable federal civil rights laws and [place] laws. We do not discriminate on the basis of race, color, national origin, age, disability, sex, sexual orientation, or gender identity.            Thank you!     Thank you for choosing [facility] GASTROENTEROLOGY AND IBD CLINIC  for your care. Our goal is always to provide you with excellent care. Hearing back from our patients is one way we can continue to improve our services. Please take a few minutes to complete the written survey that you may receive in the mail after your visit with us. Thank you!             Your Updated Medication List - Protect others around you: Learn how to safely use, store and throw away your medicines at www.disposemymeds.org.          This list is accurate as of 9/17/18  4:34 PM.  Always use your most recent med list.                   Brand Name Dispense Instructions for use Diagnosis    BENADRYL PO      Take 50 mg by mouth as needed        budesonide 3 MG EC capsule    ENTOCORT EC    270 capsule    Take 3 capsules (9 mg) by mouth every morning     Crohn's disease of large intestine without complication (H)       dicyclomine 10 MG capsule    BENTYL    120 capsule    Take of capsule (10mg) two times a day and increase to 4 times a day as needed.    Crohn's disease (H)       inFLIXimab 100 MG injection    REMICADE     100 mg IV every 8 weeks        mesalamine 1.2 g EC tablet    LIALDA    120 tablet    Take 4 tablets (4,800 mg) by mouth daily    Crohn's disease (H)       MULTIVITAL PO      Take  by mouth.

## 2018-09-17 NOTE — PROGRESS NOTES
[facility] Outpatient Medical Nutrition Therapy      Time Spent:  60 minutes  Session Type:  Initial Individual Session  Referring Physician:  Dr. Patten  Reason for RD Visit:   IBD     Nutrition Plan: Work to increase calorie/protein intake to stop weight loss and aid with weight regain. Do this by slowly working to increase variety of diet - using food/symptom journal to guide food reintroduction. Continue Ensure daily for now. Restart daily multivitamin. Add high calorie fruit/vegetable smoothie from recipes provided to aid with caloric intake and reintroduction of food. Goal is caloric intake of at least ~1300 kcal/day (ideally closer to 1800 kcal/day).    Recommendations for MD/Provider to order: None at this time.    Malnutrition Diagnosis: Severe malnutrition  In Context of:  Chronic illness or disease     Nutrition Assessment:  Mr. Garcia is here for intial visit with Registered Dietitian (RD).  Patient is a 27 year old male with history of Crohn's disease diagnosed in 2014. He has experienced substantial weight loss since February of this year (~25%; 67.4 kg down to 50.9 kg) in large part due to diet restriction following a flare in May of this year. Mr. Garcia has limited his intake to essentially rice/potatoes since that time and has been afraid to begin reintroducing foods due to concerns regarding recovery from flare. He notes that prior to this flaring episode he ate a varied diet and did not restrict any food items. He reports a usual body weight of ~140 pounds. Notably, he reports this is his first experience with a flare since diagnosis in 2014. His primary symptom of concern is bloating/abdominal pain, but he also struggles with constipation.     Symptom Review  1. Nausea/vomiting? No  2. Heartburn? Yes: Irregular (2x/wk on average)  3. Bloating? Yes: Daily (constan)  4. Belching? Yes: Daily  5. Feeling full quickly? Yes: Feels hungry, but then symptoms shortly after starting to eat limit intake  6.  Decreased appetite? No  7. Weight loss/gain? Yes: 25% weight loss in past 6 months  8. Constipation/Diarrhea? Yes: Irregular, but right now feels more like constipation  9. Abdominal pain/pain with or without eating? Yes: Always there  10. Feeling tired? Yes: Notes this has actually improved slightly    Dietary beliefs and Practices  1.  Did you modify your diet leading up to diagnosis OR Have you modified your diet since diagnosis?  Yes Since flare in April/May limits himself to potatoes, rice, (i.e. softer foods)  2. Do you believe that food/nutrition is an important part of your disease management?       Yes   More important than medications  3.  Are there specific foods you avoid? No  4.  Do you believe that specific foods can cause relapse? Yes   5.  Are there specific foods that you feel help? (symptoms/relapse) No  6.  Have you received prior advice on diet?  No   A. If so, source? N/A  7.  Have you, or do you follow, a specific diet?  No   A. Which one(s)?  N/A   B. Did/Do you find it beneficial?  N/A    I. If able to articulate, what specifically is the benefit? N/A  8.  Do you take any vitamin, mineral, or herbal supplements? No  9.  Do you use any calorie/protein supplements?  Yes: Ensure  10.  Do you think IBD has influenced your pleasure in eating?  No  11.  Do you feel stressed or anxious about eating? If so why? No  11.  Do you avoid eating in social settings (e.g. Restaurants)?  No  12.  Are you interested in receiving advice on diet?   Yes     Diet Recall:  (Typical day)  Meal Name Time Food    Breakfast 6:45 a.m. Rice cereal (rice flour and water); Sometimes adds an egg yolk        Snack 7:30 a.m. Bag of chips (baked)        Snack 10:00 a.m. Rice milk (~8 oz); Banana (sometimes)        Lunch 12:00 p.m. Potato/squash pancakes, rice congee        Snack 2:00 p.m. Ensure        Dinner 6:00 p.m. Potato/squash pancakes, rice congee (sometimes salmon)        Beverages  Water        Alcohol   No  "    Frequency of eating/taking out meals: None since flare  Food access/availability: Fine  Food preparation confidence/abilities: Lives with parents and they prepare meals    Anthropometrics:   Body mass index is 18.44 kg/(m^2).   Wt Readings from Last 1 Encounters:   18 50.8 kg (112 lb)     Ht Readings from Last 1 Encounters:   18 1.66 m (5' 5.35\")     Usual Weight: 140 pounds (63.6 kg)  Weight change in past 6 months: 25% weight loss  Weight History:  Wt Readings from Last 10 Encounters:   18 50.8 kg (112 lb)   18 51.9 kg (114 lb 6.4 oz)   18 53.6 kg (118 lb 3.2 oz)   07/10/18 54.5 kg (120 lb 3.2 oz)   18 56.6 kg (124 lb 11.2 oz)   18 60.7 kg (133 lb 12.8 oz)   18 64.7 kg (142 lb 9.6 oz)   18 67.4 kg (148 lb 9.6 oz)   18 66.5 kg (146 lb 8 oz)   17 66.3 kg (146 lb 1.6 oz)     Labs:  Reviewed  Pertinent Medications/vitamin and mineral supplements:   Outpatient Encounter Prescriptions as of 2018   Medication Sig Dispense Refill     budesonide (ENTOCORT EC) 3 MG EC capsule Take 3 capsules (9 mg) by mouth every morning 270 capsule 1     dicyclomine (BENTYL) 10 MG capsule Take of capsule (10mg) two times a day and increase to 4 times a day as needed. 120 capsule 3     DiphenhydrAMINE HCl (BENADRYL PO) Take 50 mg by mouth as needed        inFLIXimab (REMICADE) 100 MG injection 100 mg IV every 8 weeks       mesalamine (LIALDA) 1.2 g EC tablet Take 4 tablets (4,800 mg) by mouth daily 120 tablet 3     Multiple Vitamins-Minerals (MULTIVITAL PO) Take  by mouth.       No facility-administered encounter medications on file as of 2018.        Food Allergies: None  Food Intolerances: Believes he may be lactose intollerant  Estimated Nutrition Needs based on current body weight of 50.9 k-1800 calories (25-35 kcals/kg), 50 g protein (1 g/kg), 1500 ml fluids (~1 ml/kcal or total fluids per MD).     MALNUTRITION:  % Weight Loss:  > 10% in 6 months " "(severe malnutrition)  % Intake:  </= 50% for >/= 1 month (severe malnutrition)  Subcutaneous Fat Loss:  None observed  Muscle Loss:  None observed  Fluid Retention:  None noted    Nutrition Diagnosis:    Food and nutrition related knowledge deficit related to Crohn's disease  as evidenced by need for diet education.    Nutrition Prescription: General diet    Nutrition Intervention:    Nutrition Education/Counseling:  Discussed recent weight loss and diet during that time. Encouraged the patient to begin adding foods back into his diet. He expressed hesitation with this idea, which is what has prevented him from reintroducing foods since his flare, but is agreeable to try at this time. Encouraged him to start with a shake/smoothie (example recipe below) to both increase calories and variety in the short-term. As he adds foods, encouraged him to introduce small quantities and keep track of symptoms. We discussed at this time that there are no foods we would explicitely tell him to \"avoid\". Estimate that he is currently consuming </= 1,000 kcal/day. Encouraged him to continue drinking the Ensure until caloric intake has improved beyond 1300 kcal/day. Goal for weight regain is ~1800 kcal/day (35 kcal/kg).     Strawberry crush (From [facility])  2 c. frozen strawberries    c. crushed pineapple    c. water    medium banana  6 tablespoons sugar    c. lemon juice  2 tablespoons honey  (640 calories)    Educational Materials Provided:  Inflammatory Bowel Disease Diet handout and shake recipes  Patient verbalized understanding of education provided. See all recommendations under Goals.    Goals:  1. Continue small frequent meals and slowly work to increase variety of diet. Do this in combination with your food/symptom journal to identify foods that are well- or poorly-tolerated.    2. Work to increase caloric intake to stop weight loss and aid with weight regain. Goal of ~1300 calories to stop weight loss. Goal of " ~1800 calories to help with weight gain.    3. Continue the Ensure daily for now.    4. Add a high-calorie fruit/vegetable smoothie from the recipes provided to aid with caloric intake and reintroduction of food following flare in April/May of this year.    5. Restart daily multivitamin      Nutrition Monitoring and Evaluation: Will monitor adherence to nutrition recommendations at future RD visits. Will monitor weight changes, caloric intake, and diet variety.    Further Medical Nutrition Therapy:  Yes  Next Appointment (if applicable):  ~4 weeks  Patient was encouraged to call/contact RD with any further questions.    Yohan Daly, PhD, RD

## 2018-09-17 NOTE — PATIENT INSTRUCTIONS
Hi Mr. Garcia,    It was great meeting you today. Here is a summary of what we discussed:    1. Continue small frequent meals and slowly work to increase variety of diet. Do this in combination with your food/symptom journal to identify foods that are well- or poorly-tolerated.    2. Work to increase caloric intake to stop weight loss and aid with weight regain. Goal of ~1300 calories to stop weight loss. Goal of ~1800 calories to help with weight gain.    3. Continue the Ensure daily for now.    4. Add a high-calorie fruit/vegetable smoothie from the recipes provided to aid with caloric intake and reintroduction of food following flare in April/May of this year.    5. Restart daily multivitamin    Best regards,  Yohan Daly, PhD, RD

## 2018-09-24 ENCOUNTER — TELEPHONE (OUTPATIENT)
Dept: GASTROENTEROLOGY | Facility: CLINIC | Age: 28
End: 2018-09-24

## 2018-09-24 ASSESSMENT — ENCOUNTER SYMPTOMS
CONSTIPATION: 0
RECTAL PAIN: 0
VOMITING: 0
BACK PAIN: 0
JAUNDICE: 0
STIFFNESS: 1
ARTHRALGIAS: 1
HEARTBURN: 0
BOWEL INCONTINENCE: 0
BLOOD IN STOOL: 0
BLOATING: 1
MUSCLE WEAKNESS: 0
NECK PAIN: 0
MUSCLE CRAMPS: 0
MYALGIAS: 0
ABDOMINAL PAIN: 1
DIARRHEA: 0
NAUSEA: 1
JOINT SWELLING: 0

## 2018-09-24 NOTE — TELEPHONE ENCOUNTER
Spoke to patient and confirmed apt on 9/25/18 @0730am with Dr. [facility]. Patient to arrive 15 min early.    ROSLYN Botello

## 2018-09-25 ENCOUNTER — OFFICE VISIT (OUTPATIENT)
Dept: GASTROENTEROLOGY | Facility: CLINIC | Age: 28
End: 2018-09-25
Payer: COMMERCIAL

## 2018-09-25 VITALS
WEIGHT: 112.7 LBS | SYSTOLIC BLOOD PRESSURE: 108 MMHG | DIASTOLIC BLOOD PRESSURE: 74 MMHG | OXYGEN SATURATION: 100 % | TEMPERATURE: 97.8 F | HEART RATE: 71 BPM | HEIGHT: 65 IN | BODY MASS INDEX: 18.78 KG/M2

## 2018-09-25 DIAGNOSIS — K50.10 CROHN'S DISEASE OF LARGE INTESTINE WITHOUT COMPLICATION (H): Primary | ICD-10-CM

## 2018-09-25 ASSESSMENT — PAIN SCALES - GENERAL: PAINLEVEL: MILD PAIN (2)

## 2018-09-25 NOTE — LETTER
9/25/2018       RE: Emil Garcia  [address] [facility] [address]     Dear Colleague,    Thank you for referring your patient, Emil Garcia, to the [facility] GASTROENTEROLOGY AND IBD CLINIC at [facility]. Please see a copy of my visit note below.    IBD CLINIC VISIT     CC/REFERRING MD:  Self referred  REASON FOR FOLLOW UP: Crohn's   COLLABORATING PHYSICIAN: Jim Patten MD    IBD HISTORY  Age at diagnosis: 23, 2014  Extent of disease: R colon/cecum  Disease phenotype: inflammation  Cathy-anal disease: None  Current CD medications:   - Infliximab 5mg/kg q8 weeks (started 2014)   - Infliximab 10mg/kg q8 weeks (6/8/18)  - Budesonide 9mg  - Lialda 4.8g per day  Prior CD surgeries: None  Prior IBD Medications:   - Budesonide    DRUG MONITORING  TPMT enzyme activity: --    6-TGN/6-MMPN levels: --    Biologic concentration:  3/17/17 Inflixumab level 5.1     DISEASE ASSESSMENT  Labs:  Lab Results   Component Value Date    ALBUMIN 4.0 09/01/2017     Recent Labs   Lab Test  08/31/18   1305  06/08/18   1245   CRP  <2.9  <2.9   SED  6  7     Endoscopic assessment: Colonoscopy in 2/2016 with mild chronic active colitis in R colon/cecum and normal histology on remainder of random colon biopsies  Enterography: --  Fecal calprotectin: < 27 (8/10/18)  C diff: --    ASSESSMENT/PLAN  Emil is a 27 year old male with history of colonic Crohn's disease.      1.  Colonic Crohn's disease: Since increasing his infliximab to 10 mg/kg his fecal calprotectin has become normal.  He continues to have symptoms that are more consistent with irritable bowel syndrome.  These have started in conjunction with stress from a new job.  We continue to discuss the disconnect between inflammation in his symptoms.  Will proceed with MRE to further assess for active inflammation.  -- Stop budesonide  -- Continue Infliximab 10mg/kg every 8 weeks  -- Continue Lialda for now, anticipate de-escalation over  next year.   -- MRE to re-stage disease    2: Abdominal pain/IBS: Suspect functional. I do not recommend breath testing as he has no anatomic reason for bacterial overgrowth.  Recommend establish with health psychologist and or psychiatrist. Continue to follow-up with nutrition.     3.  Constipation.  Resolved at this time. Not using Miralax although previously responded well - will monitor for recurrence.      4. Disability paperwork: I informed the patient that it was my impression that he did not meet criteria for disability at this time.  With his most severe pain being 3 out of 10 I am not sure why he cannot complete his daily activities.  I suspect this is more tied to stress and anxiety related to work.  I again reiterated that he seemed like he was under a significant amount of stress.  He had difficulty maintaining eye contact was constantly fidgeting.  He generally give the impression of being quite uncomfortable.  I told him he would have to discuss disability further with his PCP and that I would not complete this paperwork.    We had a long discussion today about trust and his goals of care.  Patient acknowledged he feels that he has lost trust and myself in our office staff.  He is concerned that he feels he gets different messages from nursing and physicians.  He decided one example where nursing told him to fecal calprotectin was improved, however RN stated it was minimally changed.  We discussed that often the office staff is doing her best to be positive and may have focused on the wrong aspects.    We discussed the importance of therapeutic trust and managing chronic disease such as Crohn's disease.  It is my impression that he has lost this with our office staff.  I spent a significant amount of time today reiterating that his symptoms were more likely related to stress irritable bowel syndrome and active Crohn's disease.  He felt that the fecal calprotectin was likely a false negative and quoted  his male physician that there is a 20% false negative fecal calprotectin's.  I also brought up the fact that he has been into my administrative offices which is not a patient care area which I informed him was inappropriate.  I suggested that he may be better off transferring his care to [place] or another healthcare system where he had a better relationship with.  He requested if he can transfer his care to Dr. Dorado here at [facility].  I discussed that we generally do not transfer patients between providers and I would have to discuss this with him.      Thank you for this consultation.  It was a pleasure to participate in the care of this patient; please contact us with any further questions.      Jim Patten MD    [facility]  Inflammatory Bowel Disease Program  Division of Gastroenterology, Hepatology and Nutrition      HPI:   Patient comes in today for follow-up.  Since I saw him last he referred himself for second opinion to [place] [facility].  I do not have the [place] report but per patient they recommended an MRA and a breath test.  He has a few specific questions that he would like to discuss as outlined below.    Three things that patient would like to discuss:  1) MRE and breath test for bacterial overgrowth  2) Stopping medications if they are not working: mesalamine and entocort   3) Patient would like disability form  4) Feels there is a disconnect between office staff and MD.     He reports continued symptoms on a daily basis.  He reports having pain bloating and cramping.  The pain can be on the right or the left side.  On the right it is typically sharp, while the left it is more crampy.  At worst the pain is a 3 out of 10.  General he is between 2 and 3.  Reports symptoms every day and reports dull symptoms almost all the time.  He reports that his quality of life is very poor and he would like to go on disability    He remains on budesonide 9 mg, Lialda 4.8 g a day and  infliximab 10 mg/kg every 8 weeks.  Since increasing his infliximab dose his fecal calprotectin has become normal.    ROS:    No fevers or chills  No weight loss  No blurry vision, double vision or change in vision  No sore throat  No lymphadenopathy  No headache, paraesthesias, or weakness in a limb  No shortness of breath or wheezing  No chest pain or pressure  No arthralgias or myalgias  No rashes or skin changes  No odynophagia or dysphagia  No BRBPR, hematochezia, melena  No dysuria, frequency or urgency  No hot/cold intolerance or polyria  No anxiety or depression    Extra intestinal manifestations of IBD:  No uveitis/episcleritis  No aphthous ulcers   No arthritis   No erythema nodosum/pyoderma gangrenosum.     PERTINENT PAST MEDICAL HISTORY:  Past Medical History:   Diagnosis Date     Cancer (H) 05/05/2018    WILL NOT ALLOW US TO COMPLETE THE  QUESTTIONAIR WITHOUT MARKING YES TO THE DIAGNOSIS TO CANCER     Crohn's colitis (H)        PREVIOUS SURGERIES:  None    PREVIOUS ENDOSCOPY:  Colonoscopy in 2/2016 with mild chronic active colitis in R colon/cecum and normal histology on remainder of random colon biopsies    Colonoscopy 5/23/18:  Moderate right sided colitis. Path with moderate right sided inflammation. Inflammatory polyp at 50cm.     ALLERGIES:     Allergies   Allergen Reactions     Nsaids      Seasonal Allergies        PERTINENT MEDICATIONS:    Current Outpatient Prescriptions:      budesonide (ENTOCORT EC) 3 MG EC capsule, Take 3 capsules (9 mg) by mouth every morning, Disp: 270 capsule, Rfl: 1     dicyclomine (BENTYL) 10 MG capsule, Take of capsule (10mg) two times a day and increase to 4 times a day as needed., Disp: 120 capsule, Rfl: 3     DiphenhydrAMINE HCl (BENADRYL PO), Take 50 mg by mouth as needed , Disp: , Rfl:      inFLIXimab (REMICADE) 100 MG injection, 100 mg IV every 8 weeks, Disp: , Rfl:      mesalamine (LIALDA) 1.2 g EC tablet, Take 4 tablets (4,800 mg) by mouth daily, Disp: 120  tablet, Rfl: 3     Multiple Vitamins-Minerals (MULTIVITAL PO), Take  by mouth., Disp: , Rfl:     SOCIAL HISTORY:  Social History     Social History     Marital status: Single     Spouse name: N/A     Number of children: N/A     Years of education: N/A     Occupational History     data processing      Social History Main Topics     Smoking status: Never Smoker     Smokeless tobacco: Never Used     Alcohol use 0.6 - 1.2 oz/week     1 - 2 Cans of beer per week      Comment: rarely     Drug use: No     Sexual activity: No     Other Topics Concern     Not on file     Social History Narrative       FAMILY HISTORY:  Family History   Problem Relation Age of Onset     Macular Degeneration Paternal Grandfather      Hypertension Paternal Grandfather      Hypertension Paternal Grandmother      Glaucoma No family hx of        Past/family/social history reviewed and no changes    PHYSICAL EXAMINATION:  Constitutional: aaox3, cooperative, pleasant, not dyspneic/diaphoretic, no acute distress  Vitals reviewed: There were no vitals taken for this visit.  Wt:   Wt Readings from Last 2 Encounters:   09/17/18 50.8 kg (112 lb)   08/31/18 51.9 kg (114 lb 6.4 oz)      Eyes: Sclera anicteric/injected  Ears/nose/mouth/throat: Normal oropharynx without ulcers or exudate, mucus membranes moist, hearing intact  Neck: supple, thyroid normal size  Skin: warm, perfused, no jaundice  Psych: Anxious  MSK: Normal gait      PERTINENT STUDIES:  Most recent CBC:  Recent Labs   Lab Test  08/31/18   1305  06/08/18   1245   WBC  7.0  6.7   HGB  15.9  15.3   HCT  44.0  42.2   PLT  279  326     Most recent hepatic panel:  Recent Labs   Lab Test  08/31/18   1305  06/08/18   1245   ALT  17  29   AST  10  16     Most recent creatinine:  Recent Labs   Lab Test  04/06/11   2315   CR  0.90       Again, thank you for allowing me to participate in the care of your patient.      Sincerely,    Jim Patten MD

## 2018-09-25 NOTE — PATIENT INSTRUCTIONS
Stop Budesonide today      Schedule mre   You are scheduled on October 4  Check in time is 7 am  Nothing to eat or drink for 6 hours  [address]   Room 1-327         Dr. Patten will discuss with Dr. Dorado transitioning care to Dr. Dorado    For questions regarding your care Monday through Friday, contact the RN GI care coordinator,  Call   [phone] . Your call will be  returned same day, or if consultation is needed with the provider, it may be following business day - or you may send a My Chart message.    For medication refills (prescribed by the GI clinic), contact your pharmacy.    For appointment rescheduling/cancellation, contact [phone]     After hours, or if you have an immediate GI concern and cannot wait for a return call, contact the GI Fellow at [phone] and select option #4.

## 2018-09-25 NOTE — NURSING NOTE
"Chief Complaint   Patient presents with     RECHECK     IBD, pain on lower right/left       Vitals:    09/25/18 0731   BP: 108/74   Pulse: 71   Temp: 97.8  F (36.6  C)   TempSrc: Oral   SpO2: 100%   Weight: 51.1 kg (112 lb 11.2 oz)   Height: 1.651 m (5' 5\")       Body mass index is 18.75 kg/(m^2).    ROSLYN Botello                          "

## 2018-09-25 NOTE — PROGRESS NOTES
IBD CLINIC VISIT     CC/REFERRING MD:  Self referred  REASON FOR FOLLOW UP: Crohn's   COLLABORATING PHYSICIAN: Jim Patten MD    IBD HISTORY  Age at diagnosis: 23, 2013  Extent of disease: R colon/cecum  Disease phenotype: inflammation  Cathy-anal disease: None  Current CD medications:   - Infliximab 5mg/kg q8 weeks (started 2014)   - Infliximab 10mg/kg q8 weeks (6/8/18)  - Budesonide 9mg  - Lialda 4.8g per day  Prior CD surgeries: None  Prior IBD Medications:   - Budesonide    DRUG MONITORING  TPMT enzyme activity: --    6-TGN/6-MMPN levels: --    Biologic concentration:  3/17/17 Inflixumab level 5.1     DISEASE ASSESSMENT  Labs:  Lab Results   Component Value Date    ALBUMIN 4.0 09/01/2017     Recent Labs   Lab Test  08/31/18   1305  06/08/18   1245   CRP  <2.9  <2.9   SED  6  7     Endoscopic assessment: Colonoscopy in 2/2016 with mild chronic active colitis in R colon/cecum and normal histology on remainder of random colon biopsies  Enterography: --  Fecal calprotectin: < 27 (8/10/18)  C diff: --    ASSESSMENT/PLAN  Emil is a 27 year old male with history of colonic Crohn's disease.      1.  Colonic Crohn's disease: Since increasing his infliximab to 10 mg/kg his fecal calprotectin has become normal.  He continues to have symptoms that are more consistent with irritable bowel syndrome.  These have started in conjunction with stress from a new job.  We continue to discuss the disconnect between inflammation in his symptoms.  Will proceed with MRE to further assess for active inflammation.  -- Stop budesonide  -- Continue Infliximab 10mg/kg every 8 weeks  -- Continue Lialda for now, anticipate de-escalation over next year.   -- MRE to re-stage disease    2: Abdominal pain/IBS: Suspect functional. I do not recommend breath testing as he has no anatomic reason for bacterial overgrowth.  Recommend establish with health psychologist and or psychiatrist. Continue to follow-up with nutrition.     3.  Constipation.   Resolved at this time. Not using Miralax although previously responded well - will monitor for recurrence.      4. Disability paperwork: I informed the patient that it was my impression that he did not meet criteria for disability at this time.  With his most severe pain being 3 out of 10 I am not sure why he cannot complete his daily activities.  I suspect this is more tied to stress and anxiety related to work.  I again reiterated that he seemed like he was under a significant amount of stress.  He had difficulty maintaining eye contact was constantly fidgeting.  He generally give the impression of being quite uncomfortable.  I told him he would have to discuss disability further with his PCP and that I would not complete this paperwork.    We had a long discussion today about trust and his goals of care.  Patient acknowledged he feels that he has lost trust and myself in our office staff.  He is concerned that he feels he gets different messages from nursing and physicians.  He decided one example where nursing told him to fecal calprotectin was improved, however RN stated it was minimally changed.  We discussed that often the office staff is doing her best to be positive and may have focused on the wrong aspects.    We discussed the importance of therapeutic trust and managing chronic disease such as Crohn's disease.  It is my impression that he has lost this with our office staff.  I spent a significant amount of time today reiterating that his symptoms were more likely related to stress irritable bowel syndrome and active Crohn's disease.  He felt that the fecal calprotectin was likely a false negative and quoted his male physician that there is a 20% false negative fecal calprotectin's.  I also brought up the fact that he has been into my administrative offices which is not a patient care area which I informed him was inappropriate.  I suggested that he may be better off transferring his care to [facility] or  another healthcare system where he had a better relationship with.  He requested if he can transfer his care to Dr. Dorado here at [facility].  I discussed that we generally do not transfer patients between providers and I would have to discuss this with him.      Thank you for this consultation.  It was a pleasure to participate in the care of this patient; please contact us with any further questions.      Jim Patten MD    [facility]  Inflammatory Bowel Disease Program  Division of Gastroenterology, Hepatology and Nutrition      HPI:   Patient comes in today for follow-up.  Since I saw him last he referred himself for second opinion to [facility].  I do not have the [facility] report but per patient they recommended an MRA and a breath test.  He has a few specific questions that he would like to discuss as outlined below.    Three things that patient would like to discuss:  1) MRE and breath test for bacterial overgrowth  2) Stopping medications if they are not working: mesalamine and entocort   3) Patient would like disability form  4) Feels there is a disconnect between office staff and MD.     He reports continued symptoms on a daily basis.  He reports having pain bloating and cramping.  The pain can be on the right or the left side.  On the right it is typically sharp, while the left it is more crampy.  At worst the pain is a 3 out of 10.  General he is between 2 and 3.  Reports symptoms every day and reports dull symptoms almost all the time.  He reports that his quality of life is very poor and he would like to go on disability    He remains on budesonide 9 mg, Lialda 4.8 g a day and infliximab 10 mg/kg every 8 weeks.  Since increasing his infliximab dose his fecal calprotectin has become normal.    ROS:    No fevers or chills  No weight loss  No blurry vision, double vision or change in vision  No sore throat  No lymphadenopathy  No headache, paraesthesias, or weakness in a limb  No  shortness of breath or wheezing  No chest pain or pressure  No arthralgias or myalgias  No rashes or skin changes  No odynophagia or dysphagia  No BRBPR, hematochezia, melena  No dysuria, frequency or urgency  No hot/cold intolerance or polyria  No anxiety or depression    Extra intestinal manifestations of IBD:  No uveitis/episcleritis  No aphthous ulcers   No arthritis   No erythema nodosum/pyoderma gangrenosum.     PERTINENT PAST MEDICAL HISTORY:  Past Medical History:   Diagnosis Date     Cancer (H) 05/05/2018    WILL NOT ALLOW US TO COMPLETE THE  QUESTTIONAIR WITHOUT MARKING YES TO THE DIAGNOSIS TO CANCER     Crohn's colitis (H)        PREVIOUS SURGERIES:  None    PREVIOUS ENDOSCOPY:  Colonoscopy in 2/2016 with mild chronic active colitis in R colon/cecum and normal histology on remainder of random colon biopsies    Colonoscopy 5/23/18:  Moderate right sided colitis. Path with moderate right sided inflammation. Inflammatory polyp at 50cm.     ALLERGIES:     Allergies   Allergen Reactions     Nsaids      Seasonal Allergies        PERTINENT MEDICATIONS:    Current Outpatient Prescriptions:      budesonide (ENTOCORT EC) 3 MG EC capsule, Take 3 capsules (9 mg) by mouth every morning, Disp: 270 capsule, Rfl: 1     dicyclomine (BENTYL) 10 MG capsule, Take of capsule (10mg) two times a day and increase to 4 times a day as needed., Disp: 120 capsule, Rfl: 3     DiphenhydrAMINE HCl (BENADRYL PO), Take 50 mg by mouth as needed , Disp: , Rfl:      inFLIXimab (REMICADE) 100 MG injection, 100 mg IV every 8 weeks, Disp: , Rfl:      mesalamine (LIALDA) 1.2 g EC tablet, Take 4 tablets (4,800 mg) by mouth daily, Disp: 120 tablet, Rfl: 3     Multiple Vitamins-Minerals (MULTIVITAL PO), Take  by mouth., Disp: , Rfl:     SOCIAL HISTORY:  Social History     Social History     Marital status: Single     Spouse name: N/A     Number of children: N/A     Years of education: N/A     Occupational History     data processing      Social  History Main Topics     Smoking status: Never Smoker     Smokeless tobacco: Never Used     Alcohol use 0.6 - 1.2 oz/week     1 - 2 Cans of beer per week      Comment: rarely     Drug use: No     Sexual activity: No     Other Topics Concern     Not on file     Social History Narrative       FAMILY HISTORY:  Family History   Problem Relation Age of Onset     Macular Degeneration Paternal Grandfather      Hypertension Paternal Grandfather      Hypertension Paternal Grandmother      Glaucoma No family hx of        Past/family/social history reviewed and no changes    PHYSICAL EXAMINATION:  Constitutional: aaox3, cooperative, pleasant, not dyspneic/diaphoretic, no acute distress  Vitals reviewed: There were no vitals taken for this visit.  Wt:   Wt Readings from Last 2 Encounters:   09/17/18 50.8 kg (112 lb)   08/31/18 51.9 kg (114 lb 6.4 oz)      Eyes: Sclera anicteric/injected  Ears/nose/mouth/throat: Normal oropharynx without ulcers or exudate, mucus membranes moist, hearing intact  Neck: supple, thyroid normal size  Skin: warm, perfused, no jaundice  Psych: Anxious  MSK: Normal gait      PERTINENT STUDIES:  Most recent CBC:  Recent Labs   Lab Test  08/31/18   1305  06/08/18   1245   WBC  7.0  6.7   HGB  15.9  15.3   HCT  44.0  42.2   PLT  279  326     Most recent hepatic panel:  Recent Labs   Lab Test  08/31/18   1305  06/08/18   1245   ALT  17  29   AST  10  16     Most recent creatinine:  Recent Labs   Lab Test  04/06/11   2315   CR  0.90       Answers for HPI/ROS submitted by the patient on 9/24/2018   General Symptoms: No  Skin Symptoms: No  HENT Symptoms: No  EYE SYMPTOMS: No  HEART SYMPTOMS: No  LUNG SYMPTOMS: No  INTESTINAL SYMPTOMS: Yes  URINARY SYMPTOMS: No  REPRODUCTIVE SYMPTOMS: No  SKELETAL SYMPTOMS: Yes  BLOOD SYMPTOMS: No  NERVOUS SYSTEM SYMPTOMS: No  MENTAL HEALTH SYMPTOMS: No  Heart burn or indigestion: No  Nausea: Yes  Vomiting: No  Abdominal pain: Yes  Bloating: Yes  Constipation: No  Diarrhea:  No  Blood in stool: No  Black stools: No  Rectal or Anal pain: No  Fecal incontinence: No  Yellowing of skin or eyes: No  Vomit with blood: No  Change in stools: No  Back pain: No  Muscle aches: No  Neck pain: No  Swollen joints: No  Joint pain: Yes  Bone pain: No  Muscle cramps: No  Muscle weakness: No  Joint stiffness: Yes  Bone fracture: No

## 2018-09-25 NOTE — MR AVS SNAPSHOT
After Visit Summary   9/25/2018    Emil Garcia    MRN: [id number]           Patient Information     Date Of Birth          1990        Visit Information        Provider Department      9/25/2018 7:30 AM Jim Patten MD [facility]        Today's Diagnoses     Crohn's disease (H)    -  1      Care Instructions    Stop Budesonide today      Schedule mre   You are scheduled on October 4  Check in time is 7 am  Nothing to eat or drink for 6 hours  [address]   Room 1-327         Dr. Patten will discuss with Dr. Dorado transitioning care to Dr. Dorado    For questions regarding your care Monday through Friday, contact the RN GI care coordinator,  Call   [phone] . Your call will be  returned same day, or if consultation is needed with the provider, it may be following business day - or you may send a My Chart message.    For medication refills (prescribed by the GI clinic), contact your pharmacy.    For appointment rescheduling/cancellation, contact [phone]     After hours, or if you have an immediate GI concern and cannot wait for a return call, contact the GI Fellow at [phone] and select option #4.              Follow-ups after your visit        Your next 10 appointments already scheduled     Sep 28, 2018 12:00 PM CDT   Infusion 120 with UC SPEC INFUSION, UC 50 ATC   [facility] ([facility])    [address] [place] [address]  [address]  [facility] [address] [phone]            Oct 01, 2018  1:00 PM CDT   (Arrive by 12:45 PM)   New Patient Visit with Elvia Mix, PhD   [facility] ([facility])    [address] [place] [address]  4th Floor  [facility] [id number] [phone]            Oct 04, 2018  8:00 AM CDT   MR ENTEROGRAPHY W CONTRAST with UUMR1   [facility] [place], MRI ([facility]  [facility])    [address] [address]   [phone]           How do I prepare for my exam? (Food and drink instructions) Do not eat or drink for 6 hours before the exam. If you need to take medicine, you may take it with small sips of water. (We may ask you to take liquid medicine as well.)  How do I prepare for my exam? (Other instructions) Take your medicines as usual, unless your doctor tells you not to. You may or may not receive intravenous (IV) contrast for this exam pending the discretion of the Radiologist. You will be asked to drink oral contrast for this exam. You will be given the oral contrast in MRI prior to your exam. Please arrive 60-90 minutes before your exam time to drink the oral contrast.  What should I wear: The MRI machine uses a strong magnet. Please wear clothes without metal (snaps, zippers). A sweatsuit works well, or we may give you a hospital gown. Please remove any body piercings and hair extensions before you arrive. You will also remove watches, jewelry, hairpins, wallets, dentures, partial dental plates and hearing aids. You may wear contact lenses, and you may be able to wear your rings. We have a safe place to keep your personal items, but it is safer to leave them at home.  How long does the exam take: Most tests take 30 to 60 minutes.  What should I bring: Bring a list of your current medicines to your exam (including vitamins, minerals and over-the-counter drugs).  Do I need a :  No  is needed.  What should I do after the exam: No Restrictions, You may resume normal activities.  What is this test: MRI (magnetic resonance imaging) uses a strong magnet and radio waves to look inside the body. An MRA (magnetic resonance angiogram) does the same thing, but it lets us look at your blood vessels. A computer turns the radio waves into pictures showing cross sections of the body, much like slices of bread. This helps us  see any problems more clearly. You may receive fluid (called  contrast ) before or during your scan. The fluid helps us see the pictures better. We give the fluid through an IV (small needle in your arm).  Who should I call with questions: If you have any questions, please contact your Imaging Department exam site. Directions, parking instructions, and other information is available on our website, [email or web address].            Oct 15, 2018  3:30 PM CDT   (Arrive by 3:15 PM)   Return Nutrition with Yohan Daly RD   [facility] ([facility])    [address] [place] [address]  4th Floor  [facility] [address] [phone]            Oct 26, 2018 12:00 PM CDT   Infusion 120 with UC SPEC INFUSION, UC 50 ATC   [facility] Specialty and Procedure ([facility])    [address]  [address]  [facility] [address] [phone]            Nov 30, 2018 12:00 PM CST   Infusion 120 with UC SPEC INFUSION, UC 50 ATC   [facility] Special[facility] Procedure ([facility])    [address]  [address]  [facility] [address] [phone]            Dec 28, 2018 12:00 PM CST   Infusion 120 with UC SPEC INFUSION   [facility] Special[facility] Procedure ([facility])    [address]  [address]  [facility] [address] [phone]              Future tests that were ordered for you today     Open Future Orders        Priority Expected Expires Ordered    MR Enterography w Contrast Routine  9/25/2019 9/25/2018            Who to contact     Please call your clinic at [phone] to:    Ask questions about your health    Make or cancel appointments    Discuss your medicines    Learn about your test results    Speak to your doctor            Additional Information About Your Visit        MyChart Information     MyChart  "gives you secure access to your electronic health record. If you see a primary care provider, you can also send messages to your care team and make appointments. If you have questions, please call your primary care clinic.  If you do not have a primary care provider, please call [phone] and they will assist you.      eZelleron is an electronic gateway that provides easy, online access to your medical records. With eZelleron, you can request a clinic appointment, read your test results, renew a prescription or communicate with your care team.     To access your existing account, please contact your [facility] or call [phone] for assistance.        Care EveryWhere ID     This is your Care EveryWhere ID. This could be used by other organizations to access your [place] medical records  [id number]        Your Vitals Were     Pulse Temperature Height Pulse Oximetry BMI (Body Mass Index)       71 97.8  F (36.6  C) (Oral) 1.651 m (5' 5\") 100% 18.75 kg/m2        Blood Pressure from Last 3 Encounters:   09/25/18 108/74   08/31/18 100/67   08/03/18 112/73    Weight from Last 3 Encounters:   09/25/18 51.1 kg (112 lb 11.2 oz)   09/17/18 50.8 kg (112 lb)   08/31/18 51.9 kg (114 lb 6.4 oz)               Primary Care Provider Fax #    Physician No Ref-Primary [phone]       No address on file        Equal Access to Services     HAYLEE ALANIS : Hadii aad ku hadasho Soomaali, waaxda luqadaha, qaybta kaalmada adeegyada, beatris arteaga . So [facility] [phone].    ATENCIÓN: Si habla español, tiene a perkins disposición servicios gratuitos de asistencia lingüística. Richard al [phone].    We comply with applicable federal civil rights laws and [place] laws. We do not discriminate on the basis of race, color, national origin, age, disability, sex, sexual orientation, or gender identity.            Thank you!     Thank you for choosing [facility] " CLINIC  for your care. Our goal is always to provide you with excellent care. Hearing back from our patients is one way we can continue to improve our services. Please take a few minutes to complete the written survey that you may receive in the mail after your visit with us. Thank you!             Your Updated Medication List - Protect others around you: Learn how to safely use, store and throw away your medicines at www.disposemymeds.org.          This list is accurate as of 9/25/18  7:56 AM.  Always use your most recent med list.                   Brand Name Dispense Instructions for use Diagnosis    BENADRYL PO      Take 50 mg by mouth as needed        budesonide 3 MG EC capsule    ENTOCORT EC    270 capsule    Take 3 capsules (9 mg) by mouth every morning    Crohn's disease of large intestine without complication (H)       dicyclomine 10 MG capsule    BENTYL    120 capsule    Take of capsule (10mg) two times a day and increase to 4 times a day as needed.    Crohn's disease (H)       inFLIXimab 100 MG injection    REMICADE     100 mg IV every 8 weeks        mesalamine 1.2 g EC tablet    LIALDA    120 tablet    Take 4 tablets (4,800 mg) by mouth daily    Crohn's disease (H)       MULTIVITAL PO      Take  by mouth.

## 2018-09-28 ENCOUNTER — INFUSION THERAPY VISIT (OUTPATIENT)
Dept: INFUSION THERAPY | Facility: CLINIC | Age: 28
End: 2018-09-28
Attending: INTERNAL MEDICINE
Payer: COMMERCIAL

## 2018-09-28 VITALS
SYSTOLIC BLOOD PRESSURE: 112 MMHG | WEIGHT: 112.4 LBS | OXYGEN SATURATION: 100 % | TEMPERATURE: 97.7 F | HEART RATE: 89 BPM | BODY MASS INDEX: 18.7 KG/M2 | DIASTOLIC BLOOD PRESSURE: 75 MMHG | RESPIRATION RATE: 16 BRPM

## 2018-09-28 DIAGNOSIS — K50.90 CROHN'S DISEASE WITHOUT COMPLICATION, UNSPECIFIED GASTROINTESTINAL TRACT LOCATION (H): Primary | ICD-10-CM

## 2018-09-28 PROCEDURE — 96409 CHEMO IV PUSH SNGL DRUG: CPT

## 2018-09-28 PROCEDURE — 25000128 H RX IP 250 OP 636: Mod: ZF | Performed by: INTERNAL MEDICINE

## 2018-09-28 RX ORDER — METHYLPREDNISOLONE SODIUM SUCCINATE 125 MG/2ML
125 INJECTION, POWDER, LYOPHILIZED, FOR SOLUTION INTRAMUSCULAR; INTRAVENOUS ONCE
Status: CANCELLED | OUTPATIENT
Start: 2018-09-28 | End: 2018-09-28

## 2018-09-28 RX ORDER — ACETAMINOPHEN 325 MG/1
650 TABLET ORAL ONCE
Status: CANCELLED
Start: 2018-09-28 | End: 2018-09-28

## 2018-09-28 RX ORDER — DIPHENHYDRAMINE HCL 25 MG
25 CAPSULE ORAL ONCE
Status: CANCELLED
Start: 2018-09-28 | End: 2018-09-28

## 2018-09-28 RX ADMIN — INFLIXIMAB 500 MG: 100 INJECTION, POWDER, LYOPHILIZED, FOR SOLUTION INTRAVENOUS at 12:45

## 2018-09-28 NOTE — MR AVS SNAPSHOT
After Visit Summary   9/28/2018    Emil Garcia    MRN: [id number]           Patient Information     Date Of Birth          1990        Visit Information        Provider Department      9/28/2018 12:00 PM UC 50 ATC; UC SPEC [facility] Specialty [facility] Procedure        Today's Diagnoses     Crohn's disease without complication, unspecified gastrointestinal tract location (H)    -  1       Follow-ups after your visit        Your next 10 appointments already scheduled     Oct 04, 2018  8:00 AM CDT   (Arrive by 7:00 AM)   MR ENTEROGRAPHY WWO CONTRAST with UUMR1   [facility] [place], MRI ([facility])    [address] [address] [phone]           How do I prepare for my exam? (Food and drink instructions) Do not eat or drink for 6 hours before the exam. If you need to take medicine, you may take it with small sips of water. (We may ask you to take liquid medicine as well.)  How do I prepare for my exam? (Other instructions) Take your medicines as usual, unless your doctor tells you not to. You may or may not receive intravenous (IV) contrast for this exam pending the discretion of the Radiologist. You will be asked to drink oral contrast for this exam. You will be given the oral contrast in MRI prior to your exam. Please arrive 60-90 minutes before your exam time to drink the oral contrast.  What should I wear: The MRI machine uses a strong magnet. Please wear clothes without metal (snaps, zippers). A sweatsuit works well, or we may give you a hospital gown. Please remove any body piercings and hair extensions before you arrive. You will also remove watches, jewelry, hairpins, wallets, dentures, partial dental plates and hearing aids. You may wear contact lenses, and you may be able to wear your rings. We have a safe place to keep your personal items, but it is safer to leave them at home.  How  long does the exam take: Most tests take 30 to 60 minutes.  What should I bring: Bring a list of your current medicines to your exam (including vitamins, minerals and over-the-counter drugs).  Do I need a :  No  is needed.  What should I do after the exam: No Restrictions, You may resume normal activities.  What is this test: MRI (magnetic resonance imaging) uses a strong magnet and radio waves to look inside the body. An MRA (magnetic resonance angiogram) does the same thing, but it lets us look at your blood vessels. A computer turns the radio waves into pictures showing cross sections of the body, much like slices of bread. This helps us see any problems more clearly. You may receive fluid (called  contrast ) before or during your scan. The fluid helps us see the pictures better. We give the fluid through an IV (small needle in your arm).  Who should I call with questions: If you have any questions, please contact your Imaging Department exam site. Directions, parking instructions, and other information is available on our website, [email or web address].            Oct 15, 2018  3:30 PM CDT   (Arrive by 3:15 PM)   Return Nutrition with Yohan Daly RD   [facility] ([facility])    [address] [place] [address]  4th Floor  [facility] [address]   [phone]            Oct 26, 2018 12:00 PM CDT   Infusion 120 with UC SPEC INFUSION, UC 50 ATC   [facility] Specialty and Procedure ([facility])    [address]  [address]  [facility] [address]   [phone]            Nov 30, 2018 12:00 PM CST   Infusion 120 with UC SPEC INFUSION, UC 50 ATC   [facility] Specialty and Procedure ([facility])    [address]  [address]  [facility] [address]   [phone]            Dec 28, 2018 12:00 PM CST   Infusion 120 with UC SPEC INFUSION, UC 50  ATC   [facility] Specialty and Procedure ([facility])    [address]  [address]  [facility] [address] [phone]              Who to contact     If you have questions or need follow up information about today's clinic visit or your schedule please contact [facility] SPECIALTY AND PROCEDURE directly at [phone].  Normal or non-critical lab and imaging results will be communicated to you by Blurbhart, letter or phone within 4 business days after the clinic has received the results. If you do not hear from us within 7 days, please contact the clinic through Blurbhart or phone. If you have a critical or abnormal lab result, we will notify you by phone as soon as possible.  Submit refill requests through [employer] or call your pharmacy and they will forward the refill request to us. Please allow 3 business days for your refill to be completed.          Additional Information About Your Visit        Blurbhart Information     [employer] gives you secure access to your electronic health record. If you see a primary care provider, you can also send messages to your care team and make appointments. If you have questions, please call your primary care clinic.  If you do not have a primary care provider, please call [phone] and they will assist you.        Care EveryWhere ID     This is your Care EveryWhere ID. This could be used by other organizations to access your [place] medical records  [id number]        Your Vitals Were     Pulse Temperature Respirations Pulse Oximetry BMI (Body Mass Index)       89 97.7  F (36.5  C) (Oral) 16 100% 18.7 kg/m2        Blood Pressure from Last 3 Encounters:   09/28/18 112/75   09/25/18 108/74   08/31/18 100/67    Weight from Last 3 Encounters:   09/28/18 51 kg (112 lb 6.4 oz)   09/25/18 51.1 kg (112 lb 11.2 oz)   09/17/18 50.8 kg (112 lb)              Today, you had the following     No orders found  for display       Primary Care Provider Fax #    Physician No Ref-Primary [phone]       No address on file        Equal Access to Services     HAYLEE ALANIS : Hadmahogany aad ku hadjazmyn Pereira, cecil cheriamerica, batsheva fried, beatris reidin hayaasusan pfeifferolga storey laDeenarei callaway. So [facility] [phone].    ATENCIÓN: Si habla español, tiene a perkins disposición servicios gratuitos de asistencia lingüística. Llame al [phone].    We comply with applicable federal civil rights laws and [place] laws. We do not discriminate on the basis of race, color, national origin, age, disability, sex, sexual orientation, or gender identity.            Thank you!     Thank you for choosing [facility] SPECIALTY AND PROCEDURE  for your care. Our goal is always to provide you with excellent care. Hearing back from our patients is one way we can continue to improve our services. Please take a few minutes to complete the written survey that you may receive in the mail after your visit with us. Thank you!             Your Updated Medication List - Protect others around you: Learn how to safely use, store and throw away your medicines at www.disposemymeds.org.          This list is accurate as of 9/28/18 11:59 PM.  Always use your most recent med list.                   Brand Name Dispense Instructions for use Diagnosis    BENADRYL PO      Take 50 mg by mouth as needed        budesonide 3 MG EC capsule    ENTOCORT EC    270 capsule    Take 3 capsules (9 mg) by mouth every morning    Crohn's disease of large intestine without complication (H)       dicyclomine 10 MG capsule    BENTYL    120 capsule    Take of capsule (10mg) two times a day and increase to 4 times a day as needed.    Crohn's disease (H)       inFLIXimab 100 MG injection    REMICADE     100 mg IV every 8 weeks        mesalamine 1.2 g EC tablet    LIALDA    120 tablet    Take 4 tablets (4,800 mg) by mouth daily    Crohn's disease (H)       MULTIVITAL PO       Take  by mouth.

## 2018-09-28 NOTE — PROGRESS NOTES
Nursing Note  Emil [facility]:   Chief Complaint   Patient presents with     Infusion     Remicade     During today's [facility] appointment, orders from Dr. Jim Patten were completed.  Frequency: monthly    Progress note:  Patient identification verified by name and date of birth.  Assessment completed.  Vitals recorded in Doc Flowsheets.  Patient was provided with education regarding infusion and possible side effects.  Patient verbalized understanding.      needed: No  Premedications: declined due to pt preference. Pt prefers to get infusion over 2 hours and not take premeds.  Infusion Rates: Remicade given over approximately 2 hours.  Approximate Infusion length:2 hours.   Labs: were not ordered for this appointment.  Vascular access: peripheral IV placed today.  Treatment Conditions:   ~~~ NOTE: If the patient answers yes to any of the questions below, hold the infusion and contact ordering provider or on-call provider.    1. Have you recently had an elevated temperature, fever, chills, productive cough, coughing for 3 weeks or longer or hemoptysis,  abnormal vital signs, night sweats,  chest pain or have you noticed a decrease in your appetite, unexplained weight loss or fatigue? No  2. Do you have any open wounds or new incisions? No  3. Do you have any recent or upcoming hospitalizations, surgeries or dental procedures? No  4. Do you currently have or recently have had any signs of illness or infection or are you on any antibiotics? No  5. Have you had any new, sudden or worsening abdominal pain? No. Pt reports his abd pain is consistent with the abd pain he has been chronically experiencing.  6. Have you or anyone in your household received a live vaccination in the past 4 weeks? Please note:  No live vaccines while on biologic/chemotherapy until 6 months after the last treatment.  Patient can receive the  flu vaccine (shot only) and the pneumovax.  It is optimal for the patient to get these vaccines mid cycle, but they can be given at any time as long as it is not on the day of the infusion. No  7. Have you recently been diagnosed with any new nervous system diseases (ie. Multiple sclerosis, Guillain [place], seizures, neurological changes) or cancer diagnosis? Are you on any form of radiation or chemotherapy? No  8. Are you pregnant or breast feeding or do you have plans of pregnancy in the future? No  9. Have you been having any signs of worsening depression or suicidal ideations?  (benlysta only) No  10. Have there been any other new onset medical symptoms? No    Patient tolerated infusion: well.    Drug Waste Record? No     Discharge Plan:   Follow up plan of care with: ongoing infusions [facility]. and primary medical doctor.  Discharge instructions were reviewed with patient.  Patient/representative verbalized understanding of discharge instructions and all questions answered.  Patient discharged from [facility] in stable condition.    Daina Cerrato RN       Administrations This Visit     inFLIXimab (REMICADE) 500 mg in sodium chloride 0.9 % 325 mL infusion     Admin Date Action Dose Rate Route Administered By          09/28/2018 New Bag 500 mg 162.5 mL/hr Intravenous Daina Cerrato RN                           /81  Pulse 88  Temp 97.7  F (36.5  C) (Oral)  Resp 16  Wt 51 kg (112 lb 6.4 oz)  SpO2 100%  BMI 18.7 kg/m2

## 2018-10-04 ENCOUNTER — HOSPITAL ENCOUNTER (OUTPATIENT)
Dept: MRI IMAGING | Facility: CLINIC | Age: 28
Discharge: HOME | End: 2018-10-04
Attending: INTERNAL MEDICINE | Admitting: INTERNAL MEDICINE
Payer: COMMERCIAL

## 2018-10-04 DIAGNOSIS — K50.10 CROHN'S DISEASE OF LARGE INTESTINE WITHOUT COMPLICATION (H): ICD-10-CM

## 2018-10-04 PROCEDURE — 72197 MRI PELVIS W/O & W/DYE: CPT

## 2018-10-04 PROCEDURE — 25500064 ZZH RX 255 OP 636: Performed by: INTERNAL MEDICINE

## 2018-10-04 PROCEDURE — A9585 GADOBUTROL INJECTION: HCPCS | Performed by: INTERNAL MEDICINE

## 2018-10-04 PROCEDURE — 25000128 H RX IP 250 OP 636: Performed by: INTERNAL MEDICINE

## 2018-10-04 RX ORDER — GADOBUTROL 604.72 MG/ML
7.5 INJECTION INTRAVENOUS ONCE
Status: COMPLETED | OUTPATIENT
Start: 2018-10-04 | End: 2018-10-04

## 2018-10-04 RX ADMIN — GLUCAGON HYDROCHLORIDE 1 MG: KIT at 08:49

## 2018-10-04 RX ADMIN — GADOBUTROL 5 ML: 604.72 INJECTION INTRAVENOUS at 09:25

## 2018-10-15 ENCOUNTER — OFFICE VISIT (OUTPATIENT)
Dept: GASTROENTEROLOGY | Facility: CLINIC | Age: 28
End: 2018-10-15
Payer: COMMERCIAL

## 2018-10-15 VITALS — WEIGHT: 112.4 LBS | BODY MASS INDEX: 18.7 KG/M2

## 2018-10-15 DIAGNOSIS — K50.90 CROHN'S DISEASE (H): Primary | ICD-10-CM

## 2018-10-15 DIAGNOSIS — Z71.3 NUTRITIONAL COUNSELING: ICD-10-CM

## 2018-10-15 NOTE — MR AVS SNAPSHOT
After Visit Summary   10/15/2018    Emil Garcia    MRN: [id number]           Patient Information     Date Of Birth          1990        Visit Information        Provider Department      10/15/2018 3:30 PM Yohan Daly RD [facility]        Care Instructions    Hi Mr. Garcia,    It was great meeting with you again today. Below is a summary of what we discussed:    1. Regarding constipation - Try to increase fiber. Transition from rice cereal to oatmeal over a couple days. Start with 50% rice cereal and 50% oatmeal for a couple days and then transition to 100% brown rice. Also try to increase vegetable intake. Start with cooked vegetables (steamed, boiled, stir fried). Perhaps incorporate fiber containing starch for lunch (e.g. brown rice).    2. Continue to focus on maintaining caloric intake. Goal is continued weight maintenance with eventual weight gain.    Best regards,  Yohan Daly, PhD, RD            Follow-ups after your visit        Follow-up notes from your care team     Return in about 4 weeks (around 11/12/2018).      Your next 10 appointments already scheduled     Oct 26, 2018 12:00 PM CDT   Infusion 120 with UC SPEC INFUSION, UC 50 ATC   [facility] ([facility] [place])    [address]  [address]  [facility] [id number] [phone]            Nov 30, 2018 12:00 PM CST   Infusion 120 with UC SPEC INFUSION, UC 50 ATC   [facility] ([facility] [place])    [address]  [address]  [facility] [id number] [phone]            Dec 28, 2018 12:00 PM CST   Infusion 120 with UC SPEC INFUSION, UC 50 ATC   [facility] ([facility] [place])    [address]  [address]  [facility] [id number] [phone]              Who to contact      Please call your clinic at [phone] to:    Ask questions about your health    Make or cancel appointments    Discuss your medicines    Learn about your test results    Speak to your doctor            Additional Information About Your Visit        "[employer]har[employer]BC[employer] Information     [employer] gives you secure access to your electronic health record. If you see a primary care provider, you can also send messages to your care team and make appointments. If you have questions, please call your primary care clinic.  If you do not have a primary care provider, please call [phone] and they will assist you.      [employer] is an electronic gateway that provides easy, online access to your medical records. With [employer] you can request a clinic appointment, read your test results, renew a prescription or communicate with your care team.     To access your existing account, please contact your [facility] or call [phone] for assistance.        Care EveryWhere ID     This is your Care EveryWhere ID. This could be used by other organizations to access your [facility] medical records  [id number]         Blood Pressure from Last 3 Encounters:   09/28/18 112/75   09/25/18 108/74   08/31/18 100/67    Weight from Last 3 Encounters:   09/28/18 51 kg (112 lb 6.4 oz)   09/25/18 51.1 kg (112 lb 11.2 oz)   09/17/18 50.8 kg (112 lb)              Today, you had the following     No orders found for display       Primary Care Provider Fax #    Physician No Ref-Primary [phone]       No address on file        Equal Access to Services     HAYLEE ALANIS : Hadii dax Pereira, waaxda luqadaha, qaybta kaalmada donovanyada, beatris arteaga . So [facility] [phone].    ATENCIÓN: Si habla español, tiene a perkins disposición servicios gratuitos de asistencia lingüística. Llame al [phone].    We comply with applicable federal civil rights laws and [place] laws. We do not discriminate on  the basis of race, color, national origin, age, disability, sex, sexual orientation, or gender identity.            Thank you!     Thank you for choosing [facility]  for your care. Our goal is always to provide you with excellent care. Hearing back from our patients is one way we can continue to improve our services. Please take a few minutes to complete the written survey that you may receive in the mail after your visit with us. Thank you!             Your Updated Medication List - Protect others around you: Learn how to safely use, store and throw away your medicines at www.disposemymeds.org.          This list is accurate as of 10/15/18  4:12 PM.  Always use your most recent med list.                   Brand Name Dispense Instructions for use Diagnosis    BENADRYL PO      Take 50 mg by mouth as needed        budesonide 3 MG EC capsule    ENTOCORT EC    270 capsule    Take 3 capsules (9 mg) by mouth every morning    Crohn's disease of large intestine without complication (H)       dicyclomine 10 MG capsule    BENTYL    120 capsule    Take of capsule (10mg) two times a day and increase to 4 times a day as needed.    Crohn's disease (H)       inFLIXimab 100 MG injection    REMICADE     100 mg IV every 8 weeks        mesalamine 1.2 g EC tablet    LIALDA    120 tablet    Take 4 tablets (4,800 mg) by mouth daily    Crohn's disease (H)       MULTIVITAL PO      Take  by mouth.

## 2018-10-15 NOTE — LETTER
10/15/2018       RE: Emil Garcia  [address] [address]     Dear Colleague,    Thank you for referring your patient, Emil Garcia, to the [facility] at [facility]. Please see a copy of my visit note below.    [facility] Therapy      Time Spent:  45 minutes  Session Type: Follow-up  Referring Physician: Dr. Jim Patten  Reason for RD Visit: IBD     Nutrition Plan: Continue to increase variety of diet working to increase caloric intake to eventually gain weight - encouraged by recent intake allowing for weight maintenance. Work to increase sources of fiber in the diet in order to aid with constipation. Continue daily multivitamin.    Recommendations for MD/Provider to order: None at this time    Malnutrition Diagnosis: Non-Severe malnutrition  In Context of:  Chronic illness or disease     Nutrition Assessment:  Patient is here for intial visit with Registered Dietitian (RD).  Patient is a 27 year old male with history of Crohn's disease diagnosed in 2014. He has experienced substantial weight loss since February of this year (~25%; 67.4 kg down to 50.9 kg) in large part due to diet restriction following a flare in May of this year. Mr. Garcia had limited his intake to essentially rice/potatoes since that time and had been afraid to begin reintroducing foods due to concerns regarding recovery from flare. Since our last visit 9/17, Mr. Garcia has worked to increase both variety of diet and caloric intake. He has been able to consume enough calories to maintain weight and continues to work to increasing calories for weight regain. Although improved since last visit, he continues to struggle with bloating and constipation.     Symptom Review  1. Nausea/vomiting? No  2. Heartburn? Yes: Irregular (1x/wk) (improved from 2x/wk)  3. Bloating? Yes: Improved. Still daily, but less frequent severe episodes during  "the day (was every 20-30 minutes, now 2-3x/day).  4. Belching? Yes: Daily  5. Feeling full quickly? Yes: Eating heavier foods, but still feels like getting full quickly  6. Decreased appetite? Yes: Feels like it decreased a bit, but also notes that he added a morning snack due to increase sensation of hunger.  7. Weight loss/gain? Yes: Previously, but recently stable  8. Constipation/Diarrhea? Yes: Constipation  9. Abdominal pain/pain with or without eating? Has gotten a lot better, but still some  10. Feeling tired? Has improved in last week (recent development)    Diet Recall:  (Typical Day)  Meal Name Time Food    Breakfast 6:45 a.m. Rice cereal          8:00-9:00 a.m. Starts to feel hungry and frito lays chips (single serving bag (~2 ounces) and Reeses (4 cups)         10:00 a.m. Rice Milk (8 ounces)        Lunch 12:00 p.m. Some kind of starch that is gluten free and two fried eggs + soy sauce         2:00 p.m. Ensure and banana        Dinner 6:00 p.m. Rice congee (sometimes salmon and sometimes small amount of stir fried meats)        Snacks  Strawberry banana smoothie (strawberries/banana/honey) if doesn't have rice milk   Beverages  Water throughout the day   Alcohol   No   *Also taking daily multivitamin and vitamin D  Symptoms:   Bloating: Experiences severe bloating 1-2x daily (was every 20-30 minutes around last time we met). Usually after meals.    Frequency of eating/taking out meals: No  Food access/availability: Fine  Food preparation confidence/abilities: Lives with parents and they prepare meals    Anthropometrics:   Estimated body mass index is 18.7 kg/(m^2) as calculated from the following:  Height as of 9/25/18: 1.651 m (5' 5\").  Weight as of 10/15/18: 51 kg (112 lb 6.4 oz).    Usual Weight: 140 pounds  Weight change in past 6 months: 25% weight loss  Weight History:  Wt Readings from Last 10 Encounters:   10/15/18 51 kg (112 lb 6.4 oz)   09/28/18 51 kg (112 lb 6.4 oz)   09/25/18 51.1 kg (112 lb " 11.2 oz)   18 50.8 kg (112 lb)   18 51.9 kg (114 lb 6.4 oz)   18 53.6 kg (118 lb 3.2 oz)   07/10/18 54.5 kg (120 lb 3.2 oz)   18 56.6 kg (124 lb 11.2 oz)   18 60.7 kg (133 lb 12.8 oz)   18 64.7 kg (142 lb 9.6 oz)     Labs:  Reviewed. No new labs since last visit.  Pertinent Medications/vitamin and mineral supplements:   Outpatient Encounter Prescriptions as of 10/15/2018   Medication Sig Dispense Refill     budesonide (ENTOCORT EC) 3 MG EC capsule Take 3 capsules (9 mg) by mouth every morning 270 capsule 1     dicyclomine (BENTYL) 10 MG capsule Take of capsule (10mg) two times a day and increase to 4 times a day as needed. 120 capsule 3     DiphenhydrAMINE HCl (BENADRYL PO) Take 50 mg by mouth as needed        inFLIXimab (REMICADE) 100 MG injection 100 mg IV every 8 weeks       mesalamine (LIALDA) 1.2 g EC tablet Take 4 tablets (4,800 mg) by mouth daily 120 tablet 3     Multiple Vitamins-Minerals (MULTIVITAL PO) Take  by mouth.       No facility-administered encounter medications on file as of 10/15/2018.        Food Allergies: None  Food Intolerances: Believes he may be lactose intollerant  Physical Activity: N/A  Estimated Nutrition Needs based on current body weight of 51 [id number] calories (25-35 kcals/kg), 50 g protein (1 g/kg), 1500 ml fluids (~1 ml/kcal or total fluids per MD).     MALNUTRITION:  % Weight Loss:  > 10% in 6 months (severe malnutrition)  % Intake:  <75% for > 7 days (non-severe malnutrition)  Subcutaneous Fat Loss:  None observed  Muscle Loss:  None observed  Fluid Retention:  None noted    Nutrition Diagnosis:    Food and nutrition related knowledge deficit related to Crohn's disease  as evidenced by need for diet education.    Nutrition Prescription: General diet    Nutrition Intervention:    Nutrition Education/Counseling:  Discussed current diet and symptoms. Patient has been able to maintain his weight with liberalization of diet following last  visit. His symptoms are improving, but he frustrated by the slow progress. Although symptoms of bloating and reflux have improved somewhat, they still aren't where he would like them to be. We discussed how encouraging it is that weight has been stable. Curbing weight loss was the primary focus of the previous visit and he accomplished this goal. He would like to address constipation next. Discussed current diet and noted that it was extremely low in fiber. Primary goal for next visit will be to increase intake of fiber. Will also continue to focus on weight maintenance/gain. Overall symptoms seem to be improving, albeit slowly. Discussed that this will be a process and provided encouragement to continue working to improve dietary intake.     Educational Materials Provided:  None provided during this visit  Patient verbalized understanding of education provided. See all recommendations under Goals.    Goals:  1. Regarding constipation - Try to increase fiber. Transition from rice cereal to oatmeal over a couple days. Start with 50% rice cereal and 50% oatmeal for a couple days and then transition to 100% brown rice. Also try to increase vegetable intake. Start with cooked vegetables (steamed, boiled, stir fried). Perhaps incorporate fiber containing starch for lunch (e.g. brown rice).    2. Continue to focus on maintaining caloric intake. Goal is continued weight maintenance with eventual weight gain.    Nutrition Monitoring and Evaluation: Will monitor adherence to nutrition recommendations at future RD visits.     Further Medical Nutrition Therapy:  Yes  Next Appointment (if applicable):  4 weeks  Patient was encouraged to call/contact RD with any further questions.      Again, thank you for allowing me to participate in the care of your patient.      Sincerely,    Yohan Daly RD

## 2018-10-15 NOTE — PATIENT INSTRUCTIONS
Hi Mr. Garcia,    It was great meeting with you again today. Below is a summary of what we discussed:    1. Regarding constipation - Try to increase fiber. Transition from rice cereal to oatmeal over a couple days. Start with 50% rice cereal and 50% oatmeal for a couple days and then transition to 100% brown rice. Also try to increase vegetable intake. Start with cooked vegetables (steamed, boiled, stir fried). Perhaps incorporate fiber containing starch for lunch (e.g. brown rice).    2. Continue to focus on maintaining caloric intake. Goal is continued weight maintenance with eventual weight gain.    Best regards,  Yohan Daly, PhD, RD

## 2018-10-15 NOTE — PROGRESS NOTES
[facility]patient Medical Nutrition Therapy      Time Spent:  45 minutes  Session Type: Follow-up  Referring Physician: Dr. Jim Patten  Reason for RD Visit: IBD     Nutrition Plan: Continue to increase variety of diet working to increase caloric intake to eventually gain weight - encouraged by recent intake allowing for weight maintenance. Work to increase sources of fiber in the diet in order to aid with constipation. Continue daily multivitamin.    Recommendations for MD/Provider to order: None at this time    Malnutrition Diagnosis: Non-Severe malnutrition  In Context of:  Chronic illness or disease     Nutrition Assessment:  Patient is here for intial visit with Registered Dietitian (RD).  Patient is a 27 year old male with history of Crohn's disease diagnosed in 2014. He has experienced substantial weight loss since February of this year (~25%; 67.4 kg down to 50.9 kg) in large part due to diet restriction following a flare in May of this year. Mr. Garcia had limited his intake to essentially rice/potatoes since that time and had been afraid to begin reintroducing foods due to concerns regarding recovery from flare. Since our last visit 9/17, Mr. Garcia has worked to increase both variety of diet and caloric intake. He has been able to consume enough calories to maintain weight and continues to work to increasing calories for weight regain. Although improved since last visit, he continues to struggle with bloating and constipation.     Symptom Review  1. Nausea/vomiting? No  2. Heartburn? Yes: Irregular (1x/wk) (improved from 2x/wk)  3. Bloating? Yes: Improved. Still daily, but less frequent severe episodes during the day (was every 20-30 minutes, now 2-3x/day).  4. Belching? Yes: Daily  5. Feeling full quickly? Yes: Eating heavier foods, but still feels like getting full quickly  6. Decreased appetite? Yes: Feels like it decreased a bit, but also notes that he added a morning snack due to increase sensation of  "hunger.  7. Weight loss/gain? Yes: Previously, but recently stable  8. Constipation/Diarrhea? Yes: Constipation  9. Abdominal pain/pain with or without eating? Has gotten a lot better, but still some  10. Feeling tired? Has improved in last week (recent development)    Diet Recall:  (Typical Day)  Meal Name Time Food    Breakfast 6:45 a.m. Rice cereal          8:00-9:00 a.m. Starts to feel hungry and frito lays chips (single serving bag (~2 ounces) and Reeses (4 cups)         10:00 a.m. Rice Milk (8 ounces)        Lunch 12:00 p.m. Some kind of starch that is gluten free and two fried eggs + soy sauce         2:00 p.m. Ensure and banana        Dinner 6:00 p.m. Rice congee (sometimes salmon and sometimes small amount of stir fried meats)        Snacks  Strawberry banana smoothie (strawberries/banana/honey) if doesn't have rice milk   Beverages  Water throughout the day   Alcohol   No   *Also taking daily multivitamin and vitamin D  Symptoms:   Bloating: Experiences severe bloating 1-2x daily (was every 20-30 minutes around last time we met). Usually after meals.    Frequency of eating/taking out meals: No  Food access/availability: Fine  Food preparation confidence/abilities: Lives with parents and they prepare meals    Anthropometrics:   Estimated body mass index is 18.7 kg/(m^2) as calculated from the following:  Height as of 9/25/18: 1.651 m (5' 5\").  Weight as of 10/15/18: 51 kg (112 lb 6.4 oz).    Usual Weight: 140 pounds  Weight change in past 6 months: 25% weight loss  Weight History:  Wt Readings from Last 10 Encounters:   10/15/18 51 kg (112 lb 6.4 oz)   09/28/18 51 kg (112 lb 6.4 oz)   09/25/18 51.1 kg (112 lb 11.2 oz)   09/17/18 50.8 kg (112 lb)   08/31/18 51.9 kg (114 lb 6.4 oz)   08/03/18 53.6 kg (118 lb 3.2 oz)   07/10/18 54.5 kg (120 lb 3.2 oz)   06/08/18 56.6 kg (124 lb 11.2 oz)   05/04/18 60.7 kg (133 lb 12.8 oz)   04/13/18 64.7 kg (142 lb 9.6 oz)     Labs:  Reviewed. No new labs since last " visit.  Pertinent Medications/vitamin and mineral supplements:   Outpatient Encounter Prescriptions as of 10/15/2018   Medication Sig Dispense Refill     budesonide (ENTOCORT EC) 3 MG EC capsule Take 3 capsules (9 mg) by mouth every morning 270 capsule 1     dicyclomine (BENTYL) 10 MG capsule Take of capsule (10mg) two times a day and increase to 4 times a day as needed. 120 capsule 3     DiphenhydrAMINE HCl (BENADRYL PO) Take 50 mg by mouth as needed        inFLIXimab (REMICADE) 100 MG injection 100 mg IV every 8 weeks       mesalamine (LIALDA) 1.2 g EC tablet Take 4 tablets (4,800 mg) by mouth daily 120 tablet 3     Multiple Vitamins-Minerals (MULTIVITAL PO) Take  by mouth.       No facility-administered encounter medications on file as of 10/15/2018.        Food Allergies: None  Food Intolerances: Believes he may be lactose intollerant  Physical Activity: N/A  Estimated Nutrition Needs based on current body weight of 51 [id number] calories (25-35 kcals/kg), 50 g protein (1 g/kg), 1500 ml fluids (~1 ml/kcal or total fluids per MD).     MALNUTRITION:  % Weight Loss:  > 10% in 6 months (severe malnutrition)  % Intake:  <75% for > 7 days (non-severe malnutrition)  Subcutaneous Fat Loss:  None observed  Muscle Loss:  None observed  Fluid Retention:  None noted    Nutrition Diagnosis:    Food and nutrition related knowledge deficit related to Crohn's disease  as evidenced by need for diet education.    Nutrition Prescription: General diet    Nutrition Intervention:    Nutrition Education/Counseling:  Discussed current diet and symptoms. Patient has been able to maintain his weight with liberalization of diet following last visit. His symptoms are improving, but he frustrated by the slow progress. Although symptoms of bloating and reflux have improved somewhat, they still aren't where he would like them to be. We discussed how encouraging it is that weight has been stable. Curbing weight loss was the primary focus  of the previous visit and he accomplished this goal. He would like to address constipation next. Discussed current diet and noted that it was extremely low in fiber. Primary goal for next visit will be to increase intake of fiber. Will also continue to focus on weight maintenance/gain. Overall symptoms seem to be improving, albeit slowly. Discussed that this will be a process and provided encouragement to continue working to improve dietary intake.     Educational Materials Provided:  None provided during this visit  Patient verbalized understanding of education provided. See all recommendations under Goals.    Goals:  1. Regarding constipation - Try to increase fiber. Transition from rice cereal to oatmeal over a couple days. Start with 50% rice cereal and 50% oatmeal for a couple days and then transition to 100% brown rice. Also try to increase vegetable intake. Start with cooked vegetables (steamed, boiled, stir fried). Perhaps incorporate fiber containing starch for lunch (e.g. brown rice).    2. Continue to focus on maintaining caloric intake. Goal is continued weight maintenance with eventual weight gain.    Nutrition Monitoring and Evaluation: Will monitor adherence to nutrition recommendations at future RD visits.     Further Medical Nutrition Therapy:  Yes  Next Appointment (if applicable):  4 weeks  Patient was encouraged to call/contact RD with any further questions.    Yohan Daly, PhD, RD

## 2018-10-16 ENCOUNTER — TRANSFERRED RECORDS (OUTPATIENT)
Dept: HEALTH INFORMATION MANAGEMENT | Facility: CLINIC | Age: 28
End: 2018-10-16

## 2018-10-22 ENCOUNTER — TELEPHONE (OUTPATIENT)
Dept: GASTROENTEROLOGY | Facility: CLINIC | Age: 28
End: 2018-10-22

## 2018-10-22 NOTE — TELEPHONE ENCOUNTER
[facility] Call Center    Phone Message    May a detailed message be left on voicemail: yes    Reason for Call: Medication Refill Request    Has the patient contacted the pharmacy for the refill? Yes   Name of medication being requested: mesalamine (LIALDA) 1.2 g EC tablet,, 90 DAY SUPPLY   Provider who prescribed the medication: BRYANNA DIGGS  Pharmacy: [facility]  Date medication is needed: SHORTLY - ALMOST OUT         Action Taken: Message routed to:  [facility]

## 2018-10-23 ENCOUNTER — CARE COORDINATION (OUTPATIENT)
Dept: GASTROENTEROLOGY | Facility: CLINIC | Age: 28
End: 2018-10-23

## 2018-10-23 DIAGNOSIS — K50.90 CROHN'S DISEASE (H): ICD-10-CM

## 2018-10-23 RX ORDER — MESALAMINE 1.2 G/1
4800 TABLET, DELAYED RELEASE ORAL DAILY
Qty: 120 TABLET | Refills: 3 | Status: SHIPPED | OUTPATIENT
Start: 2018-10-23 | End: 2021-02-17

## 2018-10-26 ENCOUNTER — INFUSION THERAPY VISIT (OUTPATIENT)
Dept: INFUSION THERAPY | Facility: CLINIC | Age: 28
End: 2018-10-26
Attending: INTERNAL MEDICINE
Payer: COMMERCIAL

## 2018-10-26 VITALS
DIASTOLIC BLOOD PRESSURE: 73 MMHG | OXYGEN SATURATION: 99 % | SYSTOLIC BLOOD PRESSURE: 110 MMHG | WEIGHT: 112.7 LBS | RESPIRATION RATE: 16 BRPM | HEART RATE: 93 BPM | TEMPERATURE: 97.8 F | BODY MASS INDEX: 18.75 KG/M2

## 2018-10-26 DIAGNOSIS — K50.90 CROHN'S DISEASE WITHOUT COMPLICATION, UNSPECIFIED GASTROINTESTINAL TRACT LOCATION (H): Primary | ICD-10-CM

## 2018-10-26 LAB
ALBUMIN SERPL-MCNC: 3.9 G/DL (ref 3.4–5)
ALP SERPL-CCNC: 64 U/L (ref 40–150)
ALT SERPL W P-5'-P-CCNC: 23 U/L (ref 0–70)
AST SERPL W P-5'-P-CCNC: 14 U/L (ref 0–45)
BASOPHILS # BLD AUTO: 0 10E9/L (ref 0–0.2)
BASOPHILS NFR BLD AUTO: 0.4 %
BILIRUB DIRECT SERPL-MCNC: 0.4 MG/DL (ref 0–0.2)
BILIRUB SERPL-MCNC: 1.6 MG/DL (ref 0.2–1.3)
CRP SERPL-MCNC: <2.9 MG/L (ref 0–8)
DIFFERENTIAL METHOD BLD: NORMAL
EOSINOPHIL # BLD AUTO: 0.1 10E9/L (ref 0–0.7)
EOSINOPHIL NFR BLD AUTO: 1.4 %
ERYTHROCYTE [DISTWIDTH] IN BLOOD BY AUTOMATED COUNT: 11.8 % (ref 10–15)
ERYTHROCYTE [SEDIMENTATION RATE] IN BLOOD BY WESTERGREN METHOD: 6 MM/H (ref 0–15)
HCT VFR BLD AUTO: 45 % (ref 40–53)
HGB BLD-MCNC: 15.8 G/DL (ref 13.3–17.7)
IMM GRANULOCYTES # BLD: 0 10E9/L (ref 0–0.4)
IMM GRANULOCYTES NFR BLD: 0.2 %
LYMPHOCYTES # BLD AUTO: 2.7 10E9/L (ref 0.8–5.3)
LYMPHOCYTES NFR BLD AUTO: 46.7 %
MCH RBC QN AUTO: 32.6 PG (ref 26.5–33)
MCHC RBC AUTO-ENTMCNC: 35.1 G/DL (ref 31.5–36.5)
MCV RBC AUTO: 93 FL (ref 78–100)
MONOCYTES # BLD AUTO: 0.4 10E9/L (ref 0–1.3)
MONOCYTES NFR BLD AUTO: 7.1 %
NEUTROPHILS # BLD AUTO: 2.5 10E9/L (ref 1.6–8.3)
NEUTROPHILS NFR BLD AUTO: 44.2 %
NRBC # BLD AUTO: 0 10*3/UL
NRBC BLD AUTO-RTO: 0 /100
PLATELET # BLD AUTO: 292 10E9/L (ref 150–450)
PROT SERPL-MCNC: 7.9 G/DL (ref 6.8–8.8)
RBC # BLD AUTO: 4.84 10E12/L (ref 4.4–5.9)
WBC # BLD AUTO: 5.7 10E9/L (ref 4–11)

## 2018-10-26 PROCEDURE — 96413 CHEMO IV INFUSION 1 HR: CPT

## 2018-10-26 PROCEDURE — 85025 COMPLETE CBC W/AUTO DIFF WBC: CPT | Performed by: INTERNAL MEDICINE

## 2018-10-26 PROCEDURE — 96415 CHEMO IV INFUSION ADDL HR: CPT

## 2018-10-26 PROCEDURE — 85652 RBC SED RATE AUTOMATED: CPT | Performed by: INTERNAL MEDICINE

## 2018-10-26 PROCEDURE — 25000128 H RX IP 250 OP 636: Mod: ZF | Performed by: INTERNAL MEDICINE

## 2018-10-26 PROCEDURE — 86140 C-REACTIVE PROTEIN: CPT | Performed by: INTERNAL MEDICINE

## 2018-10-26 PROCEDURE — 80076 HEPATIC FUNCTION PANEL: CPT | Performed by: INTERNAL MEDICINE

## 2018-10-26 RX ORDER — ACETAMINOPHEN 325 MG/1
650 TABLET ORAL ONCE
Status: CANCELLED
Start: 2018-10-26 | End: 2018-10-26

## 2018-10-26 RX ORDER — DIPHENHYDRAMINE HCL 25 MG
25 CAPSULE ORAL ONCE
Status: CANCELLED
Start: 2018-10-26 | End: 2018-10-26

## 2018-10-26 RX ORDER — METHYLPREDNISOLONE SODIUM SUCCINATE 125 MG/2ML
125 INJECTION, POWDER, LYOPHILIZED, FOR SOLUTION INTRAMUSCULAR; INTRAVENOUS ONCE
Status: CANCELLED | OUTPATIENT
Start: 2018-10-26 | End: 2018-10-26

## 2018-10-26 RX ADMIN — INFLIXIMAB 500 MG: 100 INJECTION, POWDER, LYOPHILIZED, FOR SOLUTION INTRAVENOUS at 12:31

## 2018-10-26 NOTE — MR AVS SNAPSHOT
After Visit Summary   10/26/2018    Emil Garcia    MRN: [id number]           Patient Information     Date Of Birth          1990        Visit Information        Provider Department      10/26/2018 12:00 PM UC 50 ATC; UC SPEC INFUSION [facility] Procedure        Today's Diagnoses     Crohn's disease without complication, unspecified gastrointestinal tract location (H)    -  1       Follow-ups after your visit        Your next 10 appointments already scheduled     Nov 12, 2018  3:30 PM CST   (Arrive by 3:15 PM)   Return Nutrition with Yohan Daly RD   [facility] ([facility])    [address]  4th Floor  [place] [id number] [phone]            Nov 30, 2018 12:00 PM CST   Infusion 120 with UC SPEC INFUSION, UC 41 ATC   [facility] ([facility])    [address]  [address]  [place] [address] [phone]            Dec 28, 2018 12:00 PM CST   Infusion 120 with UC SPEC INFUSION, UC 50 ATC   [facility] ([facility])    [address]  [address]  [place] [address] [phone]              Future tests that were ordered for you today     Open Standing Orders        Priority Remaining Interval Expires Ordered    Notify Physician Routine [id number] PRN  10/26/2018            Who to contact     If you have questions or need follow up information about today's clinic visit or your schedule please contact [facility] SPECIALTY AND PROCEDURE directly at [phone].  Normal or non-critical lab and imaging results will be communicated to you by MyChart, letter or phone within 4 business days after the clinic has received the results. If you do not hear from us within 7 days, please contact the  clinic through [employer] or phone. If you have a critical or abnormal lab result, we will notify you by phone as soon as possible.  Submit refill requests through [employer] or call your pharmacy and they will forward the refill request to us. Please allow 3 business days for your refill to be completed.          Additional Information About Your Visit        [employer] Information     [employer] gives you secure access to your electronic health record. If you see a primary care provider, you can also send messages to your care team and make appointments. If you have questions, please call your primary care clinic.  If you do not have a primary care provider, please call [phone] and they will assist you.        Care EveryWhere ID     This is your Care EveryWhere ID. This could be used by other organizations to access your [place] medical records  [id number]        Your Vitals Were     Pulse Temperature Respirations Pulse Oximetry BMI (Body Mass Index)       93 97.8  F (36.6  C) (Oral) 16 99% 18.75 kg/m2        Blood Pressure from Last 3 Encounters:   10/26/18 110/73   09/28/18 112/75   09/25/18 108/74    Weight from Last 3 Encounters:   10/26/18 51.1 kg (112 lb 11.2 oz)   10/15/18 51 kg (112 lb 6.4 oz)   09/28/18 51 kg (112 lb 6.4 oz)              We Performed the Following     CBC with platelets differential     CRP inflammation     Erythrocyte sedimentation rate auto     Hepatic panel        Primary Care Provider Fax #    Physician No Ref-Primary [phone]       No address on file        Equal Access to Services     HAYLEE ALANIS : Hadii dax jimenezo Sobeatriceali, waaxda luqadaha, qaybta kaalmada fariha, beatris arteaga . So [facility] [phone].    ATENCIÓN: Si habla español, tiene a perkins disposición servicios gratuitos de asistencia lingüística. Llame al [phone].    We comply with applicable federal civil rights laws and [place] laws. We do not discriminate on the basis of race, color,  national origin, age, disability, sex, sexual orientation, or gender identity.            Thank you!     Thank you for choosing [facility] SPECIALTY AND PROCEDURE  for your care. Our goal is always to provide you with excellent care. Hearing back from our patients is one way we can continue to improve our services. Please take a few minutes to complete the written survey that you may receive in the mail after your visit with us. Thank you!             Your Updated Medication List - Protect others around you: Learn how to safely use, store and throw away your medicines at www.disposemymeds.org.          This list is accurate as of 10/26/18  4:46 PM.  Always use your most recent med list.                   Brand Name Dispense Instructions for use Diagnosis    BENADRYL PO      Take 50 mg by mouth as needed        inFLIXimab 100 MG injection    REMICADE     100 mg IV every 8 weeks        mesalamine 1.2 g EC tablet    LIALDA    120 tablet    Take 4 tablets (4,800 mg) by mouth daily    Crohn's disease (H)       MULTIVITAL PO      Take  by mouth.        VITAMIN D PO      Take by mouth daily

## 2018-10-26 NOTE — PROGRESS NOTES
Infusion nursing note:    Emil Garcia presents today to [facility] for a Remicade infusion.  During today's [facility] appointment orders from Dr. Patten were completed.  Dose # every 4 weeks    Progress note:  ID verified by name and .  Assessment completed.  Vitals were stable throughout time in [facility].  verbal education given to patient/representative regarding infusion and possible side effects.  Patient/representative verbalized understanding.    ~~~ NOTE: If the patient answers yes to any of the questions below, hold the infusion and contact ordering provider or on-call provider.    1. Have you recently had an elevated temperature, fever, chills, productive cough, coughing for 3 weeks or longer or hemoptysis,  abnormal vital signs, night sweats,  chest pain or have you noticed a decrease in your appetite, unexplained weight loss or fatigue? No  2. Do you have any open wounds or new incisions? No  3. Do you have any recent or upcoming hospitalizations, surgeries or dental procedures? No  4. Do you currently have or recently have had any signs of illness or infection or are you on any antibiotics? No  5. Have you had any new, sudden or worsening abdominal pain? No  6. Have you or anyone in your household received a live vaccination in the past 4 weeks? Please note:  No live vaccines while on biologic/chemotherapy until 6 months after the last treatment.  Patient can receive the flu vaccine (shot only) and the pneumovax.  It is optimal for the patient to get these vaccines mid cycle, but they can be given at any time as long as it is not on the day of the infusion. No  7. Have you recently been diagnosed with any new nervous system diseases (ie. Multiple sclerosis, Guillain [place], seizures, neurological changes) or cancer diagnosis? Are you on any form of radiation or chemotherapy? No  8. Are you pregnant or breast feeding or do you have plans of pregnancy in the future? No  9. Have you been having any signs of worsening  depression or suicidal ideations?  (benlysta only) No  10. Have there been any other new onset medical symptoms? No    Note: Pt denies any changes since infusion.      Wt 51.1 kg (112 lb 11.2 oz)  BMI 18.75 kg/m2    Infusion given over approximately  2 hours (pt preference)     Discharge Plan:  Reviewed with patient.  Given biologic medication or medication hand-out. Inform patient if any fever, chills or signs of infection, new symptoms, abdominal pain, heart palpitations, shortness of breath, reaction, weakness, neurological changes, seek medical attention immediately and should not receive infusions. No live virus vaccines prior to or during treatment or up to 6 months post infusion. If the patient has an upcoming procedure or surgery, this should be discussed with the rheumatologist and surgeon or provider.    Discharge instructions were reviewed with patient Yes  Patient/representative verbalized understanding of discharge instructions and all questions answered Yes.    Discharged from [facility] at 1440 with self to home.    Corinne Wagner    Administrations This Visit     inFLIXimab (REMICADE) 500 mg in sodium chloride 0.9 % 325 mL infusion     Admin Date Action Dose Rate Route Administered By          10/26/2018 New Bag 500 mg 162.5 mL/hr Intravenous Corinne Wagner RN

## 2018-11-19 DIAGNOSIS — K50.10 CROHN'S DISEASE OF LARGE INTESTINE WITHOUT COMPLICATION (H): ICD-10-CM

## 2018-11-19 RX ORDER — BUDESONIDE 3 MG/1
9 CAPSULE, COATED PELLETS ORAL EVERY MORNING
Qty: 270 CAPSULE | Refills: 1 | OUTPATIENT
Start: 2018-11-19

## 2018-11-30 ENCOUNTER — INFUSION THERAPY VISIT (OUTPATIENT)
Dept: INFUSION THERAPY | Facility: CLINIC | Age: 28
End: 2018-11-30
Attending: INTERNAL MEDICINE
Payer: COMMERCIAL

## 2018-11-30 VITALS
WEIGHT: 114.2 LBS | TEMPERATURE: 98 F | SYSTOLIC BLOOD PRESSURE: 97 MMHG | BODY MASS INDEX: 19 KG/M2 | OXYGEN SATURATION: 99 % | HEART RATE: 74 BPM | DIASTOLIC BLOOD PRESSURE: 64 MMHG | RESPIRATION RATE: 16 BRPM

## 2018-11-30 DIAGNOSIS — K50.90 CROHN'S DISEASE WITHOUT COMPLICATION, UNSPECIFIED GASTROINTESTINAL TRACT LOCATION (H): Primary | ICD-10-CM

## 2018-11-30 PROCEDURE — 96415 CHEMO IV INFUSION ADDL HR: CPT

## 2018-11-30 PROCEDURE — 25000128 H RX IP 250 OP 636: Mod: ZF | Performed by: INTERNAL MEDICINE

## 2018-11-30 PROCEDURE — 96413 CHEMO IV INFUSION 1 HR: CPT

## 2018-11-30 RX ORDER — METHYLPREDNISOLONE SODIUM SUCCINATE 125 MG/2ML
125 INJECTION, POWDER, LYOPHILIZED, FOR SOLUTION INTRAMUSCULAR; INTRAVENOUS ONCE
Status: CANCELLED | OUTPATIENT
Start: 2018-11-30 | End: 2018-11-30

## 2018-11-30 RX ORDER — ACETAMINOPHEN 325 MG/1
650 TABLET ORAL ONCE
Status: CANCELLED
Start: 2018-11-30 | End: 2018-11-30

## 2018-11-30 RX ORDER — DIPHENHYDRAMINE HCL 25 MG
25 CAPSULE ORAL ONCE
Status: CANCELLED
Start: 2018-11-30 | End: 2018-11-30

## 2018-11-30 RX ADMIN — INFLIXIMAB 500 MG: 100 INJECTION, POWDER, LYOPHILIZED, FOR SOLUTION INTRAVENOUS at 13:06

## 2018-11-30 NOTE — MR AVS SNAPSHOT
After Visit Summary   11/30/2018    Emil Garcia    MRN: [id number]           Patient Information     Date Of Birth          1990        Visit Information        Provider Department      11/30/2018 12:00 PM UC 41 ATC; UC SPEC INFUSION [facility] Specialty [facility] Procedure        Today's Diagnoses     Crohn's disease without complication, unspecified gastrointestinal tract location (H)    -  1       Follow-ups after your visit        Your next 10 appointments already scheduled     Dec 28, 2018 12:00 PM CST   Infusion 120 with UC SPEC INFUSION, UC 50 ATC   [facility] Specialty [facility] Procedure ([facility])    [address]  [address]  [place] [address] [phone]              Who to contact     If you have questions or need follow up information about today's clinic visit or your schedule please contact [facility] SPECIALTY AND PROCEDURE directly at [phone].  Normal or non-critical lab and imaging results will be communicated to you by [employer], letter or phone within 4 business days after the clinic has received the results. If you do not hear from us within 7 days, please contact the clinic through [employer] or phone. If you have a critical or abnormal lab result, we will notify you by phone as soon as possible.  Submit refill requests through [employer] or call your pharmacy and they will forward the refill request to us. Please allow 3 business days for your refill to be completed.          Additional Information About Your Visit        MyChart Information     [employer] gives you secure access to your electronic health record. If you see a primary care provider, you can also send messages to your care team and make appointments. If you have questions, please call your primary care clinic.  If you do not have a primary care provider, please call [phone] and they will assist you.         Care EveryWhere ID     This is your Care EveryWhere ID. This could be used by other organizations to access your [place] medical records  [id number]        Your Vitals Were     Pulse Temperature Respirations Pulse Oximetry BMI (Body Mass Index)       74 98  F (36.7  C) (Oral) 16 99% 19 kg/m2        Blood Pressure from Last 3 Encounters:   11/30/18 97/64   10/26/18 110/73   09/28/18 112/75    Weight from Last 3 Encounters:   11/30/18 51.8 kg (114 lb 3.2 oz)   10/26/18 51.1 kg (112 lb 11.2 oz)   10/15/18 51 kg (112 lb 6.4 oz)              Today, you had the following     No orders found for display       Primary Care Provider Fax #    Physician No Ref-Primary [phone]       No address on file        Equal Access to Services     HAYLEE ALANIS : Shahla Pereira, cecil yuan, batsheva fried, beatris arteaga . So [facility] [phone].    ATENCIÓN: Si habla español, tiene a perkins disposición servicios gratuitos de asistencia lingüística. Richard al [phone].    We comply with applicable federal civil rights laws and [place] laws. We do not discriminate on the basis of race, color, national origin, age, disability, sex, sexual orientation, or gender identity.            Thank you!     Thank you for choosing [facility] SPECIALTY AND PROCEDURE  for your care. Our goal is always to provide you with excellent care. Hearing back from our patients is one way we can continue to improve our services. Please take a few minutes to complete the written survey that you may receive in the mail after your visit with us. Thank you!             Your Updated Medication List - Protect others around you: Learn how to safely use, store and throw away your medicines at www.disposemymeds.org.          This list is accurate as of 11/30/18  4:06 PM.  Always use your most recent med list.                   Brand Name Dispense Instructions for use Diagnosis     BENADRYL PO      Take 50 mg by mouth as needed        inFLIXimab 100 MG injection    REMICADE     100 mg IV every 8 weeks        mesalamine 1.2 g EC tablet    LIALDA    120 tablet    Take 4 tablets (4,800 mg) by mouth daily    Crohn's disease (H)       MULTIVITAL PO      Take  by mouth.        VITAMIN D PO      Take by mouth daily

## 2018-11-30 NOTE — PROGRESS NOTES
Infusion nursing note:    Emil Garcia presents today to [facility] for a Remicade infusion.  During today's [facility] appointment orders from Dr. Patten were completed.  Dose # every 4 weeks    Progress note:  ID verified by name and .  Assessment completed.  Vitals were stable throughout time in [facility].  verbal education given to patient/representative regarding infusion and possible side effects.  Patient/representative verbalized understanding.    ~~~ NOTE: If the patient answers yes to any of the questions below, hold the infusion and contact ordering provider or on-call provider.    1. Have you recently had an elevated temperature, fever, chills, productive cough, coughing for 3 weeks or longer or hemoptysis,  abnormal vital signs, night sweats,  chest pain or have you noticed a decrease in your appetite, unexplained weight loss or fatigue? No  2. Do you have any open wounds or new incisions? No  3. Do you have any recent or upcoming hospitalizations, surgeries or dental procedures? No  4. Do you currently have or recently have had any signs of illness or infection or are you on any antibiotics? No  5. Have you had any new, sudden or worsening abdominal pain? No  6. Have you or anyone in your household received a live vaccination in the past 4 weeks? Please note:  No live vaccines while on biologic/chemotherapy until 6 months after the last treatment.  Patient can receive the flu vaccine (shot only) and the pneumovax.  It is optimal for the patient to get these vaccines mid cycle, but they can be given at any time as long as it is not on the day of the infusion. No  7. Have you recently been diagnosed with any new nervous system diseases (ie. Multiple sclerosis, Guillain [place], seizures, neurological changes) or cancer diagnosis? Are you on any form of radiation or chemotherapy? No  8. Are you pregnant or breast feeding or do you have plans of pregnancy in the future? No  9. Have you been having any signs of worsening  depression or suicidal ideations?  (benlysta only) No  10. Have there been any other new onset medical symptoms? No    Note: Pt denies any changes since infusion.     Administrations This Visit     inFLIXimab (REMICADE) 500 mg in sodium chloride 0.9 % 325 mL infusion     Admin Date Action Dose Rate Route Administered By          11/30/2018 New Bag 500 mg 162.5 mL/hr Intravenous Dora Gallagher RN                           /73  Pulse 71  Temp 98  F (36.7  C) (Oral)  Resp 16  Wt 51.8 kg (114 lb 3.2 oz)  SpO2 99%  BMI 19 kg/m2    Infusion given over approximately  2 hours (pt preference)     Discharge Plan:  Reviewed with patient.  Given biologic medication or medication hand-out. Inform patient if any fever, chills or signs of infection, new symptoms, abdominal pain, heart palpitations, shortness of breath, reaction, weakness, neurological changes, seek medical attention immediately and should not receive infusions. No live virus vaccines prior to or during treatment or up to 6 months post infusion. If the patient has an upcoming procedure or surgery, this should be discussed with the rheumatologist and surgeon or provider.    Discharge instructions were reviewed with patient Yes  Patient/representative verbalized understanding of discharge instructions and all questions answered Yes.    Discharged from [facility] at 1440 with self to home.    Dora Gallagher RN

## 2019-01-02 ENCOUNTER — PATIENT OUTREACH (OUTPATIENT)
Dept: GASTROENTEROLOGY | Facility: CLINIC | Age: 29
End: 2019-01-02

## 2019-01-02 NOTE — PROGRESS NOTES
Pt is transferring care to Dr. Feldman at [facility] as of the end of January. Will have one more infusion here with Dr. Patten as the provider. Needs prior. Message to prior team to check on prior as pt has new insurance.

## 2019-01-03 ENCOUNTER — PATIENT OUTREACH (OUTPATIENT)
Dept: GASTROENTEROLOGY | Facility: CLINIC | Age: 29
End: 2019-01-03

## 2019-01-03 NOTE — PROGRESS NOTES
Left a message for pt that his infusion is covered.         Hi Dr. Patten,     We just now received the approval for Remicade.     Feel free to schedule as you see fit. Any questions, please let us know.     Thank you!     Ethan Phoenix - [facility]    - Infusion Therapy   [facility]   [email or web address]  [email or web address]     Office: [phone]  Fax: [phone]

## 2019-01-04 ENCOUNTER — INFUSION THERAPY VISIT (OUTPATIENT)
Dept: INFUSION THERAPY | Facility: CLINIC | Age: 29
End: 2019-01-04
Attending: INTERNAL MEDICINE
Payer: COMMERCIAL

## 2019-01-04 VITALS
WEIGHT: 121.8 LBS | HEART RATE: 71 BPM | DIASTOLIC BLOOD PRESSURE: 71 MMHG | OXYGEN SATURATION: 99 % | BODY MASS INDEX: 20.27 KG/M2 | TEMPERATURE: 98 F | SYSTOLIC BLOOD PRESSURE: 111 MMHG

## 2019-01-04 DIAGNOSIS — K50.90 CROHN'S DISEASE WITHOUT COMPLICATION, UNSPECIFIED GASTROINTESTINAL TRACT LOCATION (H): Primary | ICD-10-CM

## 2019-01-04 LAB
ALBUMIN SERPL-MCNC: 3.7 G/DL (ref 3.4–5)
ALP SERPL-CCNC: 61 U/L (ref 40–150)
ALT SERPL W P-5'-P-CCNC: 23 U/L (ref 0–70)
AST SERPL W P-5'-P-CCNC: 14 U/L (ref 0–45)
BASOPHILS # BLD AUTO: 0 10E9/L (ref 0–0.2)
BASOPHILS NFR BLD AUTO: 0.4 %
BILIRUB DIRECT SERPL-MCNC: 0.3 MG/DL (ref 0–0.2)
BILIRUB SERPL-MCNC: 1.6 MG/DL (ref 0.2–1.3)
CRP SERPL-MCNC: <2.9 MG/L (ref 0–8)
DIFFERENTIAL METHOD BLD: NORMAL
EOSINOPHIL # BLD AUTO: 0.1 10E9/L (ref 0–0.7)
EOSINOPHIL NFR BLD AUTO: 2.4 %
ERYTHROCYTE [DISTWIDTH] IN BLOOD BY AUTOMATED COUNT: 11.6 % (ref 10–15)
ERYTHROCYTE [SEDIMENTATION RATE] IN BLOOD BY WESTERGREN METHOD: 6 MM/H (ref 0–15)
HCT VFR BLD AUTO: 43.1 % (ref 40–53)
HGB BLD-MCNC: 15.6 G/DL (ref 13.3–17.7)
IMM GRANULOCYTES # BLD: 0 10E9/L (ref 0–0.4)
IMM GRANULOCYTES NFR BLD: 0.2 %
LYMPHOCYTES # BLD AUTO: 2.5 10E9/L (ref 0.8–5.3)
LYMPHOCYTES NFR BLD AUTO: 45.6 %
MCH RBC QN AUTO: 32.6 PG (ref 26.5–33)
MCHC RBC AUTO-ENTMCNC: 36.2 G/DL (ref 31.5–36.5)
MCV RBC AUTO: 90 FL (ref 78–100)
MONOCYTES # BLD AUTO: 0.4 10E9/L (ref 0–1.3)
MONOCYTES NFR BLD AUTO: 7.9 %
NEUTROPHILS # BLD AUTO: 2.4 10E9/L (ref 1.6–8.3)
NEUTROPHILS NFR BLD AUTO: 43.5 %
NRBC # BLD AUTO: 0 10*3/UL
NRBC BLD AUTO-RTO: 0 /100
PLATELET # BLD AUTO: 274 10E9/L (ref 150–450)
PROT SERPL-MCNC: 7.4 G/DL (ref 6.8–8.8)
RBC # BLD AUTO: 4.79 10E12/L (ref 4.4–5.9)
WBC # BLD AUTO: 5.4 10E9/L (ref 4–11)

## 2019-01-04 PROCEDURE — 96415 CHEMO IV INFUSION ADDL HR: CPT

## 2019-01-04 PROCEDURE — 85652 RBC SED RATE AUTOMATED: CPT | Performed by: INTERNAL MEDICINE

## 2019-01-04 PROCEDURE — 96413 CHEMO IV INFUSION 1 HR: CPT

## 2019-01-04 PROCEDURE — 25000128 H RX IP 250 OP 636: Mod: ZF | Performed by: INTERNAL MEDICINE

## 2019-01-04 PROCEDURE — 86140 C-REACTIVE PROTEIN: CPT | Performed by: INTERNAL MEDICINE

## 2019-01-04 PROCEDURE — 85025 COMPLETE CBC W/AUTO DIFF WBC: CPT | Performed by: INTERNAL MEDICINE

## 2019-01-04 PROCEDURE — 80076 HEPATIC FUNCTION PANEL: CPT | Performed by: INTERNAL MEDICINE

## 2019-01-04 RX ORDER — ACETAMINOPHEN 325 MG/1
650 TABLET ORAL ONCE
Status: CANCELLED
Start: 2019-01-04 | End: 2019-01-04

## 2019-01-04 RX ORDER — METHYLPREDNISOLONE SODIUM SUCCINATE 125 MG/2ML
125 INJECTION, POWDER, LYOPHILIZED, FOR SOLUTION INTRAMUSCULAR; INTRAVENOUS ONCE
Status: DISCONTINUED | OUTPATIENT
Start: 2019-01-04 | End: 2019-01-04 | Stop reason: HOSPADM

## 2019-01-04 RX ORDER — METHYLPREDNISOLONE SODIUM SUCCINATE 125 MG/2ML
125 INJECTION, POWDER, LYOPHILIZED, FOR SOLUTION INTRAMUSCULAR; INTRAVENOUS ONCE
Status: CANCELLED | OUTPATIENT
Start: 2019-01-04 | End: 2019-01-04

## 2019-01-04 RX ORDER — DIPHENHYDRAMINE HCL 25 MG
25 CAPSULE ORAL ONCE
Status: DISCONTINUED | OUTPATIENT
Start: 2019-01-04 | End: 2019-01-04 | Stop reason: HOSPADM

## 2019-01-04 RX ORDER — ACETAMINOPHEN 325 MG/1
650 TABLET ORAL ONCE
Status: DISCONTINUED | OUTPATIENT
Start: 2019-01-04 | End: 2019-01-04 | Stop reason: HOSPADM

## 2019-01-04 RX ORDER — DIPHENHYDRAMINE HCL 25 MG
25 CAPSULE ORAL ONCE
Status: CANCELLED
Start: 2019-01-04 | End: 2019-01-04

## 2019-01-04 RX ADMIN — INFLIXIMAB 500 MG: 100 INJECTION, POWDER, LYOPHILIZED, FOR SOLUTION INTRAVENOUS at 14:22

## 2019-01-04 ASSESSMENT — PAIN SCALES - GENERAL: PAINLEVEL: NO PAIN (0)

## 2019-01-04 NOTE — PROGRESS NOTES

## 2019-01-04 NOTE — PROGRESS NOTES
Nursing Note  [facility]:   Chief Complaint   Patient presents with     Infusion     Remicade     During [facility]'s [facility] appointment, orders from Dr. Patten were completd.  Frequency: Q28 days    Progress note:  Patient identification verified by name and date of birth.  Assessment completed.  Vitals recorded in Doc Flowsheets.  Patient was provided with education regarding infusion and possible side effects.  Patient verbalized understanding.      needed: No  Premedications: were not ordered.  Infusion Rates: 162.5 ml/hr.  Approximate Infusion length:2 hours.   Labs: were drawn per orders.   Vascular access: peripheral IV placed today.  Treatment Conditions: see pre-infusion checklist in today's note by   Patient tolerated infusion: well.    Drug Waste Record? No     Discharge Plan:   Follow up plan of care with: ongoing infusions at [facility]. and primary medical doctor.  Discharge instructions were reviewed with patient.  Patient/representative verbalized understanding of discharge instructions and all questions answered.  Patient discharged [facility] in stable condition.    Maite Platt RN       Administrations This Visit     inFLIXimab (REMICADE) 500 mg in sodium chloride 0.9 % 325 mL infusion     Admin Date  01/04/2019 Action  New Bag Dose  500 mg Rate  162.5 mL/hr Route  Intravenous Administered By  Maite Platt RN                /69   Pulse 71   Temp 98  F (36.7  C) (Oral)   Wt 55.2 kg (121 lb 12.8 oz)   SpO2 99%   BMI 20.27 kg/m

## 2019-02-01 ENCOUNTER — INFUSION THERAPY VISIT (OUTPATIENT)
Dept: INFUSION THERAPY | Facility: CLINIC | Age: 29
End: 2019-02-01
Attending: INTERNAL MEDICINE
Payer: COMMERCIAL

## 2019-02-01 VITALS
DIASTOLIC BLOOD PRESSURE: 64 MMHG | OXYGEN SATURATION: 100 % | RESPIRATION RATE: 16 BRPM | BODY MASS INDEX: 20.14 KG/M2 | SYSTOLIC BLOOD PRESSURE: 97 MMHG | WEIGHT: 121 LBS | HEART RATE: 87 BPM | TEMPERATURE: 97.8 F

## 2019-02-01 DIAGNOSIS — K50.90 CROHN'S DISEASE WITHOUT COMPLICATION, UNSPECIFIED GASTROINTESTINAL TRACT LOCATION (H): Primary | ICD-10-CM

## 2019-02-01 PROCEDURE — 96413 CHEMO IV INFUSION 1 HR: CPT

## 2019-02-01 PROCEDURE — 25000128 H RX IP 250 OP 636: Mod: ZF | Performed by: INTERNAL MEDICINE

## 2019-02-01 PROCEDURE — 96415 CHEMO IV INFUSION ADDL HR: CPT

## 2019-02-01 RX ORDER — ACETAMINOPHEN 325 MG/1
650 TABLET ORAL ONCE
Status: CANCELLED
Start: 2019-02-01 | End: 2019-02-01

## 2019-02-01 RX ORDER — DIPHENHYDRAMINE HCL 25 MG
25 CAPSULE ORAL ONCE
Status: CANCELLED
Start: 2019-02-01 | End: 2019-02-01

## 2019-02-01 RX ORDER — METHYLPREDNISOLONE SODIUM SUCCINATE 125 MG/2ML
125 INJECTION, POWDER, LYOPHILIZED, FOR SOLUTION INTRAMUSCULAR; INTRAVENOUS ONCE
Status: CANCELLED | OUTPATIENT
Start: 2019-02-01 | End: 2019-02-01

## 2019-02-01 RX ADMIN — INFLIXIMAB 500 MG: 100 INJECTION, POWDER, LYOPHILIZED, FOR SOLUTION INTRAVENOUS at 13:11

## 2019-02-01 NOTE — PROGRESS NOTES
Nursing Note  Emil Garcia presents [facility] for:   Chief Complaint   Patient presents with     Infusion     IV Remicade     During [facility]'s [facility] appointment, orders from Dr HAYES Patten were completed.  Frequency: monthly    Progress note:  Patient identification verified by name and date of birth.  Assessment completed.  Vitals recorded in Doc Flowsheets.  Patient was provided with education regarding infusion and possible side effects.  Patient verbalized understanding.      needed: No  Premedications: historically patient has not taken pre-medications prior to infusion.  Infusion Rates: infusion given over approximately 2 hours due to hx of reaction.  Labs: were drawn per orders.   Vascular access: peripheral IV placed today.  Treatment Conditions: ~~~ NOTE: If the patient answers yes to any of the questions below, hold the infusion and contact ordering provider or on-call provider.    1. Have you recently had an elevated temperature, fever, chills, productive cough, coughing for 3 weeks or longer or hemoptysis,  abnormal vital signs, night sweats,  chest pain or have you noticed a decrease in your appetite, unexplained weight loss or fatigue? No  2. Do you have any open wounds or new incisions? No  3. Do you have any recent or upcoming hospitalizations, surgeries or dental procedures? No  4. Do you currently have or recently have had any signs of illness or infection or are you on any antibiotics? No  5. Have you had any new, sudden or worsening abdominal pain? No  6. Have you or anyone in your household received a live vaccination in the past 4 weeks? Please note:  No live vaccines while on biologic/chemotherapy until 6 months after the last treatment.  Patient can receive the flu vaccine (shot only) and the pneumovax.  It is optimal for the patient to get these vaccines mid cycle, but they can be given at any time as long as it  is not on the day of the infusion. No  7. Have you recently been diagnosed with any new nervous system diseases (ie. Multiple sclerosis, Guillain [place], seizures, neurological changes) or cancer diagnosis? Are you on any form of radiation or chemotherapy? No  8. Are you pregnant or breast feeding or do you have plans of pregnancy in the future? NA  9. Have you been having any signs of worsening depression or suicidal ideations?  (benlysta only) NA  10. Have there been any other new onset medical symptoms? No    Patient tolerated infusion: well.    Discharge Plan:   Follow up plan of care with: patient states he will be obtaining a new GI physician after today's infusion.  Discharge instructions were reviewed with patient.  Patient/representative verbalized understanding of discharge instructions and all questions answered.  [facility] in stable condition.    Nayla Arnett RN       Administrations This Visit     inFLIXimab (REMICADE) 500 mg in sodium chloride 0.9 % 325 mL infusion     Admin Date  02/01/2019 Action  New Bag Dose  500 mg Rate  162.5 mL/hr Route  Intravenous Administered By  Nayla Arnett RN                  BP (P) 115/73   Pulse (P) 94   Temp (P) 97.8  F (36.6  C) (Oral)   Resp (P) 16   Wt 54.9 kg (121 lb)   SpO2 (P) 100%   BMI 20.14 kg/m

## 2019-02-11 LAB — LAB SCANNED RESULT: NORMAL

## 2019-02-14 ENCOUNTER — TRANSFERRED RECORDS (OUTPATIENT)
Dept: HEALTH INFORMATION MANAGEMENT | Facility: CLINIC | Age: 29
End: 2019-02-14

## 2019-02-19 ENCOUNTER — MYC MEDICAL ADVICE (OUTPATIENT)
Dept: INTERNAL MEDICINE | Facility: CLINIC | Age: 29
End: 2019-02-19

## 2019-02-20 ENCOUNTER — TELEPHONE (OUTPATIENT)
Dept: INTERNAL MEDICINE | Facility: CLINIC | Age: 29
End: 2019-02-20

## 2019-02-20 NOTE — TELEPHONE ENCOUNTER
He will need CBC, hepaptic panel and 500 mg Remicade IV every 4 weeks with the lab reports sent to  at [facility].  Thanks   JL

## 2019-02-21 RX ORDER — ACETAMINOPHEN 325 MG/1
650 TABLET ORAL ONCE
Status: CANCELLED
Start: 2019-02-21 | End: 2019-02-21

## 2019-02-21 RX ORDER — METHYLPREDNISOLONE SODIUM SUCCINATE 125 MG/2ML
125 INJECTION, POWDER, LYOPHILIZED, FOR SOLUTION INTRAMUSCULAR; INTRAVENOUS ONCE
Status: CANCELLED | OUTPATIENT
Start: 2019-02-21 | End: 2019-02-21

## 2019-02-21 RX ORDER — DIPHENHYDRAMINE HCL 25 MG
25 CAPSULE ORAL ONCE
Status: CANCELLED
Start: 2019-02-21 | End: 2019-02-21

## 2019-02-21 NOTE — TELEPHONE ENCOUNTER
Remicade infusion order placed per Dr. Medrano. I sent the [place] message to the pt regarding this order and scheduling info.    Soon-Mi  --------------------------------------------------------------      He will need CBC, hepaptic panel and 500 mg Remicade IV every 4 weeks with the lab reports sent to  at [facility].  Thanks   JL

## 2019-02-22 ENCOUNTER — MYC MEDICAL ADVICE (OUTPATIENT)
Dept: INTERNAL MEDICINE | Facility: CLINIC | Age: 29
End: 2019-02-22

## 2019-02-22 DIAGNOSIS — K50.90 CROHN'S DISEASE (H): ICD-10-CM

## 2019-02-23 ENCOUNTER — MYC MEDICAL ADVICE (OUTPATIENT)
Dept: INTERNAL MEDICINE | Facility: CLINIC | Age: 29
End: 2019-02-23

## 2019-02-23 NOTE — TELEPHONE ENCOUNTER
mesalamine (LIALDA) 1.2 g EC tablet  Last Written Prescription Date:  10/23/18  Last Fill Quantity: 120,   # refills: 3  Last Office Visit : 10/15/18  Future Office visit:  None    Creatinine   Date Value Ref Range Status   04/06/2011 0.90 0.50 - 1.00 mg/dL Final         Routing refill request to provider for tabsew/approval because:  tabs not on protocol. only suppository.  Creat past due.

## 2019-02-26 NOTE — TELEPHONE ENCOUNTER
Dr. Medrano    I placed Remicade infusion (500 mg every 4 week) order as you mentioned on 2/20/19. Dr. Bowles's order was 10 mg/kg. Do you want to keep 500 mg or want to change to 10 mg/kg ?    Pt is going to have colonoscopy on 4/19 and Dr. Bowles wants to lower the dose depends on the result.    Soon-Mi

## 2019-03-07 NOTE — TELEPHONE ENCOUNTER
Discussed with Dr. Medrano. Remicade infusion order updated for 10mg/kg dosing. I confirmed with the infusion pharmacy that based on patient's current weight of 121lbs (55kg), this would still round down to a Remicade dose of 500mg.    Mary Andrea RN

## 2019-03-08 ENCOUNTER — INFUSION THERAPY VISIT (OUTPATIENT)
Dept: INFUSION THERAPY | Facility: CLINIC | Age: 29
End: 2019-03-08
Attending: INTERNAL MEDICINE
Payer: COMMERCIAL

## 2019-03-08 VITALS
HEART RATE: 69 BPM | OXYGEN SATURATION: 100 % | SYSTOLIC BLOOD PRESSURE: 110 MMHG | BODY MASS INDEX: 20.68 KG/M2 | WEIGHT: 124.3 LBS | TEMPERATURE: 97.7 F | DIASTOLIC BLOOD PRESSURE: 76 MMHG | RESPIRATION RATE: 16 BRPM

## 2019-03-08 DIAGNOSIS — K50.90 CROHN'S DISEASE WITHOUT COMPLICATION, UNSPECIFIED GASTROINTESTINAL TRACT LOCATION (H): Primary | ICD-10-CM

## 2019-03-08 LAB
ALBUMIN SERPL-MCNC: 4.1 G/DL (ref 3.4–5)
ALP SERPL-CCNC: 64 U/L (ref 40–150)
ALT SERPL W P-5'-P-CCNC: 23 U/L (ref 0–70)
AST SERPL W P-5'-P-CCNC: 19 U/L (ref 0–45)
BASOPHILS # BLD AUTO: 0 10E9/L (ref 0–0.2)
BASOPHILS NFR BLD AUTO: 0.4 %
BILIRUB DIRECT SERPL-MCNC: 0.3 MG/DL (ref 0–0.2)
BILIRUB SERPL-MCNC: 1.6 MG/DL (ref 0.2–1.3)
DIFFERENTIAL METHOD BLD: NORMAL
EOSINOPHIL # BLD AUTO: 0.1 10E9/L (ref 0–0.7)
EOSINOPHIL NFR BLD AUTO: 1.5 %
ERYTHROCYTE [DISTWIDTH] IN BLOOD BY AUTOMATED COUNT: 11.3 % (ref 10–15)
HCT VFR BLD AUTO: 45.9 % (ref 40–53)
HGB BLD-MCNC: 15.7 G/DL (ref 13.3–17.7)
IMM GRANULOCYTES # BLD: 0 10E9/L (ref 0–0.4)
IMM GRANULOCYTES NFR BLD: 0.2 %
LYMPHOCYTES # BLD AUTO: 2.8 10E9/L (ref 0.8–5.3)
LYMPHOCYTES NFR BLD AUTO: 51.8 %
MCH RBC QN AUTO: 30.5 PG (ref 26.5–33)
MCHC RBC AUTO-ENTMCNC: 34.2 G/DL (ref 31.5–36.5)
MCV RBC AUTO: 89 FL (ref 78–100)
MONOCYTES # BLD AUTO: 0.4 10E9/L (ref 0–1.3)
MONOCYTES NFR BLD AUTO: 6.4 %
NEUTROPHILS # BLD AUTO: 2.2 10E9/L (ref 1.6–8.3)
NEUTROPHILS NFR BLD AUTO: 39.7 %
NRBC # BLD AUTO: 0 10*3/UL
NRBC BLD AUTO-RTO: 0 /100
PLATELET # BLD AUTO: 274 10E9/L (ref 150–450)
PROT SERPL-MCNC: 8 G/DL (ref 6.8–8.8)
RBC # BLD AUTO: 5.15 10E12/L (ref 4.4–5.9)
WBC # BLD AUTO: 5.5 10E9/L (ref 4–11)

## 2019-03-08 PROCEDURE — 25000128 H RX IP 250 OP 636: Mod: ZF | Performed by: INTERNAL MEDICINE

## 2019-03-08 PROCEDURE — 80076 HEPATIC FUNCTION PANEL: CPT | Performed by: INTERNAL MEDICINE

## 2019-03-08 PROCEDURE — 25800030 ZZH RX IP 258 OP 636: Mod: ZF | Performed by: INTERNAL MEDICINE

## 2019-03-08 PROCEDURE — 96415 CHEMO IV INFUSION ADDL HR: CPT

## 2019-03-08 PROCEDURE — 85025 COMPLETE CBC W/AUTO DIFF WBC: CPT | Performed by: INTERNAL MEDICINE

## 2019-03-08 PROCEDURE — 96413 CHEMO IV INFUSION 1 HR: CPT

## 2019-03-08 RX ORDER — ACETAMINOPHEN 325 MG/1
650 TABLET ORAL ONCE
Status: CANCELLED
Start: 2019-03-09 | End: 2019-03-09

## 2019-03-08 RX ORDER — DIPHENHYDRAMINE HCL 25 MG
25 CAPSULE ORAL ONCE
Status: CANCELLED
Start: 2019-03-09 | End: 2019-03-09

## 2019-03-08 RX ORDER — METHYLPREDNISOLONE SODIUM SUCCINATE 125 MG/2ML
125 INJECTION, POWDER, LYOPHILIZED, FOR SOLUTION INTRAMUSCULAR; INTRAVENOUS ONCE
Status: CANCELLED | OUTPATIENT
Start: 2019-03-09 | End: 2019-03-09

## 2019-03-08 RX ADMIN — INFLIXIMAB 600 MG: 100 INJECTION, POWDER, LYOPHILIZED, FOR SOLUTION INTRAVENOUS at 12:53

## 2019-03-08 NOTE — PROGRESS NOTES
~~~ NOTE: If the patient answers yes to any of the questions below, hold the infusion and contact ordering provider or on-call provider.    1. Have you recently had an elevated temperature, fever, chills, productive cough, coughing for 3 weeks or longer or hemoptysis,  abnormal vital signs, night sweats,  chest pain or have you noticed a decrease in your appetite, unexplained weight loss or fatigue? No  2. Do you have any open wounds or new incisions? No  3. Do you have any recent or upcoming hospitalizations, surgeries or dental procedures? No  4. Do you currently have or recently have had any signs of illness or infection or are you on any antibiotics? No  5. Have you had any new, sudden or worsening abdominal pain? No  6. Have you or anyone in your household received a live vaccination in the past 4 weeks? Please note:  No live vaccines while on biologic/chemotherapy until 6 months after the last treatment.  Patient can receive the flu vaccine (shot only) and the pneumovax.  It is optimal for the patient to get these vaccines mid cycle, but they can be given at any time as long as it is not on the day of the infusion. No  7. Have you recently been diagnosed with any new nervous system diseases (ie. Multiple sclerosis, Guillain [place], seizures, neurological changes) or cancer diagnosis? Are you on any form of radiation or chemotherapy? No  8. Are you pregnant or breast feeding or do you have plans of pregnancy in the future? No  9. Have you been having any signs of worsening depression or suicidal ideations?  (benlysta only) No  10. Have there been any other new onset medical symptoms? No     Nursing Note  Emil Garcia [facility]:   Chief Complaint   Patient presents with     Infusion     Remicade     During today's [facility] appointment, orders from Dr. Medrano were completed.  Frequency: monthly    Progress note:  Patient  identification verified by name and date of birth.  Assessment completed.  Vitals recorded in Doc Flowsheets.  Patient was provided with education regarding infusion and possible side effects.  Patient verbalized understanding.      needed: No  Premedications: historically patient has not taken pre-medications prior to infusion.  Infusion Rates: 167 ml/hr.  Approximate Infusion length:2 hours.   Labs: were drawn per orders. And copy faxed to [facility] per written orders.   Vascular access: peripheral IV placed today.  Treatment Conditions: Biologic checklist performed prior to infusion; patient denies fever, chills, signs of infection, recent illness, on antibiotics, productive cough or elevated temperature.  Patient tolerated infusion: well.      Discharge Plan:   Follow up plan of care with: ongoing infusions at [facility].  Discharge instructions were reviewed with patient.  Patient/representative verbalized understanding of discharge instructions and all questions answered.  Patient discharged from [facility] in stable condition.    Jaquelin Rodriguez RN    Administrations This Visit     inFLIXimab (REMICADE) 600 mg in sodium chloride 0.9 % 335 mL infusion     Admin Date  03/08/2019 Action  New Bag Dose  600 mg Rate  167.5 mL/hr Route  Intravenous Administered By  Jaquelin Rodriguez RN                /72   Pulse 69   Temp 97.7  F (36.5  C) (Oral)   Resp 16   Wt 56.4 kg (124 lb 4.8 oz)   SpO2 100%   BMI 20.68 kg/m

## 2019-03-08 NOTE — PATIENT INSTRUCTIONS
Dear Emil Garcia    Thank you for choosing [facility] ([facility]) for your infusion.  The following information is a summary of our appointment as well as important reminders.      EDUCATION POST BIOLOGICAL/CHEMOTHERAPY INFUSION  Call the triage nurse at your clinic or seek medical attention if you have chills and/or temperature greater than or equal to 100.5, uncontrolled nausea/vomiting, diarrhea, constipation, dizziness, shortness of breath, chest pain, heart palpitations, weakness or any other new or concerning symptoms, questions or concerns.  You can not have any live virus vaccines prior to or during treatment or up to 6 months post infusion.  If you have an upcoming surgery, medical procedure or dental procedure during treatment, this should be discussed with your ordering physician and your surgeon/dentist.  If you are having any concerning symptom, if you are unsure if you should get your next infusion or wish to speak to a provider before your next infusion, please call your care coordinator or triage nurse at your clinic to notify them so we can adequately serve you.    Additional information: you received your infusion of Remicade 600 mg via IV today.       We look forward in seeing you on your next appointment here at [facility].  Please don t hesitate to call us at [phone] to reschedule any of your appointments or to speak with one of the [facility] registered nurses.  It was a pleasure taking care of you today.    Sincerely,  Jaquelin Rodriguez RN  [facility]  [address] [place] [address]  [id number]  Phone:  [phone]      Infliximab Solution for injection  What is this medicine?  INFLIXIMAB (in FLIX i mab) is used to treat Crohn's disease and ulcerative colitis. It is also used to treat ankylosing spondylitis, psoriasis, and some forms of arthritis.  This medicine may be  used for other purposes; ask your health care provider or pharmacist if you have questions.  What should I tell my health care provider before I take this medicine?  They need to know if you have any of these conditions:    diabetes    exposure to tuberculosis    heart failure    hepatitis or liver disease    immune system problems    infection    lung or breathing disease, like COPD    multiple sclerosis    current or past resident of [place]    seizure disorder    an unusual or allergic reaction to infliximab, mouse proteins, other medicines, foods, dyes, or preservatives    pregnant or trying to get pregnant    breast-feeding  How should I use this medicine?  This medicine is for injection into a vein. It is usually given by a health care professional in a hospital or clinic setting.  A special MedGuide will be given to you by the pharmacist with each prescription and refill. Be sure to read this information carefully each time.  Talk to your pediatrician regarding the use of this medicine in children. Special care may be needed.  Overdosage: If you think you have taken too much of this medicine contact a poison control center or emergency room at once.  NOTE: This medicine is only for you. Do not share this medicine with others.  What if I miss a dose?  It is important not to miss your dose. Call your doctor or health care professional if you are unable to keep an appointment.  What may interact with this medicine?  Do not take this medicine with any of the following medications:    anakinra    rilonacept  This medicine may also interact with the following medications:    vaccines  This list may not describe all possible interactions. Give your health care provider a list of all the medicines, herbs, non-prescription drugs, or dietary supplements you use. Also tell them if you smoke, drink alcohol, or use illegal drugs. Some items may interact with your medicine.  What should I watch  for while using this medicine?  Visit your doctor or health care professional for regular checks on your progress.  If you get a cold or other infection while receiving this medicine, call your doctor or health care professional. Do not treat yourself. This medicine may decrease your body's ability to fight infections. Before beginning therapy, your doctor may do a test to see if you have been exposed to tuberculosis.  This medicine may make the symptoms of heart failure worse in some patients. If you notice symptoms such as increased shortness of breath or swelling of the ankles or legs, contact your health care provider right away.  If you are going to have surgery or dental work, tell your health care professional or dentist that you have received this medicine.  If you take this medicine for plaque psoriasis, stay out of the sun. If you cannot avoid being in the sun, wear protective clothing and use sunscreen. Do not use sun lamps or tanning beds/booths.  What side effects may I notice from receiving this medicine?  Side effects that you should report to your doctor or health care professional as soon as possible:    allergic reactions like skin rash, itching or hives, swelling of the face, lips, or tongue    chest pain    fever or chills, usually related to the infusion    muscle or joint pain    red, scaly patches or raised bumps on the skin    signs of infection - fever or chills, cough, sore throat, pain or difficulty passing urine    swollen lymph nodes in the neck, underarm, or groin areas    unexplained weight loss    unusual bleeding or bruising    unusually weak or tired    yellowing of the eyes or skin  Side effects that usually do not require medical attention (report to your doctor or health care professional if they continue or are bothersome):    headache    heartburn or stomach pain    nausea, vomiting  This list may not describe all possible side effects. Call your doctor for medical advice about  side effects. You may report side effects to FDA at 5-475-FDA-0139.  Where should I keep my medicine?  This drug is given in a hospital or clinic and will not be stored at home.  NOTE:This sheet is a summary. It may not cover all possible information. If you have questions about this medicine, talk to your doctor, pharmacist, or health care provider. Copyright  2016 [employer]            Budesonide gastro-resistant capsules and extended-release tablets  Brand Names: Entocort EC, UCERIS  What is this medicine?  BUDESONIDE (bue WAGNER oh nide) is a corticosteroid. It is used in the treatment of Crohn's disease and ulcerative colitis which are types of inflammatory bowel disease.  How should I use this medicine?  Take this medicine by mouth with a glass of water. Follow the directions on the prescription label. Do not cut, chew, crush, or break open this medicine. Take your dose in the morning. Take your doses at regular intervals. Do not take your medicine more often than directed. Do not stop taking this medicine except on the advice of your doctor or health care professional.  Talk to your pediatrician regarding the use of this medicine in children. Special care may be needed.  What side effects may I notice from receiving this medicine?  Side effects that you should report to your doctor or health care professional as soon as possible:    allergic reactions like skin rash, itching or hives, swelling of the face, lips, or tongue    changes in vision    fever, sore throat, sneezing, cough, or other signs of infection, wounds that will not heal    frequent passing of urine    increased thirst    mental depression, mood swings, mistaken feelings of self-importance or of being mistreated    pain in hips, back, ribs, arms, shoulders, or legs    severe stomach pain    swelling of feet or lower legs    unusually weak or tired    vomiting  Side effects that usually do not require medical attention (report to your doctor or  health care professional if they continue or are bothersome):    headache    increased appetite    nausea    skin problems, acne, thin and shiny skin  What may interact with this medicine?  Do not take this medicine with any of the following medications:    mifepristone  This medicine may also interact with the following medications:    anastrozole    antacids    cimetidine    grapefruit juice    ketoconazole  What if I miss a dose?  If you miss a dose, take it as soon as you can. If it is almost time for your next dose, take only that dose. Do not take double or extra doses.  Where should I keep my medicine?  Keep out of the reach of children.  Store at room temperature between 15 and 30 degrees C (59 and 86 degrees F). Keep container tightly closed. Throw away any unused medicine after the expiration date.  What should I tell my health care provider before I take this medicine?  They need to know if you have any of these conditions:    any active infection    cataracts    diabetes    immune system problems    glaucoma    high blood pressure    history of stomach bleeding or stomach ulcers    liver disease    osteopetrosis    an unusual or allergic reaction to budesonide, other corticosteroids, medicines, foods, dyes, or preservatives    pregnant or trying to get pregnant    breast-feeding  What should I watch for while using this medicine?  This medicine may increase your risk of getting an infection. Stay away from people who are sick. Tell your doctor or health care professional if you are around anyone with measles or chickenpox.  You may need to avoid receiving certain vaccines or may need to have changes in the vaccination schedules to ensure adequate protection from certain diseases. Make sure to tell your doctor or health care professional that you are taking this medicine before receiving any vaccine.  If you are going to have surgery, tell your doctor or health care professional that you are taking this  medicine.  This medicine can affect blood sugar levels. If you have diabetes, check with your doctor or health care professional before you change your diet or the dose of your diabetic medicine.  Alcohol, grapefruit, and grapefruit juice can increase the risk of getting serious side effects while you are taking this medicine. Avoid grapefruit, grapefruit juice, and alcoholic drinks while taking this medicine.  NOTE:This sheet is a summary. It may not cover all possible information. If you have questions about this medicine, talk to your doctor, pharmacist, or health care provider. Copyright  2018 [employer]

## 2019-03-31 RX ORDER — MESALAMINE 1.2 G/1
4800 TABLET, DELAYED RELEASE ORAL DAILY
Qty: 360 TABLET | OUTPATIENT
Start: 2019-03-31

## 2019-04-05 ENCOUNTER — INFUSION THERAPY VISIT (OUTPATIENT)
Dept: INFUSION THERAPY | Facility: CLINIC | Age: 29
End: 2019-04-05
Attending: INTERNAL MEDICINE
Payer: COMMERCIAL

## 2019-04-05 VITALS
RESPIRATION RATE: 16 BRPM | TEMPERATURE: 98.5 F | BODY MASS INDEX: 20.2 KG/M2 | HEART RATE: 80 BPM | WEIGHT: 121.4 LBS | OXYGEN SATURATION: 98 % | DIASTOLIC BLOOD PRESSURE: 68 MMHG | SYSTOLIC BLOOD PRESSURE: 100 MMHG

## 2019-04-05 DIAGNOSIS — K50.90 CROHN'S DISEASE WITHOUT COMPLICATION, UNSPECIFIED GASTROINTESTINAL TRACT LOCATION (H): Primary | ICD-10-CM

## 2019-04-05 LAB
ALBUMIN SERPL-MCNC: 3.9 G/DL (ref 3.4–5)
ALP SERPL-CCNC: 57 U/L (ref 40–150)
ALT SERPL W P-5'-P-CCNC: 25 U/L (ref 0–70)
AST SERPL W P-5'-P-CCNC: 16 U/L (ref 0–45)
BASOPHILS # BLD AUTO: 0 10E9/L (ref 0–0.2)
BASOPHILS NFR BLD AUTO: 0.2 %
BILIRUB DIRECT SERPL-MCNC: 0.3 MG/DL (ref 0–0.2)
BILIRUB SERPL-MCNC: 1.6 MG/DL (ref 0.2–1.3)
DIFFERENTIAL METHOD BLD: NORMAL
EOSINOPHIL # BLD AUTO: 0.1 10E9/L (ref 0–0.7)
EOSINOPHIL NFR BLD AUTO: 2.1 %
ERYTHROCYTE [DISTWIDTH] IN BLOOD BY AUTOMATED COUNT: 11.5 % (ref 10–15)
HCT VFR BLD AUTO: 43.8 % (ref 40–53)
HGB BLD-MCNC: 15.9 G/DL (ref 13.3–17.7)
IMM GRANULOCYTES # BLD: 0 10E9/L (ref 0–0.4)
IMM GRANULOCYTES NFR BLD: 0.4 %
LYMPHOCYTES # BLD AUTO: 2.8 10E9/L (ref 0.8–5.3)
LYMPHOCYTES NFR BLD AUTO: 49.7 %
MCH RBC QN AUTO: 32.3 PG (ref 26.5–33)
MCHC RBC AUTO-ENTMCNC: 36.3 G/DL (ref 31.5–36.5)
MCV RBC AUTO: 89 FL (ref 78–100)
MONOCYTES # BLD AUTO: 0.4 10E9/L (ref 0–1.3)
MONOCYTES NFR BLD AUTO: 6.9 %
NEUTROPHILS # BLD AUTO: 2.3 10E9/L (ref 1.6–8.3)
NEUTROPHILS NFR BLD AUTO: 40.7 %
NRBC # BLD AUTO: 0 10*3/UL
NRBC BLD AUTO-RTO: 0 /100
PLATELET # BLD AUTO: 262 10E9/L (ref 150–450)
PROT SERPL-MCNC: 7.4 G/DL (ref 6.8–8.8)
RBC # BLD AUTO: 4.92 10E12/L (ref 4.4–5.9)
WBC # BLD AUTO: 5.7 10E9/L (ref 4–11)

## 2019-04-05 PROCEDURE — 80076 HEPATIC FUNCTION PANEL: CPT | Performed by: INTERNAL MEDICINE

## 2019-04-05 PROCEDURE — 96413 CHEMO IV INFUSION 1 HR: CPT

## 2019-04-05 PROCEDURE — 25800030 ZZH RX IP 258 OP 636: Mod: ZF | Performed by: INTERNAL MEDICINE

## 2019-04-05 PROCEDURE — 25000128 H RX IP 250 OP 636: Mod: ZF | Performed by: INTERNAL MEDICINE

## 2019-04-05 PROCEDURE — 85025 COMPLETE CBC W/AUTO DIFF WBC: CPT | Performed by: INTERNAL MEDICINE

## 2019-04-05 PROCEDURE — 96415 CHEMO IV INFUSION ADDL HR: CPT

## 2019-04-05 RX ORDER — DIPHENHYDRAMINE HCL 25 MG
25 CAPSULE ORAL ONCE
Status: CANCELLED
Start: 2019-04-06 | End: 2019-04-06

## 2019-04-05 RX ORDER — METHYLPREDNISOLONE SODIUM SUCCINATE 125 MG/2ML
125 INJECTION, POWDER, LYOPHILIZED, FOR SOLUTION INTRAMUSCULAR; INTRAVENOUS ONCE
Status: CANCELLED | OUTPATIENT
Start: 2019-04-06 | End: 2019-04-06

## 2019-04-05 RX ORDER — ACETAMINOPHEN 325 MG/1
650 TABLET ORAL ONCE
Status: CANCELLED
Start: 2019-04-06 | End: 2019-04-06

## 2019-04-05 RX ADMIN — INFLIXIMAB 600 MG: 100 INJECTION, POWDER, LYOPHILIZED, FOR SOLUTION INTRAVENOUS at 12:28

## 2019-04-05 RX ADMIN — SODIUM CHLORIDE 50 ML: 9 INJECTION, SOLUTION INTRAVENOUS at 12:28

## 2019-04-05 NOTE — PROGRESS NOTES
Nursing Note  Emil Garcia presents [facility] for:   Chief Complaint   Patient presents with     Infusion     Remicade (infliximab)     During [facility]'s [facility] appointment, orders from Dr. Ariel Medrano were completed.  Frequency: monthly    Progress note:  Patient identification verified by name and date of birth.  Assessment completed.  Vitals recorded in Doc Flowsheets.  Patient was provided with education regarding infusion and possible side effects.  Patient verbalized understanding.      needed: No  Premedications: historically patient has not taken pre-medications prior to infusion.  Infusion Rates: 167 ml/hr., over 2 hours  Approximate Infusion length:3 hours.   Labs: were drawn per orders and then faxed per orders to Dr. Marcos Bowles, fax [phone]  Vascular access: peripheral IV placed today.  Treatment Conditions: patient denies fever, chills, signs of infection, recent illness, on antibiotics, productive cough or elevated temperature.  Patient tolerated infusion: well.    Drug Waste Record? No     Discharge Plan:   Follow up plan of care with: ongoing infusions at [facility].  Discharge instructions were reviewed with patient.  Patient/representative verbalized understanding of discharge instructions and all questions answered.  Patient discharged from [facility] in stable condition.    Karlene Cabral RN    Administrations This Visit     0.9% sodium chloride BOLUS     Admin Date  04/05/2019 Action  New Bag Dose  50 mL Route  Intravenous Administered By  Karlene Cabral RN          inFLIXimab (REMICADE) 600 mg in sodium chloride 0.9 % 335 mL infusion     Admin Date  04/05/2019 Action  New Bag Dose  600 mg Rate  167.5 mL/hr Route  Intravenous Administered By  Karlene Cabral RN              /70   Pulse 87   Temp 98.5  F (36.9  C) (Oral)    "Resp 16   Wt 55.1 kg (121 lb 6.4 oz)   SpO2 98%   BMI 20.20 kg/m      PRIOR TO INFUSION OF BIOLOGICAL MEDICATIONS OR ANY OF THESE AS LISTED: Remicaide (infliximab) \".rheumbiologicalchecklist\"    Prior to Infusion of biological medications or any of these as listed:    1. Elevated temperature, fever, chills, productive cough or abnormal vital signs, night sweats, coughing up blood or sputum, no appetite or abnormal vital signs : NO    2. Open wounds or new incisions: NO    3. Recent hospitalization: NO    4.  Recent surgeries:  NO    5. Any upcoming surgeries or dental procedures?:NO    6. Any current or recent bouts of illness or infection? On any antibiotics? : NO    7. Any new, sudden or worsening abdominal pain :NO    8. Vaccination within 4 weeks? Patient or someone in the household is scheduled to receive vaccination? No live virus vaccines prior to or during treatment :NO    9. Any nervous system diseases [i.e. multiple sclerosis, Guillain-[place], seizures, neurological  changes]: NO    10. Pregnant or breast feeding; or plans on pregnancy in the future: NO    11. Signs of worsening depression or suicidal ideations while taking benlysta:NO    12. New-onset medical symptoms: NO    13.  New cancer diagnosis or on chemotherapy or radiation NO    14.  Evaluate for any sign of active TB [Unexplained weight loss, Loss of appetite, Night sweats, Fever, Fatigue, Chills, Coughing for 3 weeks or longer, Hemoptysis (coughing up blood), Chest pain]: NO    **Note: If answered yes to any of the above, hold the infusion and contact ordering rheumatologist or on-call rheumatologist.   "

## 2019-04-19 ENCOUNTER — TRANSFERRED RECORDS (OUTPATIENT)
Dept: HEALTH INFORMATION MANAGEMENT | Facility: CLINIC | Age: 29
End: 2019-04-19

## 2019-04-30 ENCOUNTER — OFFICE VISIT (OUTPATIENT)
Dept: INTERNAL MEDICINE | Facility: CLINIC | Age: 29
End: 2019-04-30
Payer: COMMERCIAL

## 2019-04-30 VITALS
SYSTOLIC BLOOD PRESSURE: 96 MMHG | DIASTOLIC BLOOD PRESSURE: 59 MMHG | WEIGHT: 123.6 LBS | HEART RATE: 86 BPM | BODY MASS INDEX: 20.57 KG/M2

## 2019-04-30 DIAGNOSIS — K50.10 CROHN'S DISEASE OF LARGE INTESTINE WITHOUT COMPLICATION (H): Primary | ICD-10-CM

## 2019-04-30 ASSESSMENT — PAIN SCALES - GENERAL: PAINLEVEL: NO PAIN (0)

## 2019-04-30 NOTE — PATIENT INSTRUCTIONS
[facility] Medication Refill Request Information:  * Please contact your pharmacy regarding ANY request for medication refills.  ** [facility] Prescription Fax = [phone]  * Please allow 3 business days for routine medication refills.  * Please allow 5 business days for controlled substance medication refills.     [facility] Test Result notification information:  *You will be notified with in 7-10 days of your appointment day regarding the results of your test.  If you are on MyChart you will be notified as soon as the provider has reviewed the results and signed off on them.    [facility]: [phone]

## 2019-04-30 NOTE — NURSING NOTE
Chief Complaint   Patient presents with     Mass     pt states he has a lump in his right arm pit, itchy       Dari Aleman CMA at 7:29 AM on 4/30/2019.

## 2019-05-03 ENCOUNTER — INFUSION THERAPY VISIT (OUTPATIENT)
Dept: INFUSION THERAPY | Facility: CLINIC | Age: 29
End: 2019-05-03
Attending: INTERNAL MEDICINE
Payer: COMMERCIAL

## 2019-05-03 VITALS
BODY MASS INDEX: 20.09 KG/M2 | TEMPERATURE: 97.3 F | HEART RATE: 87 BPM | SYSTOLIC BLOOD PRESSURE: 104 MMHG | DIASTOLIC BLOOD PRESSURE: 68 MMHG | RESPIRATION RATE: 16 BRPM | OXYGEN SATURATION: 100 % | WEIGHT: 120.7 LBS

## 2019-05-03 DIAGNOSIS — K50.90 CROHN'S DISEASE WITHOUT COMPLICATION, UNSPECIFIED GASTROINTESTINAL TRACT LOCATION (H): Primary | ICD-10-CM

## 2019-05-03 LAB
ALBUMIN SERPL-MCNC: 4.3 G/DL (ref 3.4–5)
ALP SERPL-CCNC: 71 U/L (ref 40–150)
ALT SERPL W P-5'-P-CCNC: 29 U/L (ref 0–70)
AST SERPL W P-5'-P-CCNC: 19 U/L (ref 0–45)
BASOPHILS # BLD AUTO: 0 10E9/L (ref 0–0.2)
BASOPHILS NFR BLD AUTO: 0.3 %
BILIRUB DIRECT SERPL-MCNC: 0.2 MG/DL (ref 0–0.2)
BILIRUB SERPL-MCNC: 1.6 MG/DL (ref 0.2–1.3)
DIFFERENTIAL METHOD BLD: NORMAL
EOSINOPHIL # BLD AUTO: 0.1 10E9/L (ref 0–0.7)
EOSINOPHIL NFR BLD AUTO: 1.3 %
ERYTHROCYTE [DISTWIDTH] IN BLOOD BY AUTOMATED COUNT: 11.9 % (ref 10–15)
HCT VFR BLD AUTO: 46.7 % (ref 40–53)
HGB BLD-MCNC: 16.5 G/DL (ref 13.3–17.7)
IMM GRANULOCYTES # BLD: 0 10E9/L (ref 0–0.4)
IMM GRANULOCYTES NFR BLD: 0.6 %
LYMPHOCYTES # BLD AUTO: 3.1 10E9/L (ref 0.8–5.3)
LYMPHOCYTES NFR BLD AUTO: 44.6 %
MCH RBC QN AUTO: 32.1 PG (ref 26.5–33)
MCHC RBC AUTO-ENTMCNC: 35.3 G/DL (ref 31.5–36.5)
MCV RBC AUTO: 91 FL (ref 78–100)
MONOCYTES # BLD AUTO: 0.5 10E9/L (ref 0–1.3)
MONOCYTES NFR BLD AUTO: 7.7 %
NEUTROPHILS # BLD AUTO: 3.2 10E9/L (ref 1.6–8.3)
NEUTROPHILS NFR BLD AUTO: 45.5 %
NRBC # BLD AUTO: 0 10*3/UL
NRBC BLD AUTO-RTO: 0 /100
PLATELET # BLD AUTO: 297 10E9/L (ref 150–450)
PROT SERPL-MCNC: 8.4 G/DL (ref 6.8–8.8)
RBC # BLD AUTO: 5.14 10E12/L (ref 4.4–5.9)
WBC # BLD AUTO: 7 10E9/L (ref 4–11)

## 2019-05-03 PROCEDURE — 96415 CHEMO IV INFUSION ADDL HR: CPT

## 2019-05-03 PROCEDURE — 96413 CHEMO IV INFUSION 1 HR: CPT

## 2019-05-03 PROCEDURE — 80076 HEPATIC FUNCTION PANEL: CPT | Performed by: INTERNAL MEDICINE

## 2019-05-03 PROCEDURE — 25000128 H RX IP 250 OP 636: Mod: ZF | Performed by: INTERNAL MEDICINE

## 2019-05-03 PROCEDURE — 25800030 ZZH RX IP 258 OP 636: Mod: ZF | Performed by: INTERNAL MEDICINE

## 2019-05-03 PROCEDURE — 85025 COMPLETE CBC W/AUTO DIFF WBC: CPT | Performed by: INTERNAL MEDICINE

## 2019-05-03 RX ORDER — ACETAMINOPHEN 325 MG/1
650 TABLET ORAL ONCE
Status: CANCELLED
Start: 2019-05-04

## 2019-05-03 RX ORDER — DIPHENHYDRAMINE HCL 25 MG
25 CAPSULE ORAL ONCE
Status: CANCELLED
Start: 2019-05-04

## 2019-05-03 RX ORDER — METHYLPREDNISOLONE SODIUM SUCCINATE 125 MG/2ML
125 INJECTION, POWDER, LYOPHILIZED, FOR SOLUTION INTRAMUSCULAR; INTRAVENOUS ONCE
Status: CANCELLED | OUTPATIENT
Start: 2019-05-04

## 2019-05-03 RX ADMIN — INFLIXIMAB 500 MG: 100 INJECTION, POWDER, LYOPHILIZED, FOR SOLUTION INTRAVENOUS at 12:57

## 2019-05-03 NOTE — PROGRESS NOTES
Infusion nursing note:    Emil Garcia presents today to [facility] for a Remicade infusion.  During today's [facility] appointment orders from Dr. Patten were completed.  Dose # every 4 weeks    Progress note:  ID verified by name and .  Assessment completed.  Vitals were stable throughout time in [facility].  verbal education given to patient/representative regarding infusion and possible side effects.  Patient/representative verbalized understanding.    ~~~ NOTE: If the patient answers yes to any of the questions below, hold the infusion and contact ordering provider or on-call provider.    1. Have you recently had an elevated temperature, fever, chills, productive cough, coughing for 3 weeks or longer or hemoptysis,  abnormal vital signs, night sweats,  chest pain or have you noticed a decrease in your appetite, unexplained weight loss or fatigue? No  2. Do you have any open wounds or new incisions? No  3. Do you have any recent or upcoming hospitalizations, surgeries or dental procedures? No  4. Do you currently have or recently have had any signs of illness or infection or are you on any antibiotics? No  5. Have you had any new, sudden or worsening abdominal pain? No  6. Have you or anyone in your household received a live vaccination in the past 4 weeks? Please note:  No live vaccines while on biologic/chemotherapy until 6 months after the last treatment.  Patient can receive the flu vaccine (shot only) and the pneumovax.  It is optimal for the patient to get these vaccines mid cycle, but they can be given at any time as long as it is not on the day of the infusion. No  7. Have you recently been diagnosed with any new nervous system diseases (ie. Multiple sclerosis, Guillain [place], seizures, neurological changes) or cancer diagnosis? Are you on any form of radiation or chemotherapy? No  8. Are you pregnant or breast feeding or do you have plans of pregnancy in the future? No  9. Have you been having any signs of worsening  depression or suicidal ideations?  (benlysta only) No  10. Have there been any other new onset medical symptoms? No      Administrations This Visit     inFLIXimab (REMICADE) 500 mg in sodium chloride 0.9 % 305 mL infusion     Admin Date  05/03/2019 Action  New Bag Dose  500 mg Rate  152.5 mL/hr Route  Intravenous Administered By  Myesha Perez RN                /70   Pulse 89   Temp 97.3  F (36.3  C) (Oral)   Resp 16   SpO2 100%     Infusion given over approximately  2 hours (pt preference)     Discharge Plan:  Reviewed with patient.  Given biologic medication or medication hand-out. Inform patient if any fever, chills or signs of infection, new symptoms, abdominal pain, heart palpitations, shortness of breath, reaction, weakness, neurological changes, seek medical attention immediately and should not receive infusions. No live virus vaccines prior to or during treatment or up to 6 months post infusion. If the patient has an upcoming procedure or surgery, this should be discussed with the rheumatologist and surgeon or provider.    Discharge instructions were reviewed with patient Yes  Patient/representative verbalized understanding of discharge instructions and all questions answered Yes.    Discharged from [facility] at 330  with self to home.    Dora Curran RN

## 2019-05-31 ENCOUNTER — INFUSION THERAPY VISIT (OUTPATIENT)
Dept: INFUSION THERAPY | Facility: CLINIC | Age: 29
End: 2019-05-31
Attending: INTERNAL MEDICINE
Payer: COMMERCIAL

## 2019-05-31 VITALS
HEART RATE: 93 BPM | OXYGEN SATURATION: 97 % | DIASTOLIC BLOOD PRESSURE: 72 MMHG | WEIGHT: 121.7 LBS | RESPIRATION RATE: 16 BRPM | TEMPERATURE: 97.6 F | BODY MASS INDEX: 20.25 KG/M2 | SYSTOLIC BLOOD PRESSURE: 114 MMHG

## 2019-05-31 DIAGNOSIS — K50.90 CROHN'S DISEASE WITHOUT COMPLICATION, UNSPECIFIED GASTROINTESTINAL TRACT LOCATION (H): Primary | ICD-10-CM

## 2019-05-31 LAB
ALBUMIN SERPL-MCNC: 2.6 G/DL (ref 3.4–5)
ALP SERPL-CCNC: 37 U/L (ref 40–150)
ALT SERPL W P-5'-P-CCNC: 16 U/L (ref 0–70)
AST SERPL W P-5'-P-CCNC: 12 U/L (ref 0–45)
BASOPHILS # BLD AUTO: 0 10E9/L (ref 0–0.2)
BASOPHILS NFR BLD AUTO: 0.4 %
BILIRUB DIRECT SERPL-MCNC: 0.2 MG/DL (ref 0–0.2)
BILIRUB SERPL-MCNC: 1 MG/DL (ref 0.2–1.3)
DIFFERENTIAL METHOD BLD: NORMAL
EOSINOPHIL # BLD AUTO: 0.1 10E9/L (ref 0–0.7)
EOSINOPHIL NFR BLD AUTO: 1.5 %
ERYTHROCYTE [DISTWIDTH] IN BLOOD BY AUTOMATED COUNT: 11.7 % (ref 10–15)
HCT VFR BLD AUTO: 42.4 % (ref 40–53)
HGB BLD-MCNC: 15.2 G/DL (ref 13.3–17.7)
IMM GRANULOCYTES # BLD: 0 10E9/L (ref 0–0.4)
IMM GRANULOCYTES NFR BLD: 0.2 %
LYMPHOCYTES # BLD AUTO: 2.6 10E9/L (ref 0.8–5.3)
LYMPHOCYTES NFR BLD AUTO: 46.8 %
MCH RBC QN AUTO: 32.4 PG (ref 26.5–33)
MCHC RBC AUTO-ENTMCNC: 35.8 G/DL (ref 31.5–36.5)
MCV RBC AUTO: 90 FL (ref 78–100)
MONOCYTES # BLD AUTO: 0.5 10E9/L (ref 0–1.3)
MONOCYTES NFR BLD AUTO: 8.4 %
NEUTROPHILS # BLD AUTO: 2.3 10E9/L (ref 1.6–8.3)
NEUTROPHILS NFR BLD AUTO: 42.7 %
NRBC # BLD AUTO: 0 10*3/UL
NRBC BLD AUTO-RTO: 0 /100
PLATELET # BLD AUTO: 251 10E9/L (ref 150–450)
PROT SERPL-MCNC: 5.2 G/DL (ref 6.8–8.8)
RBC # BLD AUTO: 4.69 10E12/L (ref 4.4–5.9)
WBC # BLD AUTO: 5.5 10E9/L (ref 4–11)

## 2019-05-31 PROCEDURE — 85025 COMPLETE CBC W/AUTO DIFF WBC: CPT | Performed by: INTERNAL MEDICINE

## 2019-05-31 PROCEDURE — 25800030 ZZH RX IP 258 OP 636: Mod: ZF | Performed by: INTERNAL MEDICINE

## 2019-05-31 PROCEDURE — 25000128 H RX IP 250 OP 636: Mod: ZF | Performed by: INTERNAL MEDICINE

## 2019-05-31 PROCEDURE — 96413 CHEMO IV INFUSION 1 HR: CPT

## 2019-05-31 PROCEDURE — 96415 CHEMO IV INFUSION ADDL HR: CPT

## 2019-05-31 PROCEDURE — 80076 HEPATIC FUNCTION PANEL: CPT | Performed by: INTERNAL MEDICINE

## 2019-05-31 RX ORDER — ACETAMINOPHEN 325 MG/1
650 TABLET ORAL ONCE
Status: CANCELLED
Start: 2019-06-01

## 2019-05-31 RX ORDER — METHYLPREDNISOLONE SODIUM SUCCINATE 125 MG/2ML
125 INJECTION, POWDER, LYOPHILIZED, FOR SOLUTION INTRAMUSCULAR; INTRAVENOUS ONCE
Status: CANCELLED | OUTPATIENT
Start: 2019-06-01

## 2019-05-31 RX ORDER — DIPHENHYDRAMINE HCL 25 MG
25 CAPSULE ORAL ONCE
Status: CANCELLED
Start: 2019-06-01

## 2019-05-31 RX ADMIN — INFLIXIMAB 500 MG: 100 INJECTION, POWDER, LYOPHILIZED, FOR SOLUTION INTRAVENOUS at 13:26

## 2019-05-31 NOTE — PROGRESS NOTES
Nursing Note  Emil Garcia presents today to [facility] for:   Chief Complaint   Patient presents with     Infusion     remicade     During today's [facility] appointment, orders from Dr. Medrano were completed.  Frequency: every 4 weeks    Progress note:  Patient identification verified by name and date of birth.  Assessment completed.  Vitals recorded in Doc Flowsheets.  Patient was provided with education regarding infusion and possible side effects.  Patient verbalized understanding.     present during visit today: Not Applicable.    Pre Infusion Conditions: ~~~ NOTE: If the patient answers yes to any of the questions below, hold the infusion and contact ordering provider or on-call provider.    1. Have you recently had an elevated temperature, fever, chills, productive cough, coughing for 3 weeks or longer or hemoptysis,  abnormal vital signs, night sweats,  chest pain or have you noticed a decrease in your appetite, unexplained weight loss or fatigue? No  2. Do you have any open wounds or new incisions? No  3. Do you have any recent or upcoming hospitalizations, surgeries or dental procedures? No  4. Do you currently have or recently have had any signs of illness or infection or are you on any antibiotics? No  5. Have you had any new, sudden or worsening abdominal pain? No  6. Have you or anyone in your household received a live vaccination in the past 4 weeks? Please note:  No live vaccines while on biologic/chemotherapy until 6 months after the last treatment.  Patient can receive the flu vaccine (shot only) and the pneumovax.  It is optimal for the patient to get these vaccines mid cycle, but they can be given at any time as long as it is not on the day of the infusion. No  7. Have you recently been diagnosed with any new nervous system diseases (ie. Multiple sclerosis, Guillain [place], seizures, neurological changes) or cancer  diagnosis? Are you on any form of radiation or chemotherapy? No  8. Are you pregnant or breast feeding or do you have plans of pregnancy in the future? No  9. Have you been having any signs of worsening depression or suicidal ideations?  (benlysta only) No  10. Have there been any other new onset medical symptoms? No      Premedications: historically patient has not taken pre-medications prior to infusion.    Drug Waste Record: No    Infusion length and rate:  163 ml/hr.    Labs: were drawn per orders.     Vascular access: peripheral IV placed today.    Post Infusion Assessment:  Patient tolerated infusion without incident.     Discharge Plan:   Follow up plan of care with: ongoing infusions at [facility]. and primary medical doctor.  Discharge instructions were reviewed with patient.  Patient/representative verbalized understanding of discharge instructions and all questions answered.  Patient discharged from [facility] in stable condition.    Daina Cerrato RN       Administrations This Visit     inFLIXimab (REMICADE) 500 mg in sodium chloride 0.9 % 325 mL infusion     Admin Date  05/31/2019 Action  New Bag Dose  500 mg Rate  162.5 mL/hr Route  Intravenous Administered By  Daina Cerrato RN                  /72   Pulse 93   Temp 97.6  F (36.4  C) (Oral)   Resp 16   Wt 55.2 kg (121 lb 11.2 oz)   SpO2 97%   BMI 20.25 kg/m

## 2019-06-05 ENCOUNTER — TRANSFERRED RECORDS (OUTPATIENT)
Dept: HEALTH INFORMATION MANAGEMENT | Facility: CLINIC | Age: 29
End: 2019-06-05

## 2019-06-14 ENCOUNTER — MYC MEDICAL ADVICE (OUTPATIENT)
Dept: INTERNAL MEDICINE | Facility: CLINIC | Age: 29
End: 2019-06-14

## 2019-06-21 NOTE — TELEPHONE ENCOUNTER
Discussed with [facility]. [facility] is considered a hospital-based clinic, so it is billed as such. After 7/15/19, patient will no longer be able to receive remicade through [facility].     I will update PCP regarding need for home infusion if he is agreeable.    Mary Andrea RN    Agree  JL

## 2019-06-27 NOTE — TELEPHONE ENCOUNTER
PCP is agreeable to home infusions. Infusion at [facility] are an option (within infusion suite), though this is not located along the green line as preferred by patient.     [facility] was contacted regarding patient, need for infusion for July. June infusion should still be covered at [facility]. Home infusion will start PA and contact patient.    Mary Andrea RN

## 2019-06-28 ENCOUNTER — INFUSION THERAPY VISIT (OUTPATIENT)
Dept: INFUSION THERAPY | Facility: CLINIC | Age: 29
End: 2019-06-28
Attending: INTERNAL MEDICINE
Payer: COMMERCIAL

## 2019-06-28 VITALS
SYSTOLIC BLOOD PRESSURE: 109 MMHG | BODY MASS INDEX: 20.5 KG/M2 | TEMPERATURE: 98.2 F | HEART RATE: 94 BPM | WEIGHT: 123.2 LBS | OXYGEN SATURATION: 99 % | RESPIRATION RATE: 16 BRPM | DIASTOLIC BLOOD PRESSURE: 70 MMHG

## 2019-06-28 DIAGNOSIS — K50.90 CROHN'S DISEASE WITHOUT COMPLICATION, UNSPECIFIED GASTROINTESTINAL TRACT LOCATION (H): Primary | ICD-10-CM

## 2019-06-28 LAB
ALBUMIN SERPL-MCNC: 3.5 G/DL (ref 3.4–5)
ALP SERPL-CCNC: 58 U/L (ref 40–150)
ALT SERPL W P-5'-P-CCNC: 28 U/L (ref 0–70)
AST SERPL W P-5'-P-CCNC: 16 U/L (ref 0–45)
BASOPHILS # BLD AUTO: 0 10E9/L (ref 0–0.2)
BASOPHILS NFR BLD AUTO: 0.4 %
BILIRUB DIRECT SERPL-MCNC: 0.2 MG/DL (ref 0–0.2)
BILIRUB SERPL-MCNC: 1 MG/DL (ref 0.2–1.3)
DIFFERENTIAL METHOD BLD: ABNORMAL
EOSINOPHIL # BLD AUTO: 0.1 10E9/L (ref 0–0.7)
EOSINOPHIL NFR BLD AUTO: 2.5 %
ERYTHROCYTE [DISTWIDTH] IN BLOOD BY AUTOMATED COUNT: 11.7 % (ref 10–15)
HCT VFR BLD AUTO: 40 % (ref 40–53)
HGB BLD-MCNC: 14.2 G/DL (ref 13.3–17.7)
IMM GRANULOCYTES # BLD: 0 10E9/L (ref 0–0.4)
IMM GRANULOCYTES NFR BLD: 0.4 %
LYMPHOCYTES # BLD AUTO: 2.5 10E9/L (ref 0.8–5.3)
LYMPHOCYTES NFR BLD AUTO: 45.2 %
MCH RBC QN AUTO: 32.6 PG (ref 26.5–33)
MCHC RBC AUTO-ENTMCNC: 35.5 G/DL (ref 31.5–36.5)
MCV RBC AUTO: 92 FL (ref 78–100)
MONOCYTES # BLD AUTO: 0.4 10E9/L (ref 0–1.3)
MONOCYTES NFR BLD AUTO: 7.4 %
NEUTROPHILS # BLD AUTO: 2.5 10E9/L (ref 1.6–8.3)
NEUTROPHILS NFR BLD AUTO: 44.1 %
NRBC # BLD AUTO: 0 10*3/UL
NRBC BLD AUTO-RTO: 0 /100
PLATELET # BLD AUTO: 245 10E9/L (ref 150–450)
PROT SERPL-MCNC: 6.9 G/DL (ref 6.8–8.8)
RBC # BLD AUTO: 4.35 10E12/L (ref 4.4–5.9)
WBC # BLD AUTO: 5.6 10E9/L (ref 4–11)

## 2019-06-28 PROCEDURE — 80076 HEPATIC FUNCTION PANEL: CPT | Performed by: INTERNAL MEDICINE

## 2019-06-28 PROCEDURE — 25000128 H RX IP 250 OP 636: Mod: ZF | Performed by: INTERNAL MEDICINE

## 2019-06-28 PROCEDURE — 85025 COMPLETE CBC W/AUTO DIFF WBC: CPT | Performed by: INTERNAL MEDICINE

## 2019-06-28 PROCEDURE — 96415 CHEMO IV INFUSION ADDL HR: CPT

## 2019-06-28 PROCEDURE — 25800030 ZZH RX IP 258 OP 636: Mod: ZF | Performed by: INTERNAL MEDICINE

## 2019-06-28 PROCEDURE — 96413 CHEMO IV INFUSION 1 HR: CPT

## 2019-06-28 RX ORDER — ACETAMINOPHEN 325 MG/1
650 TABLET ORAL ONCE
Start: 2019-06-29

## 2019-06-28 RX ORDER — DIPHENHYDRAMINE HCL 25 MG
25 CAPSULE ORAL ONCE
Start: 2019-06-29

## 2019-06-28 RX ORDER — METHYLPREDNISOLONE SODIUM SUCCINATE 125 MG/2ML
125 INJECTION, POWDER, LYOPHILIZED, FOR SOLUTION INTRAMUSCULAR; INTRAVENOUS ONCE
OUTPATIENT
Start: 2019-06-29

## 2019-06-28 RX ADMIN — INFLIXIMAB 500 MG: 100 INJECTION, POWDER, LYOPHILIZED, FOR SOLUTION INTRAVENOUS at 13:50

## 2019-06-28 NOTE — PROGRESS NOTES
Nursing Note  Emil Garcia presents today to [facility] for:   Chief Complaint   Patient presents with     Infusion     Remicade     During [facility]'s [facility] appointment, orders from Dr. Medrano were completed.  Frequency: every 4 weeks. Patient will be getting home infusions after this appointment.    Progress note:  Patient identification verified by name and date of birth.  Assessment completed.  Vitals recorded in Doc Flowsheets.  Patient was provided with education regarding infusion and possible side effects.  Patient verbalized understanding.     present during visit today: Not Applicable.    Treatment Conditions: ~~~ NOTE: If the patient answers yes to any of the questions below, hold the infusion and contact ordering provider or on-call provider.    1. Have you recently had an elevated temperature, fever, chills, productive cough, coughing for 3 weeks or longer or hemoptysis, abnormal vital signs, night sweats,  chest pain or have you noticed a decrease in your appetite, unexplained weight loss or fatigue? No  2. Do you have any open wounds or new incisions? No  3. Do you have any recent or upcoming hospitalizations, surgeries or dental procedures? No  4. Do you currently have or recently have had any signs of illness or infection or are you on any antibiotics? No  5. Have you had any new, sudden or worsening abdominal pain? No  6. Have you or anyone in your household received a live vaccination in the past 4 weeks? Please note:  No live vaccines while on biologic/chemotherapy until 6 months after the last treatment.  Patient can receive the flu vaccine (shot only) and the pneumovax.  It is optimal for the patient to get these vaccines mid cycle, but they can be given at any time as long as it is not on the day of the infusion. No  7. Have you recently been diagnosed with any new nervous system diseases (ie. Multiple sclerosis,  Guillain [place], seizures, neurological changes) or cancer diagnosis? No  8. Are you on any form of radiation or chemotherapy? No  9. Are you pregnant or breast feeding or do you have plans of pregnancy in the future? No  10. Have you been having any signs of worsening depression or suicidal ideations?  (benlysta only) No  11. Have there been any other new onset medical symptoms? No      Premedications: historically patient has not taken pre-medications prior to infusion.    Drug Waste Record: No    Infusion length and rate:  infusion given over approximately 2 hours  163 ml/hr.    Labs: were drawn per orders. Results faxed per orders.     Vascular access: peripheral IV placed today.    Post Infusion Assessment:  Patient tolerated infusion without incident.     Discharge Plan:   Follow up plan of care with: ongoing infusions at [facility]. and primary medical doctor.  Discharge instructions were reviewed with patient.  Patient/representative verbalized understanding of discharge instructions and all questions answered.  Patient discharged from [facility] in stable condition.    Daina eCrrato RN       Administrations This Visit     inFLIXimab (REMICADE) 500 mg in sodium chloride 0.9 % 325 mL infusion     Admin Date  06/28/2019 Action  New Bag Dose  500 mg Rate  162.5 mL/hr Route  Intravenous Administered By  Daina Cerrato RN                  /70   Temp 98.2  F (36.8  C) (Oral)   Resp 16   Wt 55.9 kg (123 lb 3.2 oz)   SpO2 99%   BMI 20.50 kg/m

## 2019-07-02 ENCOUNTER — HOME INFUSION (PRE-WILLOW HOME INFUSION) (OUTPATIENT)
Dept: PHARMACY | Facility: CLINIC | Age: 29
End: 2019-07-02

## 2019-07-03 NOTE — PROGRESS NOTES
This is a recent snapshot of the patient's [facility] medical record.  For current drug dose and complete information and questions, call [phone] or In Basket pool, fv home infusion ([id number])  CSN Number:  [id number]

## 2019-07-05 ENCOUNTER — DOCUMENTATION ONLY (OUTPATIENT)
Dept: INTERNAL MEDICINE | Facility: CLINIC | Age: 29
End: 2019-07-05

## 2019-07-08 ENCOUNTER — HOME INFUSION (PRE-WILLOW HOME INFUSION) (OUTPATIENT)
Dept: PHARMACY | Facility: CLINIC | Age: 29
End: 2019-07-08

## 2019-07-09 NOTE — PROGRESS NOTES
This is a recent snapshot of the patient's [facility] medical record.  For current drug dose and complete information and questions, call [phone] or In Basket pool, fv home infusion ([id number])  CSN Number:  [id number]

## 2019-07-19 DIAGNOSIS — Z11.4 ENCOUNTER FOR SCREENING FOR HIV: Primary | ICD-10-CM

## 2019-07-29 ENCOUNTER — MEDICAL CORRESPONDENCE (OUTPATIENT)
Dept: HEALTH INFORMATION MANAGEMENT | Facility: CLINIC | Age: 29
End: 2019-07-29

## 2019-07-29 LAB
ALBUMIN SERPL-MCNC: 3.8 G/DL (ref 3.4–5)
ALP SERPL-CCNC: 63 U/L (ref 40–150)
ALT SERPL W P-5'-P-CCNC: 19 U/L (ref 0–70)
AST SERPL W P-5'-P-CCNC: 11 U/L (ref 0–45)
BASOPHILS # BLD AUTO: 0 10E9/L (ref 0–0.2)
BASOPHILS NFR BLD AUTO: 0.2 %
BILIRUB DIRECT SERPL-MCNC: 0.2 MG/DL (ref 0–0.2)
BILIRUB SERPL-MCNC: 1.3 MG/DL (ref 0.2–1.3)
DIFFERENTIAL METHOD BLD: NORMAL
EOSINOPHIL # BLD AUTO: 0.1 10E9/L (ref 0–0.7)
EOSINOPHIL NFR BLD AUTO: 2.5 %
ERYTHROCYTE [DISTWIDTH] IN BLOOD BY AUTOMATED COUNT: 11.8 % (ref 10–15)
HCT VFR BLD AUTO: 43.7 % (ref 40–53)
HGB BLD-MCNC: 15.5 G/DL (ref 13.3–17.7)
IMM GRANULOCYTES # BLD: 0 10E9/L (ref 0–0.4)
IMM GRANULOCYTES NFR BLD: 0.2 %
LYMPHOCYTES # BLD AUTO: 2.8 10E9/L (ref 0.8–5.3)
LYMPHOCYTES NFR BLD AUTO: 50.5 %
MCH RBC QN AUTO: 32 PG (ref 26.5–33)
MCHC RBC AUTO-ENTMCNC: 35.5 G/DL (ref 31.5–36.5)
MCV RBC AUTO: 90 FL (ref 78–100)
MONOCYTES # BLD AUTO: 0.3 10E9/L (ref 0–1.3)
MONOCYTES NFR BLD AUTO: 5.5 %
NEUTROPHILS # BLD AUTO: 2.3 10E9/L (ref 1.6–8.3)
NEUTROPHILS NFR BLD AUTO: 41.1 %
NRBC # BLD AUTO: 0 10*3/UL
NRBC BLD AUTO-RTO: 0 /100
PLATELET # BLD AUTO: 263 10E9/L (ref 150–450)
PROT SERPL-MCNC: 7.4 G/DL (ref 6.8–8.8)
RBC # BLD AUTO: 4.85 10E12/L (ref 4.4–5.9)
WBC # BLD AUTO: 5.6 10E9/L (ref 4–11)

## 2019-07-29 PROCEDURE — 80076 HEPATIC FUNCTION PANEL: CPT | Performed by: INTERNAL MEDICINE

## 2019-07-29 PROCEDURE — 85025 COMPLETE CBC W/AUTO DIFF WBC: CPT | Performed by: INTERNAL MEDICINE

## 2019-08-01 ENCOUNTER — APPOINTMENT (OUTPATIENT)
Dept: LAB | Facility: CLINIC | Age: 29
End: 2019-08-01
Attending: INTERNAL MEDICINE
Payer: COMMERCIAL

## 2019-08-03 NOTE — TELEPHONE ENCOUNTER
[facility] Call Center    Phone Message    May a detailed message be left on voicemail: yes    Reason for Call: Other: requesting results for stool sample/ ASAP      Action Taken: Message routed to:  [facility] Gastro     No acute events overnight. Pain controlled. Slept well. Razo pulled out this morning, pt has voided. Up with hand held assist. Cooperative with cares. Will monitor.

## 2019-08-06 ENCOUNTER — MEDICAL CORRESPONDENCE (OUTPATIENT)
Dept: HEALTH INFORMATION MANAGEMENT | Facility: CLINIC | Age: 29
End: 2019-08-06

## 2019-08-07 ENCOUNTER — MEDICAL CORRESPONDENCE (OUTPATIENT)
Dept: HEALTH INFORMATION MANAGEMENT | Facility: CLINIC | Age: 29
End: 2019-08-07

## 2019-08-30 ENCOUNTER — HOME INFUSION (PRE-WILLOW HOME INFUSION) (OUTPATIENT)
Dept: PHARMACY | Facility: CLINIC | Age: 29
End: 2019-08-30

## 2019-09-03 ENCOUNTER — MEDICAL CORRESPONDENCE (OUTPATIENT)
Dept: HEALTH INFORMATION MANAGEMENT | Facility: CLINIC | Age: 29
End: 2019-09-03

## 2019-09-03 LAB
ALBUMIN SERPL-MCNC: 4 G/DL (ref 3.4–5)
ALP SERPL-CCNC: 68 U/L (ref 40–150)
ALT SERPL W P-5'-P-CCNC: 31 U/L (ref 0–70)
AST SERPL W P-5'-P-CCNC: 19 U/L (ref 0–45)
BILIRUB DIRECT SERPL-MCNC: 0.2 MG/DL (ref 0–0.2)
BILIRUB SERPL-MCNC: 1.7 MG/DL (ref 0.2–1.3)
ERYTHROCYTE [DISTWIDTH] IN BLOOD BY AUTOMATED COUNT: 11.9 % (ref 10–15)
HCT VFR BLD AUTO: 46.7 % (ref 40–53)
HGB BLD-MCNC: 16.9 G/DL (ref 13.3–17.7)
MCH RBC QN AUTO: 32.1 PG (ref 26.5–33)
MCHC RBC AUTO-ENTMCNC: 36.2 G/DL (ref 31.5–36.5)
MCV RBC AUTO: 89 FL (ref 78–100)
PLATELET # BLD AUTO: 298 10E9/L (ref 150–450)
PROT SERPL-MCNC: 8.2 G/DL (ref 6.8–8.8)
RBC # BLD AUTO: 5.26 10E12/L (ref 4.4–5.9)
WBC # BLD AUTO: 6.4 10E9/L (ref 4–11)

## 2019-09-03 PROCEDURE — 85027 COMPLETE CBC AUTOMATED: CPT | Performed by: INTERNAL MEDICINE

## 2019-09-03 PROCEDURE — 80076 HEPATIC FUNCTION PANEL: CPT | Performed by: INTERNAL MEDICINE

## 2019-09-03 NOTE — PROGRESS NOTES
This is a recent snapshot of the patient's [facility] medical record.  For current drug dose and complete information and questions, call [phone] or In [facility] pool, fv home infusion ([id number])  CSN Number:  [id number]

## 2019-09-30 ENCOUNTER — HOME INFUSION (PRE-WILLOW HOME INFUSION) (OUTPATIENT)
Dept: PHARMACY | Facility: CLINIC | Age: 29
End: 2019-09-30

## 2019-10-01 ENCOUNTER — MEDICAL CORRESPONDENCE (OUTPATIENT)
Dept: HEALTH INFORMATION MANAGEMENT | Facility: CLINIC | Age: 29
End: 2019-10-01

## 2019-10-01 LAB
ALBUMIN SERPL-MCNC: 3.8 G/DL (ref 3.4–5)
ALP SERPL-CCNC: 61 U/L (ref 40–150)
ALT SERPL W P-5'-P-CCNC: 27 U/L (ref 0–70)
AST SERPL W P-5'-P-CCNC: 17 U/L (ref 0–45)
BASOPHILS # BLD AUTO: 0 10E9/L (ref 0–0.2)
BASOPHILS NFR BLD AUTO: 0.2 %
BILIRUB DIRECT SERPL-MCNC: 0.2 MG/DL (ref 0–0.2)
BILIRUB SERPL-MCNC: 1.6 MG/DL (ref 0.2–1.3)
DIFFERENTIAL METHOD BLD: NORMAL
EOSINOPHIL # BLD AUTO: 0.2 10E9/L (ref 0–0.7)
EOSINOPHIL NFR BLD AUTO: 2.8 %
ERYTHROCYTE [DISTWIDTH] IN BLOOD BY AUTOMATED COUNT: 12.1 % (ref 10–15)
HCT VFR BLD AUTO: 44.1 % (ref 40–53)
HGB BLD-MCNC: 15.7 G/DL (ref 13.3–17.7)
IMM GRANULOCYTES # BLD: 0 10E9/L (ref 0–0.4)
IMM GRANULOCYTES NFR BLD: 0.2 %
LYMPHOCYTES # BLD AUTO: 2.6 10E9/L (ref 0.8–5.3)
LYMPHOCYTES NFR BLD AUTO: 48.9 %
MCH RBC QN AUTO: 32.4 PG (ref 26.5–33)
MCHC RBC AUTO-ENTMCNC: 35.6 G/DL (ref 31.5–36.5)
MCV RBC AUTO: 91 FL (ref 78–100)
MONOCYTES # BLD AUTO: 0.4 10E9/L (ref 0–1.3)
MONOCYTES NFR BLD AUTO: 7.4 %
NEUTROPHILS # BLD AUTO: 2.2 10E9/L (ref 1.6–8.3)
NEUTROPHILS NFR BLD AUTO: 40.5 %
NRBC # BLD AUTO: 0 10*3/UL
NRBC BLD AUTO-RTO: 0 /100
PLATELET # BLD AUTO: 266 10E9/L (ref 150–450)
PROT SERPL-MCNC: 7.7 G/DL (ref 6.8–8.8)
RBC # BLD AUTO: 4.85 10E12/L (ref 4.4–5.9)
WBC # BLD AUTO: 5.4 10E9/L (ref 4–11)

## 2019-10-01 PROCEDURE — 85025 COMPLETE CBC W/AUTO DIFF WBC: CPT | Performed by: INTERNAL MEDICINE

## 2019-10-01 PROCEDURE — 80076 HEPATIC FUNCTION PANEL: CPT | Performed by: INTERNAL MEDICINE

## 2019-10-01 NOTE — PROGRESS NOTES
This is a recent snapshot of the patient's [facility] medical record.  For current drug dose and complete information and questions, call [phone] or In Basket pool, fv home infusion ([id number])  CSN Number:  [id number]

## 2019-10-29 ENCOUNTER — HOME INFUSION (PRE-WILLOW HOME INFUSION) (OUTPATIENT)
Dept: PHARMACY | Facility: CLINIC | Age: 29
End: 2019-10-29

## 2019-10-30 NOTE — PROGRESS NOTES
This is a recent snapshot of the patient's [facility] medical record.  For current drug dose and complete information and questions, call [phone] or In Basket pool, fv home infusion ([id number])  CSN Number:  [id number]

## 2019-11-01 ENCOUNTER — HOME INFUSION (PRE-WILLOW HOME INFUSION) (OUTPATIENT)
Dept: PHARMACY | Facility: CLINIC | Age: 29
End: 2019-11-01

## 2019-11-02 ENCOUNTER — HEALTH MAINTENANCE LETTER (OUTPATIENT)
Age: 29
End: 2019-11-02

## 2019-11-04 ENCOUNTER — MEDICAL CORRESPONDENCE (OUTPATIENT)
Dept: PHARMACY | Facility: CLINIC | Age: 29
End: 2019-11-04

## 2019-11-04 LAB
ALBUMIN SERPL-MCNC: 4.2 G/DL (ref 3.4–5)
ALP SERPL-CCNC: 65 U/L (ref 40–150)
ALT SERPL W P-5'-P-CCNC: 30 U/L (ref 0–70)
AST SERPL W P-5'-P-CCNC: 24 U/L (ref 0–45)
BASOPHILS # BLD AUTO: 0 10E9/L (ref 0–0.2)
BASOPHILS NFR BLD AUTO: 0.2 %
BILIRUB DIRECT SERPL-MCNC: 0.2 MG/DL (ref 0–0.2)
BILIRUB SERPL-MCNC: 1.5 MG/DL (ref 0.2–1.3)
DIFFERENTIAL METHOD BLD: NORMAL
EOSINOPHIL # BLD AUTO: 0.1 10E9/L (ref 0–0.7)
EOSINOPHIL NFR BLD AUTO: 1.8 %
ERYTHROCYTE [DISTWIDTH] IN BLOOD BY AUTOMATED COUNT: 11.8 % (ref 10–15)
HCT VFR BLD AUTO: 46.1 % (ref 40–53)
HGB BLD-MCNC: 16.4 G/DL (ref 13.3–17.7)
IMM GRANULOCYTES # BLD: 0 10E9/L (ref 0–0.4)
IMM GRANULOCYTES NFR BLD: 0.2 %
LYMPHOCYTES # BLD AUTO: 2.8 10E9/L (ref 0.8–5.3)
LYMPHOCYTES NFR BLD AUTO: 46.7 %
MCH RBC QN AUTO: 32.5 PG (ref 26.5–33)
MCHC RBC AUTO-ENTMCNC: 35.6 G/DL (ref 31.5–36.5)
MCV RBC AUTO: 92 FL (ref 78–100)
MONOCYTES # BLD AUTO: 0.5 10E9/L (ref 0–1.3)
MONOCYTES NFR BLD AUTO: 7.5 %
NEUTROPHILS # BLD AUTO: 2.6 10E9/L (ref 1.6–8.3)
NEUTROPHILS NFR BLD AUTO: 43.6 %
NRBC # BLD AUTO: 0 10*3/UL
NRBC BLD AUTO-RTO: 0 /100
PLATELET # BLD AUTO: 282 10E9/L (ref 150–450)
PROT SERPL-MCNC: 8 G/DL (ref 6.8–8.8)
RBC # BLD AUTO: 5.04 10E12/L (ref 4.4–5.9)
WBC # BLD AUTO: 6 10E9/L (ref 4–11)

## 2019-11-04 PROCEDURE — 85025 COMPLETE CBC W/AUTO DIFF WBC: CPT | Performed by: INTERNAL MEDICINE

## 2019-11-04 PROCEDURE — 80076 HEPATIC FUNCTION PANEL: CPT | Performed by: INTERNAL MEDICINE

## 2019-11-04 NOTE — PROGRESS NOTES
This is a recent snapshot of the patient's [facility] medical record.  For current drug dose and complete information and questions, call [phone] or In Basket pool, fv home infusion ([id number])  CSN Number:  [id number]

## 2019-11-11 ASSESSMENT — ENCOUNTER SYMPTOMS
DISTURBANCES IN COORDINATION: 1
DIZZINESS: 0
NUMBNESS: 0
ALTERED TEMPERATURE REGULATION: 0
SEIZURES: 0
POLYDIPSIA: 0
DECREASED APPETITE: 1
PARALYSIS: 0
TREMORS: 0
WEIGHT LOSS: 0
INCREASED ENERGY: 1
FATIGUE: 1
SPEECH CHANGE: 0
NIGHT SWEATS: 0
LOSS OF CONSCIOUSNESS: 0
WEAKNESS: 0
MEMORY LOSS: 0
POLYPHAGIA: 0
HEADACHES: 0
FEVER: 0
HALLUCINATIONS: 0
TINGLING: 0
CHILLS: 0
WEIGHT GAIN: 0

## 2019-11-18 ENCOUNTER — OFFICE VISIT (OUTPATIENT)
Dept: INTERNAL MEDICINE | Facility: CLINIC | Age: 29
End: 2019-11-18
Payer: COMMERCIAL

## 2019-11-18 VITALS
DIASTOLIC BLOOD PRESSURE: 73 MMHG | WEIGHT: 130.6 LBS | BODY MASS INDEX: 21.73 KG/M2 | OXYGEN SATURATION: 99 % | SYSTOLIC BLOOD PRESSURE: 116 MMHG | HEART RATE: 84 BPM

## 2019-11-18 DIAGNOSIS — E78.1 HYPERTRIGLYCERIDEMIA: Primary | ICD-10-CM

## 2019-11-18 ASSESSMENT — PAIN SCALES - GENERAL: PAINLEVEL: NO PAIN (0)

## 2019-11-18 NOTE — PROGRESS NOTES
HPI  29-year-old with history of Crohn's disease presents today for physical examination.  He has been doing well.  His bowel movements of tendency towards constipation at times.  He has had occasional use of MiraLAX to loosen him up.  His diet is relatively high in fruits and vegetables by his report this is associated with some occasional abdominal bloating and gas.  He has not had any pain discomfort blood in the stools or incontinence.  Is otherwise generally been feeling well doing limited physical activity either biking or skin once a week for about an hour.  We discussed increasing his physical activity in light of his elevated triglycerides.  He does not have a family history of diabetes however.  Past Medical History:   Diagnosis Date     Cancer (H) 05/05/2018    WILL NOT ALLOW US TO COMPLETE THE  QUESTTIONAIR WITHOUT MARKING YES TO THE DIAGNOSIS TO CANCER     Crohn's colitis (H)      Past Surgical History:   Procedure Laterality Date     COLONOSCOPY N/A 5/23/2018    Procedure: COMBINED COLONOSCOPY, SINGLE OR MULTIPLE BIOPSY/POLYPECTOMY BY BIOPSY;  Flex Sig;  Surgeon: Jim Patten MD;  Location: [facility] OR     NO HISTORY OF SURGERY       Family History   Problem Relation Age of Onset     Macular Degeneration Paternal Grandfather      Hypertension Paternal Grandfather      Hypertension Paternal Grandmother      Glaucoma No family hx of      Social History     Socioeconomic History     Marital status: Single     Spouse name: Not on file     Number of children: Not on file     Years of education: Not on file     Highest education level: Not on file   Occupational History     Occupation: data processing   Social Needs     Financial resource strain: Not on file     Food insecurity:     Worry: Not on file     Inability: Not on file     Transportation needs:     Medical: Not on file     Non-medical: Not on file   Tobacco Use     Smoking status: Never Smoker     Smokeless tobacco: Never Used   Substance and  Sexual Activity     Alcohol use: Yes     Alcohol/week: 1.0 - 2.0 standard drinks     Types: 1 - 2 Cans of beer per week     Comment: rarely     Drug use: No     Sexual activity: Never   Lifestyle     Physical activity:     Days per week: Not on file     Minutes per session: Not on file     Stress: Not on file   Relationships     Social connections:     Talks on phone: Not on file     Gets together: Not on file     Attends [employer] service: Not on file     Active member of club or organization: Not on file     Attends meetings of clubs or organizations: Not on file     Relationship status: Not on file     Intimate partner violence:     Fear of current or ex partner: Not on file     Emotionally abused: Not on file     Physically abused: Not on file     Forced sexual activity: Not on file   Other Topics Concern     Parent/sibling w/ CABG, MI or angioplasty before 65F 55M? Not Asked   Social History Narrative     Not on file       Exam:  /73 (BP Location: Right arm, Patient Position: Sitting, Cuff Size: Adult Regular)   Pulse 84   Wt 59.2 kg (130 lb 9.6 oz)   SpO2 99%   BMI 21.73 kg/m    130 lbs 9.6 oz  Physical Exam   The patient is alert, oriented with a clear sensorium.   Skin shows no lesions or rashes and good turgor.   Head is normocephalic and atraumatic.   Eyes show PERRLA with benign optic fundi.   Ears show normal TMs bilaterally.   Mouth shows clear oral mucosa.   Neck shows no nodes, thyromegaly or bruits.   Back is non tender.  Lungs are clear to percussion and auscultation.   Heart shows normal S1 and S2 without murmur or gallop.   Abdomen is soft and nontender without masses or organomegaly.   Genitalia show normal testes. No evidence of inguinal hernia.  Extremities show no edema and no evidence of active synovitis.   Neurologic examination shows cranial nerves II-XII intact. Motor shows 5/5 strength. Reflexes are symmetric. Cerebellar testing shows normal tandem gait.  Romberg  negative.    ASSESSMENT  1 Crohn's in remission on Remicade  2 hypertriglyceridemia needs reassessment    Plan  We will get fasting lipids at time of his next blood draw.  I am to have him increase his physical activity and exercise continue with the Remicade follow-up for reassessment in a year.  We will update his immunizations with a tetanus    This note was completed using Dragon voice recognition software.  Although reviewed after completion, some word and grammatical errors may occur.    Ariel Medrano MD  General Internal Medicine  [facility]  [phone]

## 2019-11-18 NOTE — NURSING NOTE
Chief Complaint   Patient presents with     Physical     Pt here for annual physical      RAMON Parada at 3:52 PM sign on 11/18/2019

## 2019-11-26 ENCOUNTER — HOME INFUSION (PRE-WILLOW HOME INFUSION) (OUTPATIENT)
Dept: PHARMACY | Facility: CLINIC | Age: 29
End: 2019-11-26

## 2019-11-27 NOTE — PROGRESS NOTES
This is a recent snapshot of the patient's [facility] medical record.  For current drug dose and complete information and questions, call [phone] or In Basket pool, fv home infusion ([id number])  CSN Number:  [id number]

## 2019-12-02 ENCOUNTER — HOME INFUSION (PRE-WILLOW HOME INFUSION) (OUTPATIENT)
Dept: PHARMACY | Facility: CLINIC | Age: 29
End: 2019-12-02

## 2019-12-02 ENCOUNTER — TELEPHONE (OUTPATIENT)
Dept: INTERNAL MEDICINE | Facility: CLINIC | Age: 29
End: 2019-12-02

## 2019-12-02 NOTE — TELEPHONE ENCOUNTER
[facility] Call Center    Phone Message    May a detailed message be left on voicemail: yes    Reason for Call: Pt informed Darlene that he had a Tetanus Booster Shot two weeks ago - needs okay to move forward with remicade infusion scheduled for tomorrow. Please call with verbal okay to move forward at [phone]. Okay to leave message.     Action Taken: Message routed to:  [facility] Primary Care Clinic

## 2019-12-02 NOTE — TELEPHONE ENCOUNTER
[facility] Call Center    Phone Message    May a detailed message be left on voicemail: yes    Reason for Call: Darlene calling back as she really needs an answer today about the Remicade. Please call her back today ASAP. Ok to leave a message.    Action Taken: Message routed to:  [facility] Primary Care      There is no contraindication to the remicade  JL

## 2019-12-03 ENCOUNTER — MEDICAL CORRESPONDENCE (OUTPATIENT)
Dept: HEALTH INFORMATION MANAGEMENT | Facility: CLINIC | Age: 29
End: 2019-12-03

## 2019-12-03 LAB
ALBUMIN SERPL-MCNC: 4.2 G/DL (ref 3.4–5)
ALP SERPL-CCNC: 61 U/L (ref 40–150)
ALT SERPL W P-5'-P-CCNC: 30 U/L (ref 0–70)
AST SERPL W P-5'-P-CCNC: 19 U/L (ref 0–45)
BASOPHILS # BLD AUTO: 0 10E9/L (ref 0–0.2)
BASOPHILS NFR BLD AUTO: 0.2 %
BILIRUB DIRECT SERPL-MCNC: 0.3 MG/DL (ref 0–0.2)
BILIRUB SERPL-MCNC: 1.8 MG/DL (ref 0.2–1.3)
CHOLEST SERPL-MCNC: 172 MG/DL
DIFFERENTIAL METHOD BLD: NORMAL
EOSINOPHIL # BLD AUTO: 0.1 10E9/L (ref 0–0.7)
EOSINOPHIL NFR BLD AUTO: 1.3 %
ERYTHROCYTE [DISTWIDTH] IN BLOOD BY AUTOMATED COUNT: 11.8 % (ref 10–15)
HCT VFR BLD AUTO: 45.6 % (ref 40–53)
HDLC SERPL-MCNC: 45 MG/DL
HGB BLD-MCNC: 16.3 G/DL (ref 13.3–17.7)
IMM GRANULOCYTES # BLD: 0 10E9/L (ref 0–0.4)
IMM GRANULOCYTES NFR BLD: 0.2 %
LDLC SERPL CALC-MCNC: 111 MG/DL
LYMPHOCYTES # BLD AUTO: 2.6 10E9/L (ref 0.8–5.3)
LYMPHOCYTES NFR BLD AUTO: 48.2 %
MCH RBC QN AUTO: 32.7 PG (ref 26.5–33)
MCHC RBC AUTO-ENTMCNC: 35.7 G/DL (ref 31.5–36.5)
MCV RBC AUTO: 92 FL (ref 78–100)
MONOCYTES # BLD AUTO: 0.4 10E9/L (ref 0–1.3)
MONOCYTES NFR BLD AUTO: 7.8 %
NEUTROPHILS # BLD AUTO: 2.3 10E9/L (ref 1.6–8.3)
NEUTROPHILS NFR BLD AUTO: 42.3 %
NONHDLC SERPL-MCNC: 127 MG/DL
NRBC # BLD AUTO: 0 10*3/UL
NRBC BLD AUTO-RTO: 0 /100
PLATELET # BLD AUTO: 274 10E9/L (ref 150–450)
PROT SERPL-MCNC: 8.3 G/DL (ref 6.8–8.8)
RBC # BLD AUTO: 4.98 10E12/L (ref 4.4–5.9)
TRIGL SERPL-MCNC: 78 MG/DL
WBC # BLD AUTO: 5.4 10E9/L (ref 4–11)

## 2019-12-03 PROCEDURE — 85025 COMPLETE CBC W/AUTO DIFF WBC: CPT | Performed by: INTERNAL MEDICINE

## 2019-12-03 PROCEDURE — 80076 HEPATIC FUNCTION PANEL: CPT | Performed by: INTERNAL MEDICINE

## 2019-12-03 PROCEDURE — 80061 LIPID PANEL: CPT | Performed by: INTERNAL MEDICINE

## 2019-12-03 NOTE — PROGRESS NOTES
This is a recent snapshot of the patient's [facility] medical record.  For current drug dose and complete information and questions, call [phone] or In Basket pool, fv home infusion ([id number])  CSN Number:  [id number]

## 2019-12-03 NOTE — TELEPHONE ENCOUNTER
Left a message for [facility] to relay providers message. Sulma Lewis LPN 12/3/2019 8:12 AM

## 2019-12-04 ENCOUNTER — MYC MEDICAL ADVICE (OUTPATIENT)
Dept: INTERNAL MEDICINE | Facility: CLINIC | Age: 29
End: 2019-12-04

## 2019-12-04 ENCOUNTER — HOME INFUSION (PRE-WILLOW HOME INFUSION) (OUTPATIENT)
Dept: PHARMACY | Facility: CLINIC | Age: 29
End: 2019-12-04

## 2019-12-05 NOTE — PROGRESS NOTES
This is a recent snapshot of the patient's [facility] medical record.  For current drug dose and complete information and questions, call [phone] or In Basket pool, fv home infusion ([id number])  CSN Number:  [id number]

## 2019-12-17 ENCOUNTER — MEDICAL CORRESPONDENCE (OUTPATIENT)
Dept: HEALTH INFORMATION MANAGEMENT | Facility: CLINIC | Age: 29
End: 2019-12-17

## 2019-12-26 ENCOUNTER — HOME INFUSION (PRE-WILLOW HOME INFUSION) (OUTPATIENT)
Dept: PHARMACY | Facility: CLINIC | Age: 29
End: 2019-12-26

## 2019-12-26 ENCOUNTER — TELEPHONE (OUTPATIENT)
Dept: INTERNAL MEDICINE | Facility: CLINIC | Age: 29
End: 2019-12-26

## 2019-12-26 NOTE — TELEPHONE ENCOUNTER
Health Call Center    Phone Message    May a detailed message be left on voicemail: yes    Reason for Call: Other: FYI that pt is infusing a week and a half late. If any questions please call [phone].     Action Taken: Message routed to:  [facility] JAIMIE

## 2019-12-30 NOTE — PROGRESS NOTES
This is a recent snapshot of the patient's [facility] medical record.  For current drug dose and complete information and questions, call [phone] or In Basket pool, fv home infusion ([id number])  CSN Number:  [id number]

## 2020-01-09 ENCOUNTER — HOME INFUSION (PRE-WILLOW HOME INFUSION) (OUTPATIENT)
Dept: PHARMACY | Facility: CLINIC | Age: 30
End: 2020-01-09

## 2020-01-10 ENCOUNTER — MYC MEDICAL ADVICE (OUTPATIENT)
Dept: INTERNAL MEDICINE | Facility: CLINIC | Age: 30
End: 2020-01-10

## 2020-01-10 ENCOUNTER — MEDICAL CORRESPONDENCE (OUTPATIENT)
Dept: HEALTH INFORMATION MANAGEMENT | Facility: CLINIC | Age: 30
End: 2020-01-10

## 2020-01-10 DIAGNOSIS — R04.0 BLEEDING NOSE: Primary | ICD-10-CM

## 2020-01-10 LAB
ALBUMIN SERPL-MCNC: 4.1 G/DL (ref 3.4–5)
ALP SERPL-CCNC: 67 U/L (ref 40–150)
ALT SERPL W P-5'-P-CCNC: 37 U/L (ref 0–70)
AST SERPL W P-5'-P-CCNC: 38 U/L (ref 0–45)
BASOPHILS # BLD AUTO: 0 10E9/L (ref 0–0.2)
BASOPHILS NFR BLD AUTO: 0.3 %
BILIRUB DIRECT SERPL-MCNC: 0.2 MG/DL (ref 0–0.2)
BILIRUB SERPL-MCNC: 1.4 MG/DL (ref 0.2–1.3)
DIFFERENTIAL METHOD BLD: ABNORMAL
EOSINOPHIL # BLD AUTO: 0.1 10E9/L (ref 0–0.7)
EOSINOPHIL NFR BLD AUTO: 2.1 %
ERYTHROCYTE [DISTWIDTH] IN BLOOD BY AUTOMATED COUNT: 12 % (ref 10–15)
HCT VFR BLD AUTO: 47.2 % (ref 40–53)
HGB BLD-MCNC: 17.2 G/DL (ref 13.3–17.7)
IMM GRANULOCYTES # BLD: 0 10E9/L (ref 0–0.4)
IMM GRANULOCYTES NFR BLD: 0.3 %
LYMPHOCYTES # BLD AUTO: 3.1 10E9/L (ref 0.8–5.3)
LYMPHOCYTES NFR BLD AUTO: 49 %
MCH RBC QN AUTO: 33.7 PG (ref 26.5–33)
MCHC RBC AUTO-ENTMCNC: 36.4 G/DL (ref 31.5–36.5)
MCV RBC AUTO: 92 FL (ref 78–100)
MONOCYTES # BLD AUTO: 0.5 10E9/L (ref 0–1.3)
MONOCYTES NFR BLD AUTO: 7.2 %
NEUTROPHILS # BLD AUTO: 2.6 10E9/L (ref 1.6–8.3)
NEUTROPHILS NFR BLD AUTO: 41.1 %
NRBC # BLD AUTO: 0 10*3/UL
NRBC BLD AUTO-RTO: 0 /100
PLATELET # BLD AUTO: 268 10E9/L (ref 150–450)
PROT SERPL-MCNC: 8.3 G/DL (ref 6.8–8.8)
RBC # BLD AUTO: 5.11 10E12/L (ref 4.4–5.9)
WBC # BLD AUTO: 6.3 10E9/L (ref 4–11)

## 2020-01-10 PROCEDURE — 85025 COMPLETE CBC W/AUTO DIFF WBC: CPT | Performed by: INTERNAL MEDICINE

## 2020-01-10 PROCEDURE — 80076 HEPATIC FUNCTION PANEL: CPT | Performed by: INTERNAL MEDICINE

## 2020-01-10 NOTE — PROGRESS NOTES
This is a recent snapshot of the patient's [facility] medical record.  For current drug dose and complete information and questions, call [phone] or In Basket pool, fv home infusion ([id number])  CSN Number:  [id number]

## 2020-01-13 ENCOUNTER — MYC MEDICAL ADVICE (OUTPATIENT)
Dept: INTERNAL MEDICINE | Facility: CLINIC | Age: 30
End: 2020-01-13

## 2020-01-27 ENCOUNTER — OFFICE VISIT (OUTPATIENT)
Dept: OTOLARYNGOLOGY | Facility: CLINIC | Age: 30
End: 2020-01-27
Attending: INTERNAL MEDICINE
Payer: COMMERCIAL

## 2020-01-27 VITALS — WEIGHT: 133 LBS | BODY MASS INDEX: 20.88 KG/M2 | HEIGHT: 67 IN

## 2020-01-27 DIAGNOSIS — R04.0 EPISTAXIS: Primary | ICD-10-CM

## 2020-01-27 DIAGNOSIS — J34.89 NASAL OBSTRUCTION: ICD-10-CM

## 2020-01-27 ASSESSMENT — PAIN SCALES - GENERAL: PAINLEVEL: NO PAIN (0)

## 2020-01-27 ASSESSMENT — MIFFLIN-ST. JEOR: SCORE: 1531.91

## 2020-01-27 NOTE — PROGRESS NOTES
HISTORY OF PRESENT ILLNESS:  Mr. Garcia is a 29-year-old gentleman who comes in to see us on.  He has had issues with recurrent left-sided epistaxis for some time.  He notes that he also has over the last week had some discomfort on the lingual aspect of his left mandible inferior to the gingiva.  This has been going on since last Wednesday and is of moderate irritation.  The patient denies any problems with excessive bleeding.  He otherwise has normal nasal function.  He does not use any nasal sprays.  He is otherwise feeling well.         PAST MEDICAL HISTORY, PAST SURGICAL HISTORY, FAMILY HISTORY, SOCIAL HISTORY:  Noted and reviewed in the chart.  It should be noted the patient does have Crohn's disease.      PHYSICAL EXAMINATION:  This is a 29-year-old gentleman in no acute distress.  Normal mood, normal affect, normal ability to communicate.  Alert and appropriate.  Head is normocephalic.  Cranial nerve VII is House-Brackmann I out of VI bilaterally.  Breathing without any difficulty or stridor.  Eyes are anicteric.  Skin of the head and neck appears normal.  Examination of the nose shows some mild irritation of the left caudal septum with a relatively straight septum.  No masses or abnormalities noted.  Examination of the ears shows normal external auditory canals and tympanic membranes.  Palpation of neck shows no masses, tenderness or lymphadenopathy.  Examination of the mouth shows normal tongue, normal oropharynx.  Floor of mouth is visualized.  There are a couple of small whitish discolorations on the mucosa but very minimal and benign in appearance.  The patient identifies the area of discomfort as along the lingual aspect of the mandible approximately at the level of the first molar.  There is no palpable soft tissue abnormality.  No clear dental or odontogenic source.      ASSESSMENT AND PLAN:  A 29-year-old with epistaxis.  Today, we discussed options of cauterization versus aggressive hydration.  He  opted to use aggressive hydration and will let us know if that does not take care of the problem.  Secondly, he has some left evie-mandibular discomfort.  We discussed empiric treatment with antibiotics versus dental evaluation.  He will pursue a dental evaluation.  He will call us if either of these symptoms persist in the future.

## 2020-01-27 NOTE — NURSING NOTE
"Chief Complaint   Patient presents with     Consult     Epistaxis     Height 1.702 m (5' 7\"), weight 60.3 kg (133 lb).    Tammie Rodriguez, EMT    "

## 2020-01-27 NOTE — LETTER
1/27/2020       RE: Emil Garcia  [address] [address]     Dear Colleague,    Thank you for referring your patient, Emil Garcia, to the [facility] at [facility]. Please see a copy of my visit note below.    HISTORY OF PRESENT ILLNESS:  Mr. Garcia is a 29-year-old gentleman who comes in to see us on.  He has had issues with recurrent left-sided epistaxis for some time.  He notes that he also has over the last week had some discomfort on the lingual aspect of his left mandible inferior to the gingiva.  This has been going on since last Wednesday and is of moderate irritation.  The patient denies any problems with excessive bleeding.  He otherwise has normal nasal function.  He does not use any nasal sprays.  He is otherwise feeling well.         PAST MEDICAL HISTORY, PAST SURGICAL HISTORY, FAMILY HISTORY, SOCIAL HISTORY:  Noted and reviewed in the chart.  It should be noted the patient does have Crohn's disease.      PHYSICAL EXAMINATION:  This is a 29-year-old gentleman in no acute distress.  Normal mood, normal affect, normal ability to communicate.  Alert and appropriate.  Head is normocephalic.  Cranial nerve VII is House-Brackmann I out of VI bilaterally.  Breathing without any difficulty or stridor.  Eyes are anicteric.  Skin of the head and neck appears normal.  Examination of the nose shows some mild irritation of the left caudal septum with a relatively straight septum.  No masses or abnormalities noted.  Examination of the ears shows normal external auditory canals and tympanic membranes.  Palpation of neck shows no masses, tenderness or lymphadenopathy.  Examination of the mouth shows normal tongue, normal oropharynx.  Floor of mouth is visualized.  There are a couple of small whitish discolorations on the mucosa but very minimal and benign in appearance.  The patient identifies the area of discomfort as along the lingual aspect  of the mandible approximately at the level of the first molar.  There is no palpable soft tissue abnormality.  No clear dental or odontogenic source.      ASSESSMENT AND PLAN:  A 29-year-old with epistaxis.  Today, we discussed options of cauterization versus aggressive hydration.  He opted to use aggressive hydration and will let us know if that does not take care of the problem.  Secondly, he has some left evie-mandibular discomfort.  We discussed empiric treatment with antibiotics versus dental evaluation.  He will pursue a dental evaluation.  He will call us if either of these symptoms persist in the future.         Again, thank you for allowing me to participate in the care of your patient.      Sincerely,    Anthony Bolaños MD

## 2020-02-03 ENCOUNTER — HOME INFUSION (PRE-WILLOW HOME INFUSION) (OUTPATIENT)
Dept: PHARMACY | Facility: CLINIC | Age: 30
End: 2020-02-03

## 2020-02-04 NOTE — PROGRESS NOTES
This is a recent snapshot of the patient's [facility] medical record.  For current drug dose and complete information and questions, call [phone] or In Basket pool, fv home infusion ([id number])  CSN Number:  [id number]

## 2020-02-06 ENCOUNTER — HOME INFUSION (PRE-WILLOW HOME INFUSION) (OUTPATIENT)
Dept: PHARMACY | Facility: CLINIC | Age: 30
End: 2020-02-06

## 2020-02-07 ENCOUNTER — HOME INFUSION (PRE-WILLOW HOME INFUSION) (OUTPATIENT)
Dept: PHARMACY | Facility: CLINIC | Age: 30
End: 2020-02-07

## 2020-02-07 ENCOUNTER — OFFICE VISIT (OUTPATIENT)
Dept: INTERNAL MEDICINE | Facility: CLINIC | Age: 30
End: 2020-02-07
Payer: COMMERCIAL

## 2020-02-07 VITALS
RESPIRATION RATE: 18 BRPM | BODY MASS INDEX: 20.19 KG/M2 | SYSTOLIC BLOOD PRESSURE: 99 MMHG | HEART RATE: 94 BPM | DIASTOLIC BLOOD PRESSURE: 68 MMHG | WEIGHT: 128.9 LBS | TEMPERATURE: 98.3 F

## 2020-02-07 DIAGNOSIS — R19.5 LOOSE STOOLS: Primary | ICD-10-CM

## 2020-02-07 DIAGNOSIS — R19.5 LOOSE STOOLS: ICD-10-CM

## 2020-02-07 DIAGNOSIS — R11.0 NAUSEA: ICD-10-CM

## 2020-02-07 LAB
ANION GAP SERPL CALCULATED.3IONS-SCNC: 10 MMOL/L (ref 3–14)
BASOPHILS # BLD AUTO: 0 10E9/L (ref 0–0.2)
BASOPHILS NFR BLD AUTO: 0.1 %
BUN SERPL-MCNC: 19 MG/DL (ref 7–30)
C COLI+JEJUNI+LARI FUSA STL QL NAA+PROBE: NOT DETECTED
C DIFF TOX B STL QL: NEGATIVE
CALCIUM SERPL-MCNC: 9 MG/DL (ref 8.5–10.1)
CHLORIDE SERPL-SCNC: 109 MMOL/L (ref 94–109)
CO2 SERPL-SCNC: 19 MMOL/L (ref 20–32)
CREAT SERPL-MCNC: 1.06 MG/DL (ref 0.66–1.25)
DIFFERENTIAL METHOD BLD: NORMAL
EC STX1 GENE STL QL NAA+PROBE: NOT DETECTED
EC STX2 GENE STL QL NAA+PROBE: NOT DETECTED
ENTERIC PATHOGEN COMMENT: ABNORMAL
EOSINOPHIL # BLD AUTO: 0 10E9/L (ref 0–0.7)
EOSINOPHIL NFR BLD AUTO: 0.4 %
ERYTHROCYTE [DISTWIDTH] IN BLOOD BY AUTOMATED COUNT: 12.1 % (ref 10–15)
GFR SERPL CREATININE-BSD FRML MDRD: >90 ML/MIN/{1.73_M2}
GLUCOSE SERPL-MCNC: 82 MG/DL (ref 70–99)
HCT VFR BLD AUTO: 46.3 % (ref 40–53)
HGB BLD-MCNC: 16 G/DL (ref 13.3–17.7)
IMM GRANULOCYTES # BLD: 0 10E9/L (ref 0–0.4)
IMM GRANULOCYTES NFR BLD: 0.3 %
LYMPHOCYTES # BLD AUTO: 0.8 10E9/L (ref 0.8–5.3)
LYMPHOCYTES NFR BLD AUTO: 11.8 %
MCH RBC QN AUTO: 32.6 PG (ref 26.5–33)
MCHC RBC AUTO-ENTMCNC: 34.6 G/DL (ref 31.5–36.5)
MCV RBC AUTO: 94 FL (ref 78–100)
MONOCYTES # BLD AUTO: 0.5 10E9/L (ref 0–1.3)
MONOCYTES NFR BLD AUTO: 7.5 %
NEUTROPHILS # BLD AUTO: 5.5 10E9/L (ref 1.6–8.3)
NEUTROPHILS NFR BLD AUTO: 79.9 %
NOROV GI+II ORF1-ORF2 JNC STL QL NAA+PR: ABNORMAL
NRBC # BLD AUTO: 0 10*3/UL
NRBC BLD AUTO-RTO: 0 /100
PLATELET # BLD AUTO: 225 10E9/L (ref 150–450)
POTASSIUM SERPL-SCNC: 3.3 MMOL/L (ref 3.4–5.3)
RBC # BLD AUTO: 4.91 10E12/L (ref 4.4–5.9)
RVA NSP5 STL QL NAA+PROBE: NOT DETECTED
SALMONELLA SP RPOD STL QL NAA+PROBE: NOT DETECTED
SHIGELLA SP+EIEC IPAH STL QL NAA+PROBE: NOT DETECTED
SODIUM SERPL-SCNC: 138 MMOL/L (ref 133–144)
SPECIMEN SOURCE: NORMAL
V CHOL+PARA RFBL+TRKH+TNAA STL QL NAA+PR: NOT DETECTED
WBC # BLD AUTO: 6.9 10E9/L (ref 4–11)
Y ENTERO RECN STL QL NAA+PROBE: NOT DETECTED

## 2020-02-07 RX ORDER — ONDANSETRON 4 MG/1
4 TABLET, FILM COATED ORAL EVERY 6 HOURS PRN
Qty: 30 TABLET | Refills: 1 | Status: SHIPPED | OUTPATIENT
Start: 2020-02-07 | End: 2022-07-21

## 2020-02-07 ASSESSMENT — PAIN SCALES - GENERAL: PAINLEVEL: MODERATE PAIN (4)

## 2020-02-07 NOTE — PROGRESS NOTES
This is a recent snapshot of the patient's [facility] medical record.  For current drug dose and complete information and questions, call [phone] or In Basket pool, fv home infusion ([id number])  CSN Number:  [id number]

## 2020-02-07 NOTE — PATIENT INSTRUCTIONS
Patient Education     Diarrhea with Uncertain Cause (Adult)    Diarrhea is when stools are loose and watery. This can be caused by:    Viral infections    Bacterial infections    Food poisoning    Parasites    Irritable bowel syndrome (IBS)    Inflammatory bowel diseases such as ulcerative colitis, Crohn's disease, and celiac disease    Food intolerance, such as to lactose, the sugar found in milk and milk products    Reaction to medicines like antibiotics, laxatives, cancer drugs, and antacids  Along with diarrhea, you may also have:    Abdominal pain and cramping    Nausea and vomiting    Loss of bowel control    Fever and chills    Bloody stools  In some cases, antibiotics may help to treat diarrhea. You may have a stool sample test. This is done to see what is causing your diarrhea, and if antibiotics will help treat it. The results of a stool sample test may take up to 2 days. The healthcare provider may not give you antibiotics until he or she has the stool test results.  Diarrhea can cause dehydration. This is the loss of too much water and other fluids from the body. When this occurs, body fluid must be replaced. This can be done with oral rehydration solutions. Oral rehydration solutions are available at drugstores and grocery stores without a prescription. Sports drinks are not the best choice if you are very dehydrated. They have too much sugar and not enough electrolytes.  Home care  Follow all instructions given by your healthcare provider. Rest at home for the next 24 hours, or until you feel better. Avoid caffeine, tobacco, and alcohol. These can make diarrhea, cramping, and pain worse.  If taking medicines:    Over-the-counter nausea and diarrhea medicines are generally OK unless you experience fever or blood stool. Check with your doctor first in those circumstances.    You may use acetaminophen or NSAID medicines like ibuprofen or naproxen to reduce pain and fever. Don t use these if you have  chronic liver or kidney disease, or ever had a stomach ulcer or gastrointestinal bleeding. Don't use NSAID medicines if you are already taking one for another condition (like arthritis) or are on daily aspirin therapy (such as for heart disease or after a stroke). Talk with your healthcare provider first.    If antibiotics were prescribed, be sure you take them until they are finished. Don t stop taking them even when you feel better. Antibiotics must be taken as a full course.  To prevent the spread of illness:    Remember that washing with soap and water and using alcohol-based  is the best way to prevent the spread of infection. Dry your hands with a single use towel (like a paper towel).    Clean the toilet after each use.    Wash your hands before eating.    Wash your hands before and after preparing food. Keep in mind that people with diarrhea or vomiting should not prepare food for others.    Wash your hands after using cutting boards, countertops, and knives that have been in contact with raw foods.    Wash and then peel fruits and vegetables.    Keep uncooked meats away from cooked and ready-to-eat foods.    Use a food thermometer when cooking. Cook poultry to at least 165 F (74 C). Cook ground meat (beef, veal, pork, lamb) to at least 160 F (71 C). Cook fresh beef, veal, lamb, and pork to at least 145 F (63 C).    Don t eat raw or undercooked eggs (poached or marito side up), poultry, meat, or unpasteurized milk and juices.  Food and drinks  The main goal while treating vomiting or diarrhea is to prevent dehydration. This is done by taking small amounts of liquids often.    Keep in mind that liquids are more important than food right now.    Drink only small amounts of liquids at a time.    Don t force yourself to eat, especially if you are having cramping, vomiting, or diarrhea. Don t eat large amounts at a time, even if you are hungry.    If you eat, avoid fatty, greasy, spicy, or fried  foods.    Don t eat dairy foods or drink milk if you have diarrhea. These can make diarrhea worse.  During the first 24 hours you can try:    Oral rehydration solutions.  Sports drinks may be used if you are not too dehydrated and are otherwise healthy.    Soft drinks without caffeine    Ginger ale    Water (plain or flavored)    Decaf tea or coffee    Clear broth, consommé, or bouillon    Gelatin, popsicles, or frozen fruit juice bars  The second 24 hours, if you are feeling better, you can add:    Hot cereal, plain toast, bread, rolls, or crackers    Plain noodles, rice, mashed potatoes, chicken noodle soup, or rice soup    Unsweetened canned fruit (no pineapple)    Bananas  As you recover:    Limit fat intake to less than 15 grams per day. Don t eat margarine, butter, oils, mayonnaise, sauces, gravies, fried foods, peanut butter, meat, poultry, or fish.    Limit fiber. Don t eat raw or cooked vegetables, fresh fruits except bananas, or bran cereals.    Limit caffeine and chocolate.    Limit dairy.    Don t use spices or seasonings except salt.    Go back to your normal diet over time, as you feel better and your symptoms improve.    If the symptoms come back, go back to a simple diet or clear liquids.  Follow-up care  Follow up with your healthcare provider, or as advised. If a stool sample was taken or cultures were done, call the healthcare provider for the results as instructed.  Call 911  Call 911 if you have any of these symptoms:    Trouble breathing    Confusion    Extreme drowsiness or trouble walking    Loss of consciousness    Rapid heart rate    Chest pain    Stiff neck    Seizure  When to seek medical advice  Call your healthcare provider right away if any of these occur:    Abdominal pain that gets worse    Constant lower right abdominal pain    Continued vomiting and inability to keep liquids down    Diarrhea more than 5 times a day    Blood in vomit or stool    Dark urine or no urine for 8 hours,  dry mouth and tongue, tiredness, weakness, or dizziness    Drowsiness    New rash    You don t get better in 2 to 3 days    Fever of 100.4 F (38 C) or higher, or as directed by your healthcare provider  Date Last Reviewed: 6/1/2018 2000-2019 [employer]. [address] [place] [address]. All rights reserved. This information is not intended as a substitute for professional medical care. Always follow your healthcare professional's instructions.

## 2020-02-07 NOTE — NURSING NOTE
Chief Complaint   Patient presents with     Diarrhea     Abdominal Pain       Ubaldo Garcia CMA (AAMA) at 10:48 AM on 2/7/2020

## 2020-02-07 NOTE — PROGRESS NOTES
[facility]  Primary Care Center   Desire Zamudio, AUSTYN CNP  02/07/2020      Chief Complaint:   No chief complaint on file.       History of Present Illness:   Emil Garcia is a 29 year old male with a history of Crohn's disease who presents *** for {eval/fu:474095} of ***.    Other concerns discussed:  ***     Review of Systems:   Pertinent items are noted in HPI or as in patient entered ROS below, remainder of complete ROS is negative.     Active Medications:     Current Outpatient Medications:      Cholecalciferol (VITAMIN D PO), Take by mouth daily, Disp: , Rfl:      DiphenhydrAMINE HCl (BENADRYL PO), Take 50 mg by mouth as needed , Disp: , Rfl:      inFLIXimab (REMICADE) 100 MG injection, 100 mg IV every 4 weeks, Disp: , Rfl:      mesalamine (LIALDA) 1.2 g EC tablet, Take 4 tablets (4,800 mg) by mouth daily (Patient not taking: Reported on 4/30/2019), Disp: 120 tablet, Rfl: 3     Multiple Vitamins-Minerals (MULTIVITAL PO), Take  by mouth., Disp: , Rfl:       Allergies:   Lactose  Nsaids  Seasonal allergies      Past Medical History:  Cancer   Crohn's disease     Past Surgical History:  History reviewed. No pertinent past surgical history.      Family History:   Macular degeneration - paternal grandfather   Hypertension - paternal grandfather, paternal grandmother       Social History:   The patient is single, a never smoker, and does consume alcohol (1-2 cans of beer per week).        Physical Exam:   There were no vitals taken for this visit.   Constitutional: Alert, oriented, pleasant, no acute distress  Head: Normocephalic, atraumatic  Eyes: Extra-ocular movements intact, pupils equally round and reactive bilaterally, no scleral icterus  Ears: tympanic membranes pearly gray with positive light reflex  ENT: Oropharynx clear, moist mucus membranes, good dentition  Neck: Supple, no lymphadenopathy, no thyromegaly  Cardiovascular: Regular rate and rhythm, no murmurs, rubs or gallops, peripheral pulses  full/symmetric  Respiratory: Good air movement bilaterally, lungs clear, no wheezes/rales/rhonchi  GI: Abdomen soft, bowel sounds present, nondistended, nontender, no organomegaly or masses, no rebound/guarding  Musculoskeletal: No edema, normal muscle tone, normal gait  Neurologic: Alert and oriented, cranial nerves 2-12 intact, strength 5/5 throughout, sensation to light touch intact, reflexes 2+ bilaterally  Skin: No rashes/lesions  Psychiatric: normal mentation, affect and mood       Assessment and Plan:  There are no diagnoses linked to this encounter.     Follow-up: No follow-ups on file.       ***  Scribe Disclosure:  Ale ESPOSITO, am serving as a scribe to document services personally performed by AUSTYN Santana CNP at this visit, based upon the provider's statements to me. All documentation has been reviewed by the aforementioned provider prior to being entered into the official medical record.    Scribe Preparation Attestation:  Ale ESPOSITO, a scribe, prepared the chart for today's encounter. ***     Portions of this medical record were completed by a scribe. UPON MY REVIEW AND AUTHENTICATION BY ELECTRONIC SIGNATURE, this confirms (a) I performed the applicable clinical services, and (b) the record is accurate.

## 2020-02-07 NOTE — PROGRESS NOTES
[facility]  Primary Care Center   Desire Zamudio, AUSTYN CNP  02/07/2020      Chief Complaint:   Diarrhea and Abdominal Pain       History of Present Illness:   Emil Garcia is a 29 year old male with a history of Crohn's disease who presents with his father for evaluation of diarrhea and abdominal pain. He began having watery diarrhea and intermittent abdominal pain this morning. He has had 8-9 episodes of diarrhea today. He recently gathered a stool sample and noticed some blood in it but is unsure if the blood is because of the diarrhea (I am unable to see any alexa blood in this stool--it is tan). He ate some barbeque last night which he thinks may be the reason for his diarrhea. He has not eaten anything today and hasn't been drinking a lot. Has mild nausea, denies any vomiting. He endorses chills last night. He denies a fever. He has not been on any antibiotics recently. He has not recently traveled outside of the [place]. Of note, he is currently on Remicade which has been working well to manage his Crohn's disease. He follows with [facility].  He does endorse a history of C diff.     Review of Systems:   Pertinent items are noted in HPI or as in patient entered ROS below, remainder of complete ROS is negative.     Active Medications:      Cholecalciferol (VITAMIN D PO), Take by mouth daily, Disp: , Rfl:      DiphenhydrAMINE HCl (BENADRYL PO), Take 50 mg by mouth as needed , Disp: , Rfl:      inFLIXimab (REMICADE) 100 MG injection, 100 mg IV every 4 weeks, Disp: , Rfl:      Multiple Vitamins-Minerals (MULTIVITAL PO), Take  by mouth., Disp: , Rfl:      mesalamine (LIALDA) 1.2 g EC tablet, Take 4 tablets (4,800 mg) by mouth daily (Patient not taking: Reported on 4/30/2019), Disp: 120 tablet, Rfl: 3      Allergies:   Lactose  Nsaids  Seasonal allergies      Past Medical History:  Cancer   Crohn's disease     Past Surgical History:  History reviewed. No pertinent past surgical history.      Family History:   Macular  degeneration - paternal grandfather   Hypertension - paternal grandfather, paternal grandmother       Social History:   The patient is single, a never smoker, and does consume alcohol (1-2 cans of beer per week).        Physical Exam:   BP 99/68   Pulse 94   Temp 98.3  F (36.8  C) (Oral)   Resp 18   Wt 58.5 kg (128 lb 14.4 oz)   BMI 20.19 kg/m     Constitutional: Alert, oriented, pleasant, no acute distress  Head: Normocephalic, atraumatic  Eyes: Extra-ocular movements intact, pupils equally round and reactive bilaterally, no scleral icterus  ENT: Oropharynx clear, moist mucus membranes, good dentition  Neck: Supple, no lymphadenopathy, no thyromegaly  Cardiovascular: Regular rate and rhythm, no murmurs, rubs or gallops  Respiratory: Good air movement bilaterally, lungs clear, no wheezes/rales/rhonchi  GI: Abdomen soft, bowel sounds present, nondistended, nontender, no organomegaly or masses, no rebound/guarding  Psychiatric: normal mentation, anxious affect and mood       Assessment and Plan:  1. Loose stools  This morning, he began having frequent episodes of diarrhea and mild abdominal pain. He brought a stool sample with him to clinic today. I discussed that this is most likely a self-limited gastroenteritis. He is very anxious about this--lab tests ordered as below for further evaluation. If symptoms worsen or persist into next week, would consider a fecal calprotectin to ensure no Crohn's flare. Recommended supportive treatment, push clear fluids and gradually advance diet as tolerated. Can use zofran for nausea prn. If symptoms worsen over the weekend or if unable to tolerate PO intake, I advised him to go to the ER.    - Basic metabolic panel  - Clostridium difficile toxin B PCR  - Enteric Bacteria and Virus Panel by [facility] Stool  - CBC with platelets differential    Follow-up: Return to clinic if symptoms worsen or fail to improve.        Scribe Disclosure:  I, Ale Lipscomb, am serving as a scribe to  document services personally performed by AUSTYN Santana CNP at this visit, based upon the provider's statements to me. All documentation has been reviewed by the aforementioned provider prior to being entered into the official medical record.     Portions of this medical record were completed by a scribe. UPON MY REVIEW AND AUTHENTICATION BY ELECTRONIC SIGNATURE, this confirms (a) I performed the applicable clinical services, and (b) the record is accurate.     AUSTYN Santana CNP

## 2020-02-10 ENCOUNTER — HOME INFUSION (PRE-WILLOW HOME INFUSION) (OUTPATIENT)
Dept: PHARMACY | Facility: CLINIC | Age: 30
End: 2020-02-10

## 2020-02-10 NOTE — PROGRESS NOTES
This is a recent snapshot of the patient's [facility] medical record.  For current drug dose and complete information and questions, call [phone] or In [facility] pool, fv home infusion ([id number])  CSN Number:  [id number]

## 2020-02-11 ENCOUNTER — TELEPHONE (OUTPATIENT)
Dept: INTERNAL MEDICINE | Facility: CLINIC | Age: 30
End: 2020-02-11

## 2020-02-11 ENCOUNTER — MYC MEDICAL ADVICE (OUTPATIENT)
Dept: INTERNAL MEDICINE | Facility: CLINIC | Age: 30
End: 2020-02-11

## 2020-02-11 NOTE — TELEPHONE ENCOUNTER
I spoke with Mckinley today to review results as they have not yet been viewed on MyChart. We discussed positive Norovirus, reducing transmission by bleaching surfaces within his home, washing hands with soap and water, not preparing food for others until about 2 days after symptoms resolve. In the meantime, staying hydrated is recommended. Mckinley voiced understanding.    Mary George RN ([place])

## 2020-02-11 NOTE — PROGRESS NOTES
This is a recent snapshot of the patient's [facility] medical record.  For current drug dose and complete information and questions, call [phone] or In Basket pool, fv home infusion ([id number])  CSN Number:  [id number]

## 2020-02-12 ENCOUNTER — MYC MEDICAL ADVICE (OUTPATIENT)
Dept: INTERNAL MEDICINE | Facility: CLINIC | Age: 30
End: 2020-02-12

## 2020-02-13 ENCOUNTER — HOME INFUSION (PRE-WILLOW HOME INFUSION) (OUTPATIENT)
Dept: PHARMACY | Facility: CLINIC | Age: 30
End: 2020-02-13

## 2020-02-14 ENCOUNTER — MEDICAL CORRESPONDENCE (OUTPATIENT)
Dept: HEALTH INFORMATION MANAGEMENT | Facility: CLINIC | Age: 30
End: 2020-02-14

## 2020-02-14 LAB
ALBUMIN SERPL-MCNC: 4.1 G/DL (ref 3.4–5)
ALP SERPL-CCNC: 74 U/L (ref 40–150)
ALT SERPL W P-5'-P-CCNC: 37 U/L (ref 0–70)
AST SERPL W P-5'-P-CCNC: 20 U/L (ref 0–45)
BASOPHILS # BLD AUTO: 0 10E9/L (ref 0–0.2)
BASOPHILS NFR BLD AUTO: 0.2 %
BILIRUB DIRECT SERPL-MCNC: 0.1 MG/DL (ref 0–0.2)
BILIRUB SERPL-MCNC: 0.7 MG/DL (ref 0.2–1.3)
DIFFERENTIAL METHOD BLD: NORMAL
EOSINOPHIL # BLD AUTO: 0.1 10E9/L (ref 0–0.7)
EOSINOPHIL NFR BLD AUTO: 2.3 %
ERYTHROCYTE [DISTWIDTH] IN BLOOD BY AUTOMATED COUNT: 11.8 % (ref 10–15)
HCT VFR BLD AUTO: 43.8 % (ref 40–53)
HGB BLD-MCNC: 15.7 G/DL (ref 13.3–17.7)
IMM GRANULOCYTES # BLD: 0 10E9/L (ref 0–0.4)
IMM GRANULOCYTES NFR BLD: 0.2 %
LYMPHOCYTES # BLD AUTO: 3.2 10E9/L (ref 0.8–5.3)
LYMPHOCYTES NFR BLD AUTO: 52.3 %
MCH RBC QN AUTO: 32.8 PG (ref 26.5–33)
MCHC RBC AUTO-ENTMCNC: 35.8 G/DL (ref 31.5–36.5)
MCV RBC AUTO: 91 FL (ref 78–100)
MONOCYTES # BLD AUTO: 0.4 10E9/L (ref 0–1.3)
MONOCYTES NFR BLD AUTO: 7.1 %
NEUTROPHILS # BLD AUTO: 2.3 10E9/L (ref 1.6–8.3)
NEUTROPHILS NFR BLD AUTO: 37.9 %
NRBC # BLD AUTO: 0 10*3/UL
NRBC BLD AUTO-RTO: 0 /100
PLATELET # BLD AUTO: 319 10E9/L (ref 150–450)
PROT SERPL-MCNC: 7.7 G/DL (ref 6.8–8.8)
RBC # BLD AUTO: 4.79 10E12/L (ref 4.4–5.9)
WBC # BLD AUTO: 6.1 10E9/L (ref 4–11)

## 2020-02-14 PROCEDURE — 80076 HEPATIC FUNCTION PANEL: CPT | Performed by: INTERNAL MEDICINE

## 2020-02-14 PROCEDURE — 85025 COMPLETE CBC W/AUTO DIFF WBC: CPT | Performed by: INTERNAL MEDICINE

## 2020-02-14 NOTE — PROGRESS NOTES
This is a recent snapshot of the patient's [facility] medical record.  For current drug dose and complete information and questions, call [phone] or In Basket pool, fv home infusion ([id number])  CSN Number:  [id number]

## 2020-03-12 ENCOUNTER — HOME INFUSION (PRE-WILLOW HOME INFUSION) (OUTPATIENT)
Dept: PHARMACY | Facility: CLINIC | Age: 30
End: 2020-03-12

## 2020-03-13 ENCOUNTER — MEDICAL CORRESPONDENCE (OUTPATIENT)
Dept: HEALTH INFORMATION MANAGEMENT | Facility: CLINIC | Age: 30
End: 2020-03-13

## 2020-03-13 LAB
ALBUMIN SERPL-MCNC: 4.3 G/DL (ref 3.4–5)
ALP SERPL-CCNC: 66 U/L (ref 40–150)
ALT SERPL W P-5'-P-CCNC: 29 U/L (ref 0–70)
AST SERPL W P-5'-P-CCNC: 17 U/L (ref 0–45)
BASOPHILS # BLD AUTO: 0 10E9/L (ref 0–0.2)
BASOPHILS NFR BLD AUTO: 0.2 %
BILIRUB DIRECT SERPL-MCNC: 0.2 MG/DL (ref 0–0.2)
BILIRUB SERPL-MCNC: 1.2 MG/DL (ref 0.2–1.3)
DIFFERENTIAL METHOD BLD: NORMAL
EOSINOPHIL # BLD AUTO: 0.1 10E9/L (ref 0–0.7)
EOSINOPHIL NFR BLD AUTO: 2 %
ERYTHROCYTE [DISTWIDTH] IN BLOOD BY AUTOMATED COUNT: 11.7 % (ref 10–15)
HCT VFR BLD AUTO: 46.5 % (ref 40–53)
HGB BLD-MCNC: 16.6 G/DL (ref 13.3–17.7)
IMM GRANULOCYTES # BLD: 0 10E9/L (ref 0–0.4)
IMM GRANULOCYTES NFR BLD: 0 %
LYMPHOCYTES # BLD AUTO: 2.4 10E9/L (ref 0.8–5.3)
LYMPHOCYTES NFR BLD AUTO: 40.3 %
MCH RBC QN AUTO: 32.6 PG (ref 26.5–33)
MCHC RBC AUTO-ENTMCNC: 35.7 G/DL (ref 31.5–36.5)
MCV RBC AUTO: 91 FL (ref 78–100)
MONOCYTES # BLD AUTO: 0.4 10E9/L (ref 0–1.3)
MONOCYTES NFR BLD AUTO: 7.2 %
NEUTROPHILS # BLD AUTO: 3 10E9/L (ref 1.6–8.3)
NEUTROPHILS NFR BLD AUTO: 50.3 %
NRBC # BLD AUTO: 0 10*3/UL
NRBC BLD AUTO-RTO: 0 /100
PLATELET # BLD AUTO: 256 10E9/L (ref 150–450)
PROT SERPL-MCNC: 8.3 G/DL (ref 6.8–8.8)
RBC # BLD AUTO: 5.09 10E12/L (ref 4.4–5.9)
WBC # BLD AUTO: 6 10E9/L (ref 4–11)

## 2020-03-13 PROCEDURE — 85025 COMPLETE CBC W/AUTO DIFF WBC: CPT | Performed by: INTERNAL MEDICINE

## 2020-03-13 PROCEDURE — 80076 HEPATIC FUNCTION PANEL: CPT | Performed by: INTERNAL MEDICINE

## 2020-03-16 ENCOUNTER — HOME INFUSION (PRE-WILLOW HOME INFUSION) (OUTPATIENT)
Dept: PHARMACY | Facility: CLINIC | Age: 30
End: 2020-03-16

## 2020-03-16 ENCOUNTER — MEDICAL CORRESPONDENCE (OUTPATIENT)
Dept: HEALTH INFORMATION MANAGEMENT | Facility: CLINIC | Age: 30
End: 2020-03-16

## 2020-03-17 NOTE — PROGRESS NOTES
This is a recent snapshot of the patient's [facility] medical record.  For current drug dose and complete information and questions, call [phone] or In Basket pool, fv home infusion ([id number])  CSN Number:  [id number]

## 2020-04-01 ENCOUNTER — APPOINTMENT (OUTPATIENT)
Dept: LAB | Facility: CLINIC | Age: 30
End: 2020-04-01
Attending: INTERNAL MEDICINE
Payer: COMMERCIAL

## 2020-04-07 ENCOUNTER — HOME INFUSION (PRE-WILLOW HOME INFUSION) (OUTPATIENT)
Dept: PHARMACY | Facility: CLINIC | Age: 30
End: 2020-04-07

## 2020-04-08 NOTE — PROGRESS NOTES
This is a recent snapshot of the patient's [facility] medical record.  For current drug dose and complete information and questions, call [phone] or In Basket pool, fv home infusion ([id number])  CSN Number:  [id number]

## 2020-04-09 ENCOUNTER — HOME INFUSION (PRE-WILLOW HOME INFUSION) (OUTPATIENT)
Dept: PHARMACY | Facility: CLINIC | Age: 30
End: 2020-04-09

## 2020-04-10 NOTE — PROGRESS NOTES
This is a recent snapshot of the patient's [facility] medical record.  For current drug dose and complete information and questions, call [phone] or In Basket pool, fv home infusion ([id number])  CSN Number:  [id number]

## 2020-04-13 ENCOUNTER — MEDICAL CORRESPONDENCE (OUTPATIENT)
Dept: HEALTH INFORMATION MANAGEMENT | Facility: CLINIC | Age: 30
End: 2020-04-13

## 2020-04-13 LAB
ALBUMIN SERPL-MCNC: 3.9 G/DL (ref 3.4–5)
ALP SERPL-CCNC: 54 U/L (ref 40–150)
ALT SERPL W P-5'-P-CCNC: 23 U/L (ref 0–70)
AST SERPL W P-5'-P-CCNC: 19 U/L (ref 0–45)
BASOPHILS # BLD AUTO: 0 10E9/L (ref 0–0.2)
BASOPHILS NFR BLD AUTO: 0.2 %
BILIRUB DIRECT SERPL-MCNC: 0.2 MG/DL (ref 0–0.2)
BILIRUB SERPL-MCNC: 1.3 MG/DL (ref 0.2–1.3)
DIFFERENTIAL METHOD BLD: NORMAL
EOSINOPHIL # BLD AUTO: 0.1 10E9/L (ref 0–0.7)
EOSINOPHIL NFR BLD AUTO: 2.2 %
ERYTHROCYTE [DISTWIDTH] IN BLOOD BY AUTOMATED COUNT: 11.7 % (ref 10–15)
HCT VFR BLD AUTO: 43 % (ref 40–53)
HGB BLD-MCNC: 15.4 G/DL (ref 13.3–17.7)
IMM GRANULOCYTES # BLD: 0 10E9/L (ref 0–0.4)
IMM GRANULOCYTES NFR BLD: 0.2 %
LYMPHOCYTES # BLD AUTO: 2.4 10E9/L (ref 0.8–5.3)
LYMPHOCYTES NFR BLD AUTO: 49.1 %
MCH RBC QN AUTO: 32.2 PG (ref 26.5–33)
MCHC RBC AUTO-ENTMCNC: 35.8 G/DL (ref 31.5–36.5)
MCV RBC AUTO: 90 FL (ref 78–100)
MONOCYTES # BLD AUTO: 0.3 10E9/L (ref 0–1.3)
MONOCYTES NFR BLD AUTO: 5.8 %
NEUTROPHILS # BLD AUTO: 2.1 10E9/L (ref 1.6–8.3)
NEUTROPHILS NFR BLD AUTO: 42.5 %
NRBC # BLD AUTO: 0 10*3/UL
NRBC BLD AUTO-RTO: 0 /100
PLATELET # BLD AUTO: 264 10E9/L (ref 150–450)
PROT SERPL-MCNC: 7.5 G/DL (ref 6.8–8.8)
RBC # BLD AUTO: 4.78 10E12/L (ref 4.4–5.9)
WBC # BLD AUTO: 5 10E9/L (ref 4–11)

## 2020-04-13 PROCEDURE — 80076 HEPATIC FUNCTION PANEL: CPT | Performed by: INTERNAL MEDICINE

## 2020-04-13 PROCEDURE — 85025 COMPLETE CBC W/AUTO DIFF WBC: CPT | Performed by: INTERNAL MEDICINE

## 2020-04-14 ENCOUNTER — TRANSFERRED RECORDS (OUTPATIENT)
Dept: HEALTH INFORMATION MANAGEMENT | Facility: CLINIC | Age: 30
End: 2020-04-14

## 2020-04-14 ENCOUNTER — MEDICAL CORRESPONDENCE (OUTPATIENT)
Dept: HEALTH INFORMATION MANAGEMENT | Facility: CLINIC | Age: 30
End: 2020-04-14

## 2020-04-14 DIAGNOSIS — K50.10 CROHN'S COLITIS (H): Primary | ICD-10-CM

## 2020-05-07 ENCOUNTER — HOME INFUSION (PRE-WILLOW HOME INFUSION) (OUTPATIENT)
Dept: PHARMACY | Facility: CLINIC | Age: 30
End: 2020-05-07

## 2020-05-08 NOTE — PROGRESS NOTES
This is a recent snapshot of the patient's [facility] medical record.  For current drug dose and complete information and questions, call [phone] or In Basket pool, fv home infusion ([id number])  CSN Number:  [id number]

## 2020-05-12 ENCOUNTER — HOME INFUSION (PRE-WILLOW HOME INFUSION) (OUTPATIENT)
Dept: PHARMACY | Facility: CLINIC | Age: 30
End: 2020-05-12

## 2020-05-13 NOTE — PROGRESS NOTES
This is a recent snapshot of the patient's [facility] medical record.  For current drug dose and complete information and questions, call [phone] or In Basket pool, fv home infusion ([id number])  CSN Number:  [id number]

## 2020-05-14 ENCOUNTER — HOME INFUSION (PRE-WILLOW HOME INFUSION) (OUTPATIENT)
Dept: PHARMACY | Facility: CLINIC | Age: 30
End: 2020-05-14

## 2020-05-14 ENCOUNTER — MEDICAL CORRESPONDENCE (OUTPATIENT)
Dept: HEALTH INFORMATION MANAGEMENT | Facility: CLINIC | Age: 30
End: 2020-05-14

## 2020-05-14 LAB
ALBUMIN SERPL-MCNC: 4.1 G/DL (ref 3.4–5)
ALP SERPL-CCNC: 61 U/L (ref 40–150)
ALT SERPL W P-5'-P-CCNC: 27 U/L (ref 0–70)
AST SERPL W P-5'-P-CCNC: 17 U/L (ref 0–45)
BASOPHILS # BLD AUTO: 0 10E9/L (ref 0–0.2)
BASOPHILS NFR BLD AUTO: 0.2 %
BILIRUB DIRECT SERPL-MCNC: 0.2 MG/DL (ref 0–0.2)
BILIRUB SERPL-MCNC: 1.4 MG/DL (ref 0.2–1.3)
DIFFERENTIAL METHOD BLD: NORMAL
EOSINOPHIL # BLD AUTO: 0.1 10E9/L (ref 0–0.7)
EOSINOPHIL NFR BLD AUTO: 1.9 %
ERYTHROCYTE [DISTWIDTH] IN BLOOD BY AUTOMATED COUNT: 12 % (ref 10–15)
HCT VFR BLD AUTO: 43.9 % (ref 40–53)
HGB BLD-MCNC: 15.8 G/DL (ref 13.3–17.7)
IMM GRANULOCYTES # BLD: 0 10E9/L (ref 0–0.4)
IMM GRANULOCYTES NFR BLD: 0.2 %
LYMPHOCYTES # BLD AUTO: 2.6 10E9/L (ref 0.8–5.3)
LYMPHOCYTES NFR BLD AUTO: 44.9 %
MCH RBC QN AUTO: 32.4 PG (ref 26.5–33)
MCHC RBC AUTO-ENTMCNC: 36 G/DL (ref 31.5–36.5)
MCV RBC AUTO: 90 FL (ref 78–100)
MONOCYTES # BLD AUTO: 0.5 10E9/L (ref 0–1.3)
MONOCYTES NFR BLD AUTO: 8.2 %
NEUTROPHILS # BLD AUTO: 2.6 10E9/L (ref 1.6–8.3)
NEUTROPHILS NFR BLD AUTO: 44.6 %
NRBC # BLD AUTO: 0 10*3/UL
NRBC BLD AUTO-RTO: 0 /100
PLATELET # BLD AUTO: 260 10E9/L (ref 150–450)
PROT SERPL-MCNC: 8 G/DL (ref 6.8–8.8)
RBC # BLD AUTO: 4.88 10E12/L (ref 4.4–5.9)
WBC # BLD AUTO: 5.7 10E9/L (ref 4–11)

## 2020-05-14 PROCEDURE — 40001033 ZZHCL STATISTIC INFLIXIMAB: Performed by: INTERNAL MEDICINE

## 2020-05-14 PROCEDURE — 80076 HEPATIC FUNCTION PANEL: CPT | Performed by: INTERNAL MEDICINE

## 2020-05-14 PROCEDURE — 85025 COMPLETE CBC W/AUTO DIFF WBC: CPT | Performed by: INTERNAL MEDICINE

## 2020-05-15 ENCOUNTER — HOME INFUSION (PRE-WILLOW HOME INFUSION) (OUTPATIENT)
Dept: PHARMACY | Facility: CLINIC | Age: 30
End: 2020-05-15

## 2020-05-18 NOTE — PROGRESS NOTES
This is a recent snapshot of the patient's [facility] medical record.  For current drug dose and complete information and questions, call [phone] or In Basket pool, fv home infusion ([id number])  CSN Number:  [id number]

## 2020-05-18 NOTE — PROGRESS NOTES
This is a recent snapshot of the patient's [facility] medical record.  For current drug dose and complete information and questions, call [phone] or In Basket pool, fv home infusion ([id number])  CSN Number:  [id number]

## 2020-05-21 LAB — LAB SCANNED RESULT: NORMAL

## 2020-06-03 ENCOUNTER — MYC MEDICAL ADVICE (OUTPATIENT)
Dept: OPHTHALMOLOGY | Facility: CLINIC | Age: 30
End: 2020-06-03

## 2020-06-03 ENCOUNTER — MEDICAL CORRESPONDENCE (OUTPATIENT)
Dept: HEALTH INFORMATION MANAGEMENT | Facility: CLINIC | Age: 30
End: 2020-06-03

## 2020-06-03 ENCOUNTER — TRANSFERRED RECORDS (OUTPATIENT)
Dept: HEALTH INFORMATION MANAGEMENT | Facility: CLINIC | Age: 30
End: 2020-06-03

## 2020-06-09 ENCOUNTER — HOME INFUSION (PRE-WILLOW HOME INFUSION) (OUTPATIENT)
Dept: PHARMACY | Facility: CLINIC | Age: 30
End: 2020-06-09

## 2020-06-09 ENCOUNTER — TELEPHONE (OUTPATIENT)
Dept: INTERNAL MEDICINE | Facility: CLINIC | Age: 30
End: 2020-06-09

## 2020-06-09 RX ORDER — INFLIXIMAB 100 MG/10ML
INJECTION, POWDER, LYOPHILIZED, FOR SOLUTION INTRAVENOUS
COMMUNITY
Start: 2020-06-09

## 2020-06-09 NOTE — TELEPHONE ENCOUNTER
Health Call Center    Phone Message    May a detailed message be left on voicemail: yes     Reason for Call: Order(s): Other:   Reason for requested: Needs updated orders for inFLIXimab (REMICADE) 100 MG injection - patient stated that Dr. Medrano changed from every 4 weeks to every 8 weeks. They do not have an updated order with this change. Please call Darlene back at [phone]. She will take a verbal order. Okay to leave detailed message if you do not reach her.   Date needed: Whenever possible  Provider name: Dr. Medrano      Action Taken: Message routed to:  [facility] Primary Care    Travel Screening: Not Applicable                      I changed the frequency in the med list.  JL

## 2020-06-09 NOTE — TELEPHONE ENCOUNTER
Darlene updated via phone with medication change (infliximab 100 mg IV every 8 weeks).     Mary Potts) KYAW George

## 2020-06-10 NOTE — PROGRESS NOTES
This is a recent snapshot of the patient's [facility] medical record.  For current drug dose and complete information and questions, call [phone] or In Basket pool, fv home infusion ([id number])  CSN Number:  [id number]

## 2020-06-17 ENCOUNTER — MEDICAL CORRESPONDENCE (OUTPATIENT)
Dept: HEALTH INFORMATION MANAGEMENT | Facility: CLINIC | Age: 30
End: 2020-06-17

## 2020-07-08 ENCOUNTER — HOME INFUSION (PRE-WILLOW HOME INFUSION) (OUTPATIENT)
Dept: PHARMACY | Facility: CLINIC | Age: 30
End: 2020-07-08

## 2020-07-09 LAB
ALBUMIN SERPL-MCNC: 3.9 G/DL (ref 3.4–5)
ALP SERPL-CCNC: 54 U/L (ref 40–150)
ALT SERPL W P-5'-P-CCNC: 22 U/L (ref 0–70)
AST SERPL W P-5'-P-CCNC: 20 U/L (ref 0–45)
BASOPHILS # BLD AUTO: 0 10E9/L (ref 0–0.2)
BASOPHILS NFR BLD AUTO: 0.4 %
BILIRUB DIRECT SERPL-MCNC: 0.2 MG/DL (ref 0–0.2)
BILIRUB SERPL-MCNC: 1.9 MG/DL (ref 0.2–1.3)
DIFFERENTIAL METHOD BLD: ABNORMAL
EOSINOPHIL # BLD AUTO: 0.1 10E9/L (ref 0–0.7)
EOSINOPHIL NFR BLD AUTO: 2.1 %
ERYTHROCYTE [DISTWIDTH] IN BLOOD BY AUTOMATED COUNT: 12 % (ref 10–15)
HCT VFR BLD AUTO: 45.7 % (ref 40–53)
HGB BLD-MCNC: 16.2 G/DL (ref 13.3–17.7)
IMM GRANULOCYTES # BLD: 0 10E9/L (ref 0–0.4)
IMM GRANULOCYTES NFR BLD: 0.2 %
LYMPHOCYTES # BLD AUTO: 2.3 10E9/L (ref 0.8–5.3)
LYMPHOCYTES NFR BLD AUTO: 43.3 %
MCH RBC QN AUTO: 32.6 PG (ref 26.5–33)
MCHC RBC AUTO-ENTMCNC: 35.4 G/DL (ref 31.5–36.5)
MCV RBC AUTO: 92 FL (ref 78–100)
MONOCYTES # BLD AUTO: 0.4 10E9/L (ref 0–1.3)
MONOCYTES NFR BLD AUTO: 8.1 %
NEUTROPHILS # BLD AUTO: 2.5 10E9/L (ref 1.6–8.3)
NEUTROPHILS NFR BLD AUTO: 45.9 %
NRBC # BLD AUTO: 0 10*3/UL
NRBC BLD AUTO-RTO: 1 /100
PLATELET # BLD AUTO: 281 10E9/L (ref 150–450)
PROT SERPL-MCNC: 8 G/DL (ref 6.8–8.8)
RBC # BLD AUTO: 4.97 10E12/L (ref 4.4–5.9)
WBC # BLD AUTO: 5.3 10E9/L (ref 4–11)

## 2020-07-09 PROCEDURE — 80076 HEPATIC FUNCTION PANEL: CPT | Performed by: INTERNAL MEDICINE

## 2020-07-09 PROCEDURE — 85025 COMPLETE CBC W/AUTO DIFF WBC: CPT | Performed by: INTERNAL MEDICINE

## 2020-07-09 NOTE — PROGRESS NOTES
This is a recent snapshot of the patient's [facility] medical record.  For current drug dose and complete information and questions, call [phone] or In Basket pool, fv home infusion ([id number])  CSN Number:  [id number]

## 2020-08-05 ENCOUNTER — MEDICAL CORRESPONDENCE (OUTPATIENT)
Dept: HEALTH INFORMATION MANAGEMENT | Facility: CLINIC | Age: 30
End: 2020-08-05

## 2020-09-01 ENCOUNTER — HOME INFUSION (PRE-WILLOW HOME INFUSION) (OUTPATIENT)
Dept: PHARMACY | Facility: CLINIC | Age: 30
End: 2020-09-01

## 2020-09-02 ENCOUNTER — HOME INFUSION (PRE-WILLOW HOME INFUSION) (OUTPATIENT)
Dept: PHARMACY | Facility: CLINIC | Age: 30
End: 2020-09-02

## 2020-09-02 NOTE — PROGRESS NOTES
This is a recent snapshot of the patient's [facility] medical record.  For current drug dose and complete information and questions, call [phone] or In Basket pool, fv home infusion ([id number])  CSN Number:  [id number]

## 2020-09-03 NOTE — PROGRESS NOTES
This is a recent snapshot of the patient's [facility] medical record.  For current drug dose and complete information and questions, call [phone] or In Basket pool, fv home infusion ([id number])  CSN Number:  [id number]

## 2020-09-04 LAB
ALBUMIN SERPL-MCNC: 3.9 G/DL (ref 3.4–5)
ALP SERPL-CCNC: 59 U/L (ref 40–150)
ALT SERPL W P-5'-P-CCNC: 30 U/L (ref 0–70)
AST SERPL W P-5'-P-CCNC: 17 U/L (ref 0–45)
BASOPHILS # BLD AUTO: 0 10E9/L (ref 0–0.2)
BASOPHILS NFR BLD AUTO: 0.2 %
BILIRUB DIRECT SERPL-MCNC: 0.2 MG/DL (ref 0–0.2)
BILIRUB SERPL-MCNC: 1.4 MG/DL (ref 0.2–1.3)
DIFFERENTIAL METHOD BLD: NORMAL
EOSINOPHIL # BLD AUTO: 0.2 10E9/L (ref 0–0.7)
EOSINOPHIL NFR BLD AUTO: 3.3 %
ERYTHROCYTE [DISTWIDTH] IN BLOOD BY AUTOMATED COUNT: 11.8 % (ref 10–15)
HCT VFR BLD AUTO: 47.3 % (ref 40–53)
HGB BLD-MCNC: 16.3 G/DL (ref 13.3–17.7)
IMM GRANULOCYTES # BLD: 0 10E9/L (ref 0–0.4)
IMM GRANULOCYTES NFR BLD: 0.2 %
LYMPHOCYTES # BLD AUTO: 2.3 10E9/L (ref 0.8–5.3)
LYMPHOCYTES NFR BLD AUTO: 39.7 %
MCH RBC QN AUTO: 31.8 PG (ref 26.5–33)
MCHC RBC AUTO-ENTMCNC: 34.5 G/DL (ref 31.5–36.5)
MCV RBC AUTO: 92 FL (ref 78–100)
MONOCYTES # BLD AUTO: 0.5 10E9/L (ref 0–1.3)
MONOCYTES NFR BLD AUTO: 8 %
NEUTROPHILS # BLD AUTO: 2.8 10E9/L (ref 1.6–8.3)
NEUTROPHILS NFR BLD AUTO: 48.6 %
NRBC # BLD AUTO: 0 10*3/UL
NRBC BLD AUTO-RTO: 0 /100
PLATELET # BLD AUTO: 247 10E9/L (ref 150–450)
PROT SERPL-MCNC: 7.8 G/DL (ref 6.8–8.8)
RBC # BLD AUTO: 5.12 10E12/L (ref 4.4–5.9)
WBC # BLD AUTO: 5.7 10E9/L (ref 4–11)

## 2020-09-04 PROCEDURE — 85025 COMPLETE CBC W/AUTO DIFF WBC: CPT | Performed by: INTERNAL MEDICINE

## 2020-09-04 PROCEDURE — 80076 HEPATIC FUNCTION PANEL: CPT | Performed by: INTERNAL MEDICINE

## 2020-09-27 ENCOUNTER — MYC MEDICAL ADVICE (OUTPATIENT)
Dept: INTERNAL MEDICINE | Facility: CLINIC | Age: 30
End: 2020-09-27

## 2020-10-01 ENCOUNTER — APPOINTMENT (OUTPATIENT)
Dept: LAB | Facility: CLINIC | Age: 30
End: 2020-10-01
Attending: INTERNAL MEDICINE
Payer: COMMERCIAL

## 2020-10-28 ENCOUNTER — HOME INFUSION (PRE-WILLOW HOME INFUSION) (OUTPATIENT)
Dept: PHARMACY | Facility: CLINIC | Age: 30
End: 2020-10-28

## 2020-10-29 NOTE — PROGRESS NOTES
This is a recent snapshot of the patient's [facility] medical record.  For current drug dose and complete information and questions, call [phone] or In Basket pool, fv home infusion ([id number])  CSN Number:  [id number]

## 2020-11-01 ENCOUNTER — APPOINTMENT (OUTPATIENT)
Dept: LAB | Facility: CLINIC | Age: 30
End: 2020-11-01
Attending: INTERNAL MEDICINE
Payer: COMMERCIAL

## 2020-11-02 ENCOUNTER — HOME INFUSION (PRE-WILLOW HOME INFUSION) (OUTPATIENT)
Dept: PHARMACY | Facility: CLINIC | Age: 30
End: 2020-11-02

## 2020-11-03 NOTE — PROGRESS NOTES
This is a recent snapshot of the patient's [facility] medical record.  For current drug dose and complete information and questions, call [phone] or In Basket pool, fv home infusion ([id number])  CSN Number:  [id number]

## 2020-11-04 LAB
ALBUMIN SERPL-MCNC: 3.8 G/DL (ref 3.4–5)
ALP SERPL-CCNC: 60 U/L (ref 40–150)
ALT SERPL W P-5'-P-CCNC: 30 U/L (ref 0–70)
AST SERPL W P-5'-P-CCNC: 27 U/L (ref 0–45)
BASOPHILS # BLD AUTO: 0 10E9/L (ref 0–0.2)
BASOPHILS NFR BLD AUTO: 0.4 %
BILIRUB DIRECT SERPL-MCNC: 0.2 MG/DL (ref 0–0.2)
BILIRUB SERPL-MCNC: 1.8 MG/DL (ref 0.2–1.3)
DIFFERENTIAL METHOD BLD: NORMAL
EOSINOPHIL # BLD AUTO: 0.1 10E9/L (ref 0–0.7)
EOSINOPHIL NFR BLD AUTO: 2.3 %
ERYTHROCYTE [DISTWIDTH] IN BLOOD BY AUTOMATED COUNT: 11.9 % (ref 10–15)
HCT VFR BLD AUTO: 46.3 % (ref 40–53)
HGB BLD-MCNC: 16.4 G/DL (ref 13.3–17.7)
IMM GRANULOCYTES # BLD: 0 10E9/L (ref 0–0.4)
IMM GRANULOCYTES NFR BLD: 0.2 %
LYMPHOCYTES # BLD AUTO: 2.5 10E9/L (ref 0.8–5.3)
LYMPHOCYTES NFR BLD AUTO: 44 %
MCH RBC QN AUTO: 32.3 PG (ref 26.5–33)
MCHC RBC AUTO-ENTMCNC: 35.4 G/DL (ref 31.5–36.5)
MCV RBC AUTO: 91 FL (ref 78–100)
MONOCYTES # BLD AUTO: 0.4 10E9/L (ref 0–1.3)
MONOCYTES NFR BLD AUTO: 7.2 %
NEUTROPHILS # BLD AUTO: 2.6 10E9/L (ref 1.6–8.3)
NEUTROPHILS NFR BLD AUTO: 45.9 %
NRBC # BLD AUTO: 0 10*3/UL
NRBC BLD AUTO-RTO: 0 /100
PLATELET # BLD AUTO: 278 10E9/L (ref 150–450)
PROT SERPL-MCNC: 7.7 G/DL (ref 6.8–8.8)
RBC # BLD AUTO: 5.08 10E12/L (ref 4.4–5.9)
WBC # BLD AUTO: 5.7 10E9/L (ref 4–11)

## 2020-11-04 PROCEDURE — 80076 HEPATIC FUNCTION PANEL: CPT | Performed by: INTERNAL MEDICINE

## 2020-11-04 PROCEDURE — 85025 COMPLETE CBC W/AUTO DIFF WBC: CPT | Performed by: INTERNAL MEDICINE

## 2020-12-28 ENCOUNTER — HOME INFUSION (PRE-WILLOW HOME INFUSION) (OUTPATIENT)
Dept: PHARMACY | Facility: CLINIC | Age: 30
End: 2020-12-28

## 2020-12-30 NOTE — PROGRESS NOTES
This is a recent snapshot of the patient's [facility] medical record.  For current drug dose and complete information and questions, call [phone] or In [facility] pool, fv home infusion ([id number])  CSN Number:  [id number]

## 2020-12-31 ENCOUNTER — MEDICAL CORRESPONDENCE (OUTPATIENT)
Dept: HEALTH INFORMATION MANAGEMENT | Facility: CLINIC | Age: 30
End: 2020-12-31

## 2020-12-31 LAB
ALBUMIN SERPL-MCNC: 3.9 G/DL (ref 3.4–5)
ALP SERPL-CCNC: 53 U/L (ref 40–150)
ALT SERPL W P-5'-P-CCNC: 32 U/L (ref 0–70)
AST SERPL W P-5'-P-CCNC: 19 U/L (ref 0–45)
BASOPHILS # BLD AUTO: 0 10E9/L (ref 0–0.2)
BASOPHILS NFR BLD AUTO: 0.6 %
BILIRUB DIRECT SERPL-MCNC: 0.3 MG/DL (ref 0–0.2)
BILIRUB SERPL-MCNC: 1.4 MG/DL (ref 0.2–1.3)
DIFFERENTIAL METHOD BLD: NORMAL
EOSINOPHIL # BLD AUTO: 0.2 10E9/L (ref 0–0.7)
EOSINOPHIL NFR BLD AUTO: 2.8 %
ERYTHROCYTE [DISTWIDTH] IN BLOOD BY AUTOMATED COUNT: 11.3 % (ref 10–15)
HCT VFR BLD AUTO: 46.8 % (ref 40–53)
HGB BLD-MCNC: 16.3 G/DL (ref 13.3–17.7)
IMM GRANULOCYTES # BLD: 0 10E9/L (ref 0–0.4)
IMM GRANULOCYTES NFR BLD: 0.2 %
LYMPHOCYTES # BLD AUTO: 2.4 10E9/L (ref 0.8–5.3)
LYMPHOCYTES NFR BLD AUTO: 45.3 %
MCH RBC QN AUTO: 31.7 PG (ref 26.5–33)
MCHC RBC AUTO-ENTMCNC: 34.8 G/DL (ref 31.5–36.5)
MCV RBC AUTO: 91 FL (ref 78–100)
MONOCYTES # BLD AUTO: 0.3 10E9/L (ref 0–1.3)
MONOCYTES NFR BLD AUTO: 5.6 %
NEUTROPHILS # BLD AUTO: 2.5 10E9/L (ref 1.6–8.3)
NEUTROPHILS NFR BLD AUTO: 45.5 %
NRBC # BLD AUTO: 0 10*3/UL
NRBC BLD AUTO-RTO: 0 /100
PLATELET # BLD AUTO: 256 10E9/L (ref 150–450)
PROT SERPL-MCNC: 7.7 G/DL (ref 6.8–8.8)
RBC # BLD AUTO: 5.14 10E12/L (ref 4.4–5.9)
WBC # BLD AUTO: 5.4 10E9/L (ref 4–11)

## 2020-12-31 PROCEDURE — 85025 COMPLETE CBC W/AUTO DIFF WBC: CPT | Performed by: INTERNAL MEDICINE

## 2020-12-31 PROCEDURE — 80076 HEPATIC FUNCTION PANEL: CPT | Performed by: INTERNAL MEDICINE

## 2021-01-01 ENCOUNTER — APPOINTMENT (OUTPATIENT)
Dept: LAB | Facility: CLINIC | Age: 31
End: 2021-01-01
Attending: INTERNAL MEDICINE
Payer: COMMERCIAL

## 2021-01-15 ENCOUNTER — HEALTH MAINTENANCE LETTER (OUTPATIENT)
Age: 31
End: 2021-01-15

## 2021-01-27 ENCOUNTER — HOME INFUSION (PRE-WILLOW HOME INFUSION) (OUTPATIENT)
Dept: PHARMACY | Facility: CLINIC | Age: 31
End: 2021-01-27

## 2021-01-28 NOTE — PROGRESS NOTES
This is a recent snapshot of the patient's [facility] medical record.  For current drug dose and complete information and questions, call [phone] or In Basket pool, fv home infusion ([id number])  CSN Number:  [id number]

## 2021-02-01 ENCOUNTER — APPOINTMENT (OUTPATIENT)
Dept: LAB | Facility: CLINIC | Age: 31
End: 2021-02-01
Attending: INTERNAL MEDICINE
Payer: COMMERCIAL

## 2021-02-03 ENCOUNTER — MYC MEDICAL ADVICE (OUTPATIENT)
Dept: INTERNAL MEDICINE | Facility: CLINIC | Age: 31
End: 2021-02-03

## 2021-02-03 ENCOUNTER — HOME INFUSION (PRE-WILLOW HOME INFUSION) (OUTPATIENT)
Dept: PHARMACY | Facility: CLINIC | Age: 31
End: 2021-02-03

## 2021-02-04 NOTE — PROGRESS NOTES
This is a recent snapshot of the patient's [facility] medical record.  For current drug dose and complete information and questions, call [phone] or In Basket pool, fv home infusion ([id number])  CSN Number:  [id number]

## 2021-02-05 ENCOUNTER — MEDICAL CORRESPONDENCE (OUTPATIENT)
Dept: HEALTH INFORMATION MANAGEMENT | Facility: CLINIC | Age: 31
End: 2021-02-05

## 2021-02-09 ENCOUNTER — HOME INFUSION (PRE-WILLOW HOME INFUSION) (OUTPATIENT)
Dept: PHARMACY | Facility: CLINIC | Age: 31
End: 2021-02-09

## 2021-02-10 NOTE — PROGRESS NOTES
This is a recent snapshot of the patient's [facility] medical record.  For current drug dose and complete information and questions, call [phone] or In Basket pool, fv home infusion ([id number])  CSN Number:  [id number]

## 2021-02-23 ENCOUNTER — HOME INFUSION (PRE-WILLOW HOME INFUSION) (OUTPATIENT)
Dept: PHARMACY | Facility: CLINIC | Age: 31
End: 2021-02-23

## 2021-02-25 LAB
ALBUMIN SERPL-MCNC: 4 G/DL (ref 3.4–5)
ALP SERPL-CCNC: 55 U/L (ref 40–150)
ALT SERPL W P-5'-P-CCNC: 30 U/L (ref 0–70)
AST SERPL W P-5'-P-CCNC: 27 U/L (ref 0–45)
BASOPHILS # BLD AUTO: 0 10E9/L (ref 0–0.2)
BASOPHILS NFR BLD AUTO: 0.3 %
BILIRUB DIRECT SERPL-MCNC: 0.2 MG/DL (ref 0–0.2)
BILIRUB SERPL-MCNC: 1.1 MG/DL (ref 0.2–1.3)
DIFFERENTIAL METHOD BLD: NORMAL
EOSINOPHIL # BLD AUTO: 0.1 10E9/L (ref 0–0.7)
EOSINOPHIL NFR BLD AUTO: 2 %
ERYTHROCYTE [DISTWIDTH] IN BLOOD BY AUTOMATED COUNT: 11.7 % (ref 10–15)
HCT VFR BLD AUTO: 46.3 % (ref 40–53)
HGB BLD-MCNC: 16.5 G/DL (ref 13.3–17.7)
IMM GRANULOCYTES # BLD: 0 10E9/L (ref 0–0.4)
IMM GRANULOCYTES NFR BLD: 0.2 %
LYMPHOCYTES # BLD AUTO: 2.5 10E9/L (ref 0.8–5.3)
LYMPHOCYTES NFR BLD AUTO: 42.2 %
MCH RBC QN AUTO: 32 PG (ref 26.5–33)
MCHC RBC AUTO-ENTMCNC: 35.6 G/DL (ref 31.5–36.5)
MCV RBC AUTO: 90 FL (ref 78–100)
MONOCYTES # BLD AUTO: 0.4 10E9/L (ref 0–1.3)
MONOCYTES NFR BLD AUTO: 7.3 %
NEUTROPHILS # BLD AUTO: 2.9 10E9/L (ref 1.6–8.3)
NEUTROPHILS NFR BLD AUTO: 48 %
NRBC # BLD AUTO: 0 10*3/UL
NRBC BLD AUTO-RTO: 0 /100
PLATELET # BLD AUTO: 293 10E9/L (ref 150–450)
PROT SERPL-MCNC: 8.1 G/DL (ref 6.8–8.8)
RBC # BLD AUTO: 5.16 10E12/L (ref 4.4–5.9)
WBC # BLD AUTO: 5.9 10E9/L (ref 4–11)

## 2021-02-25 PROCEDURE — 85025 COMPLETE CBC W/AUTO DIFF WBC: CPT | Performed by: INTERNAL MEDICINE

## 2021-02-25 PROCEDURE — 80076 HEPATIC FUNCTION PANEL: CPT | Performed by: INTERNAL MEDICINE

## 2021-02-25 NOTE — PROGRESS NOTES
This is a recent snapshot of the patient's [facility] medical record.  For current drug dose and complete information and questions, call [phone] or In Basket pool, fv home infusion ([id number])  CSN Number:  [id number]

## 2021-03-01 ENCOUNTER — APPOINTMENT (OUTPATIENT)
Dept: LAB | Facility: CLINIC | Age: 31
End: 2021-03-01
Attending: INTERNAL MEDICINE
Payer: COMMERCIAL

## 2021-03-06 ENCOUNTER — MEDICAL CORRESPONDENCE (OUTPATIENT)
Dept: HEALTH INFORMATION MANAGEMENT | Facility: CLINIC | Age: 31
End: 2021-03-06

## 2021-03-08 ENCOUNTER — MEDICAL CORRESPONDENCE (OUTPATIENT)
Dept: HEALTH INFORMATION MANAGEMENT | Facility: CLINIC | Age: 31
End: 2021-03-08

## 2021-04-20 ENCOUNTER — HOME INFUSION (PRE-WILLOW HOME INFUSION) (OUTPATIENT)
Dept: PHARMACY | Facility: CLINIC | Age: 31
End: 2021-04-20

## 2021-04-21 NOTE — PROGRESS NOTES
This is a recent snapshot of the patient's [facility] medical record.  For current drug dose and complete information and questions, call [phone] or In Basket pool, fv home infusion ([id number])  CSN Number:  [id number]

## 2021-04-27 ENCOUNTER — HOME INFUSION (PRE-WILLOW HOME INFUSION) (OUTPATIENT)
Dept: PHARMACY | Facility: CLINIC | Age: 31
End: 2021-04-27

## 2021-04-29 LAB
ALBUMIN SERPL-MCNC: 3.9 G/DL (ref 3.4–5)
ALP SERPL-CCNC: 56 U/L (ref 40–150)
ALT SERPL W P-5'-P-CCNC: 29 U/L (ref 0–70)
AST SERPL W P-5'-P-CCNC: 18 U/L (ref 0–45)
BASOPHILS # BLD AUTO: 0 10E9/L (ref 0–0.2)
BASOPHILS NFR BLD AUTO: 0.4 %
BILIRUB DIRECT SERPL-MCNC: 0.2 MG/DL (ref 0–0.2)
BILIRUB SERPL-MCNC: 1.1 MG/DL (ref 0.2–1.3)
DIFFERENTIAL METHOD BLD: NORMAL
EOSINOPHIL # BLD AUTO: 0.2 10E9/L (ref 0–0.7)
EOSINOPHIL NFR BLD AUTO: 2.8 %
ERYTHROCYTE [DISTWIDTH] IN BLOOD BY AUTOMATED COUNT: 11.9 % (ref 10–15)
HCT VFR BLD AUTO: 45.4 % (ref 40–53)
HGB BLD-MCNC: 15.8 G/DL (ref 13.3–17.7)
IMM GRANULOCYTES # BLD: 0 10E9/L (ref 0–0.4)
IMM GRANULOCYTES NFR BLD: 0 %
LYMPHOCYTES # BLD AUTO: 2.2 10E9/L (ref 0.8–5.3)
LYMPHOCYTES NFR BLD AUTO: 41.5 %
MCH RBC QN AUTO: 31.7 PG (ref 26.5–33)
MCHC RBC AUTO-ENTMCNC: 34.8 G/DL (ref 31.5–36.5)
MCV RBC AUTO: 91 FL (ref 78–100)
MONOCYTES # BLD AUTO: 0.5 10E9/L (ref 0–1.3)
MONOCYTES NFR BLD AUTO: 8.9 %
NEUTROPHILS # BLD AUTO: 2.5 10E9/L (ref 1.6–8.3)
NEUTROPHILS NFR BLD AUTO: 46.4 %
NRBC # BLD AUTO: 0 10*3/UL
NRBC BLD AUTO-RTO: 0 /100
PLATELET # BLD AUTO: 267 10E9/L (ref 150–450)
PROT SERPL-MCNC: 7.5 G/DL (ref 6.8–8.8)
RBC # BLD AUTO: 4.98 10E12/L (ref 4.4–5.9)
WBC # BLD AUTO: 5.3 10E9/L (ref 4–11)

## 2021-04-29 PROCEDURE — 85025 COMPLETE CBC W/AUTO DIFF WBC: CPT | Performed by: INTERNAL MEDICINE

## 2021-04-29 PROCEDURE — 80076 HEPATIC FUNCTION PANEL: CPT | Performed by: INTERNAL MEDICINE

## 2021-04-29 NOTE — PROGRESS NOTES
This is a recent snapshot of the patient's [facility] medical record.  For current drug dose and complete information and questions, call [phone] or In Basket pool, fv home infusion ([id number])  CSN Number:  [id number]

## 2021-06-07 ENCOUNTER — MEDICAL CORRESPONDENCE (OUTPATIENT)
Dept: HEALTH INFORMATION MANAGEMENT | Facility: CLINIC | Age: 31
End: 2021-06-07

## 2021-06-18 ENCOUNTER — HOME INFUSION (PRE-WILLOW HOME INFUSION) (OUTPATIENT)
Dept: PHARMACY | Facility: CLINIC | Age: 31
End: 2021-06-18

## 2021-06-22 ENCOUNTER — HOME INFUSION (PRE-WILLOW HOME INFUSION) (OUTPATIENT)
Dept: PHARMACY | Facility: CLINIC | Age: 31
End: 2021-06-22

## 2021-06-24 LAB
ALBUMIN SERPL-MCNC: 4 G/DL (ref 3.4–5)
ALP SERPL-CCNC: 49 U/L (ref 40–150)
ALT SERPL W P-5'-P-CCNC: 42 U/L (ref 0–70)
AST SERPL W P-5'-P-CCNC: 20 U/L (ref 0–45)
BASOPHILS # BLD AUTO: 0 10E9/L (ref 0–0.2)
BASOPHILS NFR BLD AUTO: 0.2 %
BILIRUB DIRECT SERPL-MCNC: 0.2 MG/DL (ref 0–0.2)
BILIRUB SERPL-MCNC: 1.4 MG/DL (ref 0.2–1.3)
DIFFERENTIAL METHOD BLD: NORMAL
EOSINOPHIL # BLD AUTO: 0.2 10E9/L (ref 0–0.7)
EOSINOPHIL NFR BLD AUTO: 3.2 %
ERYTHROCYTE [DISTWIDTH] IN BLOOD BY AUTOMATED COUNT: 11.9 % (ref 10–15)
HCT VFR BLD AUTO: 47.9 % (ref 40–53)
HGB BLD-MCNC: 16.6 G/DL (ref 13.3–17.7)
IMM GRANULOCYTES # BLD: 0 10E9/L (ref 0–0.4)
IMM GRANULOCYTES NFR BLD: 0.2 %
LYMPHOCYTES # BLD AUTO: 2.2 10E9/L (ref 0.8–5.3)
LYMPHOCYTES NFR BLD AUTO: 41.9 %
MCH RBC QN AUTO: 31.7 PG (ref 26.5–33)
MCHC RBC AUTO-ENTMCNC: 34.7 G/DL (ref 31.5–36.5)
MCV RBC AUTO: 92 FL (ref 78–100)
MONOCYTES # BLD AUTO: 0.4 10E9/L (ref 0–1.3)
MONOCYTES NFR BLD AUTO: 8.1 %
NEUTROPHILS # BLD AUTO: 2.5 10E9/L (ref 1.6–8.3)
NEUTROPHILS NFR BLD AUTO: 46.4 %
NRBC # BLD AUTO: 0 10*3/UL
NRBC BLD AUTO-RTO: 0 /100
PLATELET # BLD AUTO: 288 10E9/L (ref 150–450)
PROT SERPL-MCNC: 7.8 G/DL (ref 6.8–8.8)
RBC # BLD AUTO: 5.23 10E12/L (ref 4.4–5.9)
WBC # BLD AUTO: 5.3 10E9/L (ref 4–11)

## 2021-06-24 PROCEDURE — 80076 HEPATIC FUNCTION PANEL: CPT | Performed by: INTERNAL MEDICINE

## 2021-06-24 PROCEDURE — 85025 COMPLETE CBC W/AUTO DIFF WBC: CPT | Performed by: INTERNAL MEDICINE

## 2021-07-01 ENCOUNTER — APPOINTMENT (OUTPATIENT)
Dept: LAB | Facility: CLINIC | Age: 31
End: 2021-07-01
Attending: INTERNAL MEDICINE
Payer: COMMERCIAL

## 2021-07-01 NOTE — PROGRESS NOTES
This is a recent snapshot of the patient's [facility] medical record.  For current drug dose and complete information and questions, call [phone] or In Basket pool, fv home infusion ([id number])  CSN Number:  [id number]

## 2021-07-02 NOTE — PROGRESS NOTES
This is a recent snapshot of the patient's [facility] medical record.  For current drug dose and complete information and questions, call [phone] or In Basket pool, fv home infusion ([id number])  CSN Number:  [id number]

## 2021-07-20 ENCOUNTER — MEDICAL CORRESPONDENCE (OUTPATIENT)
Dept: HEALTH INFORMATION MANAGEMENT | Facility: CLINIC | Age: 31
End: 2021-07-20

## 2021-08-17 ENCOUNTER — HOME INFUSION (PRE-WILLOW HOME INFUSION) (OUTPATIENT)
Dept: PHARMACY | Facility: CLINIC | Age: 31
End: 2021-08-17

## 2021-08-20 ENCOUNTER — MYC MEDICAL ADVICE (OUTPATIENT)
Dept: INTERNAL MEDICINE | Facility: CLINIC | Age: 31
End: 2021-08-20

## 2021-08-24 ENCOUNTER — HOME INFUSION (PRE-WILLOW HOME INFUSION) (OUTPATIENT)
Dept: PHARMACY | Facility: CLINIC | Age: 31
End: 2021-08-24

## 2021-08-24 NOTE — PROGRESS NOTES
This is a recent snapshot of the patient's [facility] medical record.  For current drug dose and complete information and questions, call [phone] or In Basket pool, fv home infusion ([id number])  CSN Number:  [id number]

## 2021-08-26 ENCOUNTER — LAB REQUISITION (OUTPATIENT)
Dept: LAB | Facility: CLINIC | Age: 31
End: 2021-08-26
Payer: COMMERCIAL

## 2021-08-26 DIAGNOSIS — K50.90 CROHN'S DISEASE, UNSPECIFIED, WITHOUT COMPLICATIONS (H): ICD-10-CM

## 2021-08-26 LAB
ALBUMIN SERPL-MCNC: 3.7 G/DL (ref 3.4–5)
ALP SERPL-CCNC: 56 U/L (ref 40–150)
ALT SERPL W P-5'-P-CCNC: 31 U/L (ref 0–70)
AST SERPL W P-5'-P-CCNC: 19 U/L (ref 0–45)
BASOPHILS # BLD AUTO: 0 10E3/UL (ref 0–0.2)
BASOPHILS NFR BLD AUTO: 1 %
BILIRUB DIRECT SERPL-MCNC: 0.2 MG/DL (ref 0–0.2)
BILIRUB SERPL-MCNC: 1.4 MG/DL (ref 0.2–1.3)
EOSINOPHIL # BLD AUTO: 0.3 10E3/UL (ref 0–0.7)
EOSINOPHIL NFR BLD AUTO: 5 %
ERYTHROCYTE [DISTWIDTH] IN BLOOD BY AUTOMATED COUNT: 11.8 % (ref 10–15)
HCT VFR BLD AUTO: 46.3 % (ref 40–53)
HGB BLD-MCNC: 16.3 G/DL (ref 13.3–17.7)
HOLD SPECIMEN: NORMAL
IMM GRANULOCYTES # BLD: 0 10E3/UL
IMM GRANULOCYTES NFR BLD: 0 %
LYMPHOCYTES # BLD AUTO: 2.5 10E3/UL (ref 0.8–5.3)
LYMPHOCYTES NFR BLD AUTO: 47 %
MCH RBC QN AUTO: 32.1 PG (ref 26.5–33)
MCHC RBC AUTO-ENTMCNC: 35.2 G/DL (ref 31.5–36.5)
MCV RBC AUTO: 91 FL (ref 78–100)
MONOCYTES # BLD AUTO: 0.4 10E3/UL (ref 0–1.3)
MONOCYTES NFR BLD AUTO: 7 %
NEUTROPHILS # BLD AUTO: 2.2 10E3/UL (ref 1.6–8.3)
NEUTROPHILS NFR BLD AUTO: 40 %
NRBC # BLD AUTO: 0 10E3/UL
NRBC BLD AUTO-RTO: 0 /100
PLATELET # BLD AUTO: 278 10E3/UL (ref 150–450)
PROT SERPL-MCNC: 7.7 G/DL (ref 6.8–8.8)
RBC # BLD AUTO: 5.07 10E6/UL (ref 4.4–5.9)
WBC # BLD AUTO: 5.4 10E3/UL (ref 4–11)

## 2021-08-26 PROCEDURE — 80076 HEPATIC FUNCTION PANEL: CPT | Mod: ORL | Performed by: INTERNAL MEDICINE

## 2021-08-26 PROCEDURE — 85025 COMPLETE CBC W/AUTO DIFF WBC: CPT | Mod: ORL | Performed by: INTERNAL MEDICINE

## 2021-08-31 ENCOUNTER — MEDICAL CORRESPONDENCE (OUTPATIENT)
Dept: HEALTH INFORMATION MANAGEMENT | Facility: CLINIC | Age: 31
End: 2021-08-31

## 2021-09-12 ENCOUNTER — HEALTH MAINTENANCE LETTER (OUTPATIENT)
Age: 31
End: 2021-09-12

## 2021-09-20 NOTE — PROGRESS NOTES
This is a recent snapshot of the patient's [facility] medical record.  For current drug dose and complete information and questions, call [phone] or In Basket pool, fv home infusion ([id number])  CSN Number:  [id number]

## 2021-10-19 ENCOUNTER — HOME INFUSION (PRE-WILLOW HOME INFUSION) (OUTPATIENT)
Dept: PHARMACY | Facility: CLINIC | Age: 31
End: 2021-10-19

## 2021-10-20 NOTE — PROGRESS NOTES
This is a recent snapshot of the patient's [facility] medical record.  For current drug dose and complete information and questions, call [phone] or In Basket pool, fv home infusion ([id number])  CSN Number:  [id number]

## 2021-10-21 ENCOUNTER — LAB REQUISITION (OUTPATIENT)
Dept: LAB | Facility: CLINIC | Age: 31
End: 2021-10-21
Payer: COMMERCIAL

## 2021-10-21 DIAGNOSIS — K50.90 CROHN'S DISEASE, UNSPECIFIED, WITHOUT COMPLICATIONS (H): ICD-10-CM

## 2021-10-21 LAB
ALBUMIN SERPL-MCNC: 3.7 G/DL (ref 3.4–5)
ALP SERPL-CCNC: 54 U/L (ref 40–150)
ALT SERPL W P-5'-P-CCNC: 64 U/L (ref 0–70)
AST SERPL W P-5'-P-CCNC: 31 U/L (ref 0–45)
BASOPHILS # BLD MANUAL: 0.1 10E3/UL (ref 0–0.2)
BASOPHILS NFR BLD MANUAL: 1 %
BILIRUB DIRECT SERPL-MCNC: 0.2 MG/DL (ref 0–0.2)
BILIRUB SERPL-MCNC: 1.3 MG/DL (ref 0.2–1.3)
EOSINOPHIL # BLD MANUAL: 0.2 10E3/UL (ref 0–0.7)
EOSINOPHIL NFR BLD MANUAL: 3 %
ERYTHROCYTE [DISTWIDTH] IN BLOOD BY AUTOMATED COUNT: 11.7 % (ref 10–15)
HCT VFR BLD AUTO: 46 % (ref 40–53)
HGB BLD-MCNC: 16.4 G/DL (ref 13.3–17.7)
LYMPHOCYTES # BLD MANUAL: 2.5 10E3/UL (ref 0.8–5.3)
LYMPHOCYTES NFR BLD MANUAL: 43 %
MCH RBC QN AUTO: 32.2 PG (ref 26.5–33)
MCHC RBC AUTO-ENTMCNC: 35.7 G/DL (ref 31.5–36.5)
MCV RBC AUTO: 90 FL (ref 78–100)
MONOCYTES # BLD MANUAL: 0.4 10E3/UL (ref 0–1.3)
MONOCYTES NFR BLD MANUAL: 7 %
NEUTROPHILS # BLD MANUAL: 2.7 10E3/UL (ref 1.6–8.3)
NEUTROPHILS NFR BLD MANUAL: 46 %
PLAT MORPH BLD: NORMAL
PLATELET # BLD AUTO: 260 10E3/UL (ref 150–450)
PROT SERPL-MCNC: 8 G/DL (ref 6.8–8.8)
RBC # BLD AUTO: 5.1 10E6/UL (ref 4.4–5.9)
RBC MORPH BLD: NORMAL
WBC # BLD AUTO: 5.9 10E3/UL (ref 4–11)

## 2021-10-21 PROCEDURE — 80076 HEPATIC FUNCTION PANEL: CPT | Performed by: INTERNAL MEDICINE

## 2021-10-21 PROCEDURE — 85014 HEMATOCRIT: CPT | Performed by: INTERNAL MEDICINE

## 2021-10-22 ENCOUNTER — HOME INFUSION (PRE-WILLOW HOME INFUSION) (OUTPATIENT)
Dept: PHARMACY | Facility: CLINIC | Age: 31
End: 2021-10-22
Payer: COMMERCIAL

## 2021-10-22 DIAGNOSIS — Z53.9 DIAGNOSIS NOT YET DEFINED: Primary | ICD-10-CM

## 2021-11-19 NOTE — PROGRESS NOTES
This is a recent snapshot of the patient's [facility] medical record.  For current drug dose and complete information and questions, call [phone] or In Basket pool, fv home infusion ([id number])  CSN Number:  [id number]

## 2021-12-16 ENCOUNTER — LAB REQUISITION (OUTPATIENT)
Dept: LAB | Facility: CLINIC | Age: 31
End: 2021-12-16
Payer: COMMERCIAL

## 2021-12-16 DIAGNOSIS — K50.90 CROHN'S DISEASE, UNSPECIFIED, WITHOUT COMPLICATIONS (H): ICD-10-CM

## 2021-12-16 LAB
ALBUMIN SERPL-MCNC: 3.9 G/DL (ref 3.4–5)
ALP SERPL-CCNC: 52 U/L (ref 40–150)
ALT SERPL W P-5'-P-CCNC: 29 U/L (ref 0–70)
AST SERPL W P-5'-P-CCNC: 25 U/L (ref 0–45)
BASOPHILS # BLD AUTO: 0 10E3/UL (ref 0–0.2)
BASOPHILS NFR BLD AUTO: 0 %
BILIRUB DIRECT SERPL-MCNC: 0.3 MG/DL (ref 0–0.2)
BILIRUB SERPL-MCNC: 1.8 MG/DL (ref 0.2–1.3)
EOSINOPHIL # BLD AUTO: 0.1 10E3/UL (ref 0–0.7)
EOSINOPHIL NFR BLD AUTO: 2 %
ERYTHROCYTE [DISTWIDTH] IN BLOOD BY AUTOMATED COUNT: 11.6 % (ref 10–15)
HCT VFR BLD AUTO: 45.7 % (ref 40–53)
HGB BLD-MCNC: 16.4 G/DL (ref 13.3–17.7)
HOLD SPECIMEN: NORMAL
IMM GRANULOCYTES # BLD: 0 10E3/UL
IMM GRANULOCYTES NFR BLD: 0 %
LYMPHOCYTES # BLD AUTO: 2.7 10E3/UL (ref 0.8–5.3)
LYMPHOCYTES NFR BLD AUTO: 43 %
MCH RBC QN AUTO: 31.7 PG (ref 26.5–33)
MCHC RBC AUTO-ENTMCNC: 35.9 G/DL (ref 31.5–36.5)
MCV RBC AUTO: 88 FL (ref 78–100)
MONOCYTES # BLD AUTO: 0.4 10E3/UL (ref 0–1.3)
MONOCYTES NFR BLD AUTO: 7 %
NEUTROPHILS # BLD AUTO: 3 10E3/UL (ref 1.6–8.3)
NEUTROPHILS NFR BLD AUTO: 48 %
NRBC # BLD AUTO: 0 10E3/UL
NRBC BLD AUTO-RTO: 0 /100
PLATELET # BLD AUTO: 288 10E3/UL (ref 150–450)
PROT SERPL-MCNC: 8 G/DL (ref 6.8–8.8)
RBC # BLD AUTO: 5.18 10E6/UL (ref 4.4–5.9)
WBC # BLD AUTO: 6.2 10E3/UL (ref 4–11)

## 2021-12-16 PROCEDURE — 85025 COMPLETE CBC W/AUTO DIFF WBC: CPT | Performed by: INTERNAL MEDICINE

## 2021-12-16 PROCEDURE — 80076 HEPATIC FUNCTION PANEL: CPT | Performed by: INTERNAL MEDICINE

## 2021-12-21 DIAGNOSIS — Z53.9 DIAGNOSIS NOT YET DEFINED: Primary | ICD-10-CM

## 2022-01-11 NOTE — PROGRESS NOTES
This is a recent snapshot of the patient's [facility] medical record.  For current drug dose and complete information and questions, call [phone] or In Basket pool, fv home infusion ([id number])  CSN Number:  [id number]

## 2022-02-07 ENCOUNTER — HOME INFUSION (PRE-WILLOW HOME INFUSION) (OUTPATIENT)
Dept: PHARMACY | Facility: CLINIC | Age: 32
End: 2022-02-07

## 2022-02-08 ENCOUNTER — MEDICAL CORRESPONDENCE (OUTPATIENT)
Dept: HEALTH INFORMATION MANAGEMENT | Facility: CLINIC | Age: 32
End: 2022-02-08
Payer: COMMERCIAL

## 2022-02-10 ENCOUNTER — HOME INFUSION (PRE-WILLOW HOME INFUSION) (OUTPATIENT)
Dept: PHARMACY | Facility: CLINIC | Age: 32
End: 2022-02-10

## 2022-02-10 ENCOUNTER — LAB REQUISITION (OUTPATIENT)
Dept: LAB | Facility: CLINIC | Age: 32
End: 2022-02-10
Payer: COMMERCIAL

## 2022-02-10 ENCOUNTER — MYC REFILL (OUTPATIENT)
Dept: INTERNAL MEDICINE | Facility: CLINIC | Age: 32
End: 2022-02-10

## 2022-02-10 DIAGNOSIS — K50.90 CROHN'S DISEASE, UNSPECIFIED, WITHOUT COMPLICATIONS (H): ICD-10-CM

## 2022-02-10 LAB
ALBUMIN SERPL-MCNC: 3.8 G/DL (ref 3.4–5)
ALP SERPL-CCNC: 59 U/L (ref 40–150)
ALT SERPL W P-5'-P-CCNC: 39 U/L (ref 0–70)
AST SERPL W P-5'-P-CCNC: 21 U/L (ref 0–45)
BASOPHILS # BLD AUTO: 0 10E3/UL (ref 0–0.2)
BASOPHILS NFR BLD AUTO: 0 %
BILIRUB DIRECT SERPL-MCNC: 0.2 MG/DL (ref 0–0.2)
BILIRUB SERPL-MCNC: 1.1 MG/DL (ref 0.2–1.3)
EOSINOPHIL # BLD AUTO: 0.2 10E3/UL (ref 0–0.7)
EOSINOPHIL NFR BLD AUTO: 3 %
ERYTHROCYTE [DISTWIDTH] IN BLOOD BY AUTOMATED COUNT: 11.9 % (ref 10–15)
HCT VFR BLD AUTO: 45.8 % (ref 40–53)
HGB BLD-MCNC: 16 G/DL (ref 13.3–17.7)
HOLD SPECIMEN: NORMAL
HOLD SPECIMEN: NORMAL
IMM GRANULOCYTES # BLD: 0 10E3/UL
IMM GRANULOCYTES NFR BLD: 0 %
LYMPHOCYTES # BLD AUTO: 2.3 10E3/UL (ref 0.8–5.3)
LYMPHOCYTES NFR BLD AUTO: 44 %
MCH RBC QN AUTO: 32 PG (ref 26.5–33)
MCHC RBC AUTO-ENTMCNC: 34.9 G/DL (ref 31.5–36.5)
MCV RBC AUTO: 92 FL (ref 78–100)
MONOCYTES # BLD AUTO: 0.3 10E3/UL (ref 0–1.3)
MONOCYTES NFR BLD AUTO: 6 %
NEUTROPHILS # BLD AUTO: 2.4 10E3/UL (ref 1.6–8.3)
NEUTROPHILS NFR BLD AUTO: 47 %
NRBC # BLD AUTO: 0 10E3/UL
NRBC BLD AUTO-RTO: 0 /100
PLATELET # BLD AUTO: 271 10E3/UL (ref 150–450)
PROT SERPL-MCNC: 7.7 G/DL (ref 6.8–8.8)
RBC # BLD AUTO: 5 10E6/UL (ref 4.4–5.9)
WBC # BLD AUTO: 5.3 10E3/UL (ref 4–11)

## 2022-02-10 PROCEDURE — 80076 HEPATIC FUNCTION PANEL: CPT | Performed by: INTERNAL MEDICINE

## 2022-02-10 PROCEDURE — 85025 COMPLETE CBC W/AUTO DIFF WBC: CPT | Performed by: INTERNAL MEDICINE

## 2022-02-11 ENCOUNTER — MEDICAL CORRESPONDENCE (OUTPATIENT)
Dept: HEALTH INFORMATION MANAGEMENT | Facility: CLINIC | Age: 32
End: 2022-02-11
Payer: COMMERCIAL

## 2022-02-11 RX ORDER — INFLIXIMAB 100 MG/10ML
INJECTION, POWDER, LYOPHILIZED, FOR SOLUTION INTRAVENOUS
Status: CANCELLED | OUTPATIENT
Start: 2022-02-11

## 2022-02-11 NOTE — TELEPHONE ENCOUNTER
inFLIXimab (REMICADE) 100 MG injection  100 mg IV every 8 weeks      Last Written Prescription Date:  6/9/20  Last Fill Quantity: n/a,   # refills: n/a  Last Office Visit : 2/7/20  Future Office visit:  none    Routing refill request to provider for review/approval because:  Drug not on the [facility], [facility] or [facility] refill protocol or controlled substance

## 2022-02-15 ENCOUNTER — HOME INFUSION (PRE-WILLOW HOME INFUSION) (OUTPATIENT)
Dept: PHARMACY | Facility: CLINIC | Age: 32
End: 2022-02-15

## 2022-02-27 ENCOUNTER — HEALTH MAINTENANCE LETTER (OUTPATIENT)
Age: 32
End: 2022-02-27

## 2022-03-24 ENCOUNTER — TRANSFERRED RECORDS (OUTPATIENT)
Dept: HEALTH INFORMATION MANAGEMENT | Facility: CLINIC | Age: 32
End: 2022-03-24
Payer: COMMERCIAL

## 2022-03-25 ENCOUNTER — HOME INFUSION (PRE-WILLOW HOME INFUSION) (OUTPATIENT)
Dept: PHARMACY | Facility: CLINIC | Age: 32
End: 2022-03-25
Payer: COMMERCIAL

## 2022-04-05 ENCOUNTER — HOME INFUSION (PRE-WILLOW HOME INFUSION) (OUTPATIENT)
Dept: PHARMACY | Facility: CLINIC | Age: 32
End: 2022-04-05

## 2022-04-06 ENCOUNTER — HOME INFUSION (PRE-WILLOW HOME INFUSION) (OUTPATIENT)
Dept: PHARMACY | Facility: CLINIC | Age: 32
End: 2022-04-06
Payer: COMMERCIAL

## 2022-04-07 ENCOUNTER — LAB REQUISITION (OUTPATIENT)
Dept: LAB | Facility: CLINIC | Age: 32
End: 2022-04-07
Payer: COMMERCIAL

## 2022-04-07 DIAGNOSIS — K50.90 CROHN'S DISEASE, UNSPECIFIED, WITHOUT COMPLICATIONS (H): ICD-10-CM

## 2022-04-07 LAB
ALBUMIN SERPL-MCNC: 3.8 G/DL (ref 3.4–5)
ALP SERPL-CCNC: 53 U/L (ref 40–150)
ALT SERPL W P-5'-P-CCNC: 35 U/L (ref 0–70)
AST SERPL W P-5'-P-CCNC: 16 U/L (ref 0–45)
BASOPHILS # BLD AUTO: 0 10E3/UL (ref 0–0.2)
BASOPHILS NFR BLD AUTO: 0 %
BILIRUB DIRECT SERPL-MCNC: 0.2 MG/DL (ref 0–0.2)
BILIRUB SERPL-MCNC: 1 MG/DL (ref 0.2–1.3)
EOSINOPHIL # BLD AUTO: 0.2 10E3/UL (ref 0–0.7)
EOSINOPHIL NFR BLD AUTO: 3 %
ERYTHROCYTE [DISTWIDTH] IN BLOOD BY AUTOMATED COUNT: 11.8 % (ref 10–15)
HCT VFR BLD AUTO: 44.9 % (ref 40–53)
HGB BLD-MCNC: 16.1 G/DL (ref 13.3–17.7)
HOLD SPECIMEN: NORMAL
IMM GRANULOCYTES # BLD: 0 10E3/UL
IMM GRANULOCYTES NFR BLD: 0 %
LYMPHOCYTES # BLD AUTO: 2.7 10E3/UL (ref 0.8–5.3)
LYMPHOCYTES NFR BLD AUTO: 44 %
MCH RBC QN AUTO: 32.5 PG (ref 26.5–33)
MCHC RBC AUTO-ENTMCNC: 35.9 G/DL (ref 31.5–36.5)
MCV RBC AUTO: 91 FL (ref 78–100)
MONOCYTES # BLD AUTO: 0.4 10E3/UL (ref 0–1.3)
MONOCYTES NFR BLD AUTO: 6 %
NEUTROPHILS # BLD AUTO: 2.8 10E3/UL (ref 1.6–8.3)
NEUTROPHILS NFR BLD AUTO: 47 %
NRBC # BLD AUTO: 0 10E3/UL
NRBC BLD AUTO-RTO: 0 /100
PLATELET # BLD AUTO: 283 10E3/UL (ref 150–450)
PROT SERPL-MCNC: 7.5 G/DL (ref 6.8–8.8)
RBC # BLD AUTO: 4.96 10E6/UL (ref 4.4–5.9)
WBC # BLD AUTO: 6 10E3/UL (ref 4–11)

## 2022-04-07 PROCEDURE — 80076 HEPATIC FUNCTION PANEL: CPT | Performed by: NATUROPATH

## 2022-04-07 PROCEDURE — 85025 COMPLETE CBC W/AUTO DIFF WBC: CPT | Performed by: NATUROPATH

## 2022-04-08 ENCOUNTER — HOME INFUSION (PRE-WILLOW HOME INFUSION) (OUTPATIENT)
Dept: PHARMACY | Facility: CLINIC | Age: 32
End: 2022-04-08

## 2022-04-09 NOTE — PROGRESS NOTES
This is a recent snapshot of the patient's [facility] medical record.  For current drug dose and complete information and questions, call [phone] or In Basket pool, fv home infusion ([id number])  CSN Number:  [id number]

## 2022-04-22 NOTE — PROGRESS NOTES
This is a recent snapshot of the patient's [facility] medical record.  For current drug dose and complete information and questions, call [phone] or In Basket pool, fv home infusion ([id number])  CSN Number:  [id number]

## 2022-05-31 ENCOUNTER — HOME INFUSION (PRE-WILLOW HOME INFUSION) (OUTPATIENT)
Dept: PHARMACY | Facility: CLINIC | Age: 32
End: 2022-05-31
Payer: COMMERCIAL

## 2022-06-01 NOTE — PROGRESS NOTES
This is a recent snapshot of the patient's [facility] medical record.  For current drug dose and complete information and questions, call [phone] or In Basket pool, fv home infusion ([id number])  CSN Number:  [id number]

## 2022-06-22 ENCOUNTER — HOME INFUSION (PRE-WILLOW HOME INFUSION) (OUTPATIENT)
Dept: PHARMACY | Facility: CLINIC | Age: 32
End: 2022-06-22

## 2022-06-23 NOTE — PROGRESS NOTES
This is a recent snapshot of the patient's [facility] medical record.  For current drug dose and complete information and questions, call [phone] or In Basket pool, fv home infusion ([id number])  CSN Number:  [id number]

## 2022-07-05 ENCOUNTER — HOME INFUSION (PRE-WILLOW HOME INFUSION) (OUTPATIENT)
Dept: PHARMACY | Facility: CLINIC | Age: 32
End: 2022-07-05

## 2022-07-07 NOTE — PROGRESS NOTES
This is a recent snapshot of the patient's [facility] medical record.  For current drug dose and complete information and questions, call [phone] or In Basket pool, fv home infusion ([id number])  CSN Number:  [id number]

## 2022-07-20 ASSESSMENT — ENCOUNTER SYMPTOMS
FREQUENCY: 0
MYALGIAS: 0
EYE PAIN: 0
SHORTNESS OF BREATH: 0
DIARRHEA: 0
WEAKNESS: 0
CONSTIPATION: 0
FEVER: 0
NERVOUS/ANXIOUS: 0
CHILLS: 0
SORE THROAT: 0
NAUSEA: 0
ABDOMINAL PAIN: 0
JOINT SWELLING: 0
PALPITATIONS: 0
HEMATURIA: 0
DYSURIA: 0
HEMATOCHEZIA: 0
PARESTHESIAS: 0
COUGH: 0
HEARTBURN: 0
HEADACHES: 0
ARTHRALGIAS: 0
DIZZINESS: 0

## 2022-07-21 ENCOUNTER — OFFICE VISIT (OUTPATIENT)
Dept: FAMILY MEDICINE | Facility: CLINIC | Age: 32
End: 2022-07-21
Payer: COMMERCIAL

## 2022-07-21 VITALS
OXYGEN SATURATION: 99 % | SYSTOLIC BLOOD PRESSURE: 105 MMHG | DIASTOLIC BLOOD PRESSURE: 78 MMHG | HEART RATE: 90 BPM | TEMPERATURE: 99.7 F | HEIGHT: 64 IN | BODY MASS INDEX: 22.77 KG/M2 | WEIGHT: 133.4 LBS

## 2022-07-21 DIAGNOSIS — K50.10 CROHN'S DISEASE OF LARGE INTESTINE WITHOUT COMPLICATION (H): ICD-10-CM

## 2022-07-21 DIAGNOSIS — Z00.00 HEALTHCARE MAINTENANCE: Primary | ICD-10-CM

## 2022-07-21 DIAGNOSIS — L81.9 ATYPICAL PIGMENTED SKIN LESION: ICD-10-CM

## 2022-07-21 DIAGNOSIS — R68.89 EPISODES OF DECREASED ATTENTIVENESS: ICD-10-CM

## 2022-07-21 PROCEDURE — 99385 PREV VISIT NEW AGE 18-39: CPT | Performed by: FAMILY MEDICINE

## 2022-07-21 ASSESSMENT — ENCOUNTER SYMPTOMS
MYALGIAS: 0
FEVER: 0
SHORTNESS OF BREATH: 0
NERVOUS/ANXIOUS: 0
CHILLS: 0
WEAKNESS: 0
ABDOMINAL PAIN: 0
PALPITATIONS: 0
COUGH: 0
HEARTBURN: 0
HEMATURIA: 0
NAUSEA: 0
PARESTHESIAS: 0
FREQUENCY: 0
ARTHRALGIAS: 0
DIZZINESS: 0
SORE THROAT: 0
EYE PAIN: 0
DYSURIA: 0
HEADACHES: 0
DIARRHEA: 0
HEMATOCHEZIA: 0
CONSTIPATION: 0
JOINT SWELLING: 0

## 2022-07-21 NOTE — PROGRESS NOTES
This is a recent snapshot of the patient's [facility] medical record.  For current drug dose and complete information and questions, call [phone] or In Basket pool, fv home infusion ([id number])  CSN Number:  [id number]

## 2022-07-21 NOTE — PROGRESS NOTES
SUBJECTIVE:   CC: Emil Garcia is an 31 year old male who presents for preventative health visit.     Request ADD eval.      Patient has been advised of split billing requirements and indicates understanding: Yes  Healthy Habits:     Getting at least 3 servings of Calcium per day:  Yes    Dental care twice a year:  Yes    Sleep apnea or symptoms of sleep apnea:  None    Diet:  Regular (no restrictions)    Frequency of exercise:  2-3 days/week    Duration of exercise:  15-30 minutes    Taking medications regularly:  Yes    PHQ-2 Total Score: 0              Today's PHQ-2 Score:   PHQ-2 ( 1999 Pfizer) 7/20/2022   Q1: Little interest or pleasure in doing things 0   Q2: Feeling down, depressed or hopeless 0   PHQ-2 Score 0   PHQ-2 Total Score (12-17 Years)- Positive if 3 or more points; Administer PHQ-A if positive -   Q1: Little interest or pleasure in doing things Not at all   Q2: Feeling down, depressed or hopeless Not at all   PHQ-2 Score 0       Abuse: Current or Past(Physical, Sexual or Emotional)- No  Do you feel safe in your environment? Yes    Have you ever done Advance Care Planning? (For example, a Health Directive, POLST, or a discussion with a medical provider or your loved ones about your wishes): No, advance care planning information given to patient to review.  Patient declined advance care planning discussion at this time.    Social History     Tobacco Use     Smoking status: Never Smoker     Smokeless tobacco: Never Used   Substance Use Topics     Alcohol use: No     Alcohol/week:      Types:        Comment: rarely       NEVER SMOKER  NO ALCOHOL USE        Last PSA: No results found for: PSA        Review of Systems   Constitutional: Negative for chills and fever.   HENT: Negative for congestion, ear pain, hearing loss and sore throat.    Eyes: Negative for pain and visual disturbance.   Respiratory: Negative for cough and shortness of breath.    Cardiovascular: Negative for chest pain, palpitations  "and peripheral edema.   Gastrointestinal: Negative for abdominal pain, constipation, diarrhea, heartburn, hematochezia and nausea.   Genitourinary: Negative for dysuria, frequency, genital sores, hematuria, impotence, penile discharge and urgency.   Musculoskeletal: Negative for arthralgias, joint swelling and myalgias.   Skin: Negative for rash.   Neurological: Negative for dizziness, weakness, headaches and paresthesias.   Psychiatric/Behavioral: Negative for mood changes. The patient is not nervous/anxious.        OBJECTIVE:   /78   Pulse 90   Temp 99.7  F (37.6  C) (Oral)   Ht 1.632 m (5' 4.25\")   Wt 60.5 kg (133 lb 6.4 oz)   SpO2 99%   BMI 22.72 kg/m      Wt Readings from Last 4 Encounters:   07/21/22 60.5 kg (133 lb 6.4 oz)   02/07/20 58.5 kg (128 lb 14.4 oz)   01/27/20 60.3 kg (133 lb)   11/18/19 59.2 kg (130 lb 9.6 oz)     Physical Exam  GENERAL: healthy, alert and no distress  EYES: Eyes grossly normal to inspection, PERRL and conjunctivae and sclerae normal  HENT: ear canals and TM's normal, nose and mouth without ulcers or lesions  NECK: no adenopathy, no asymmetry, masses, or scars and thyroid normal to palpation  RESP: lungs clear to auscultation - no rales, rhonchi or wheezes  CV: regular rate and rhythm, normal S1 S2, no S3 or S4, no murmur, click or rub, no peripheral edema and peripheral pulses strong  ABDOMEN: soft, nontender, no hepatosplenomegaly, no masses and bowel sounds normal   (male): deferred  MS: no gross musculoskeletal defects noted, no edema  SKIN: 2 mm left upper chest circular mole, very dark brown but uniform in color with smooth borders  NEURO: Normal strength and tone, mentation intact and speech normal  PSYCH: mentation appears normal, affect normal/bright        ASSESSMENT/PLAN:       ICD-10-CM    1. Healthcare maintenance  Z00.00 CBC with platelets     Comprehensive metabolic panel (BMP + Alb, Alk Phos, ALT, AST, Total. Bili, TP)     Lipid panel reflex to direct " "LDL Fasting     Vitamin D Deficiency   2. Crohn's disease of large intestine without complication (H), follows with [facility] K50.10    3. Atypical pigmented skin lesion,  To see [facility] L81.9 Adult Dermatology Referral   4. Episodes of decreased attentiveness, needs eval R68.89 Adult Mental Health  Referral       PLAN:   Fasting labs (pt will return to have these done).    Dermatology referral    Referral to [facility] for ADD assessment     There are no Patient Instructions on file for this visit.    Patient has been advised of split billing requirements and indicates understanding: Yes    COUNSELING:   Reviewed preventive health counseling, as reflected in patient instructions       Regular exercise       Healthy diet/nutrition    Estimated body mass index is 22.72 kg/m  as calculated from the following:    Height as of this encounter: 1.632 m (5' 4.25\").    Weight as of this encounter: 60.5 kg (133 lb 6.4 oz).     Weight management plan   weight stable.  no concerns with present weight    He reports that he has never smoked. He has never used smokeless tobacco.          Dangelo Aguilar MD  [facility]  "

## 2022-07-25 ENCOUNTER — HOME INFUSION (PRE-WILLOW HOME INFUSION) (OUTPATIENT)
Dept: PHARMACY | Facility: CLINIC | Age: 32
End: 2022-07-25

## 2022-07-25 NOTE — PROGRESS NOTES
This is a recent snapshot of the patient's [facility] medical record.  For current drug dose and complete information and questions, call [phone] or In Basket pool, fv home infusion ([id number])  CSN Number:  [id number]

## 2022-07-26 NOTE — PROGRESS NOTES
This is a recent snapshot of the patient's [facility] medical record.  For current drug dose and complete information and questions, call [phone] or In Basket pool, fv home infusion ([id number])  CSN Number:  [id number]

## 2022-07-28 ENCOUNTER — HOME INFUSION (PRE-WILLOW HOME INFUSION) (OUTPATIENT)
Dept: PHARMACY | Facility: CLINIC | Age: 32
End: 2022-07-28

## 2022-08-01 NOTE — PROGRESS NOTES
This is a recent snapshot of the patient's [facility] medical record.  For current drug dose and complete information and questions, call [phone] or In Basket pool, fv home infusion ([id number])  CSN Number:  [id number]

## 2022-08-02 NOTE — PROGRESS NOTES
This is a recent snapshot of the patient's [facility] medical record.  For current drug dose and complete information and questions, call [phone] or In Basket pool, fv home infusion ([id number])  CSN Number:  [id number]

## 2022-08-08 ENCOUNTER — LAB (OUTPATIENT)
Dept: LAB | Facility: CLINIC | Age: 32
End: 2022-08-08
Payer: COMMERCIAL

## 2022-08-08 DIAGNOSIS — Z00.00 HEALTHCARE MAINTENANCE: ICD-10-CM

## 2022-08-08 LAB
ALBUMIN SERPL BCG-MCNC: 4.5 G/DL (ref 3.5–5.2)
ALP SERPL-CCNC: 54 U/L (ref 40–129)
ALT SERPL W P-5'-P-CCNC: 23 U/L (ref 10–50)
ANION GAP SERPL CALCULATED.3IONS-SCNC: 9 MMOL/L (ref 7–15)
AST SERPL W P-5'-P-CCNC: 21 U/L (ref 10–50)
BILIRUB SERPL-MCNC: 1.2 MG/DL
BUN SERPL-MCNC: 10.9 MG/DL (ref 6–20)
CALCIUM SERPL-MCNC: 9.4 MG/DL (ref 8.6–10)
CHLORIDE SERPL-SCNC: 104 MMOL/L (ref 98–107)
CHOLEST SERPL-MCNC: 172 MG/DL
CREAT SERPL-MCNC: 0.92 MG/DL (ref 0.67–1.17)
DEPRECATED HCO3 PLAS-SCNC: 24 MMOL/L (ref 22–29)
ERYTHROCYTE [DISTWIDTH] IN BLOOD BY AUTOMATED COUNT: 11.6 % (ref 10–15)
GFR SERPL CREATININE-BSD FRML MDRD: >90 ML/MIN/1.73M2
GLUCOSE SERPL-MCNC: 85 MG/DL (ref 70–99)
HCT VFR BLD AUTO: 44.9 % (ref 40–53)
HDLC SERPL-MCNC: 43 MG/DL
HGB BLD-MCNC: 15.7 G/DL (ref 13.3–17.7)
LDLC SERPL CALC-MCNC: 106 MG/DL
MCH RBC QN AUTO: 31.7 PG (ref 26.5–33)
MCHC RBC AUTO-ENTMCNC: 35 G/DL (ref 31.5–36.5)
MCV RBC AUTO: 91 FL (ref 78–100)
NONHDLC SERPL-MCNC: 129 MG/DL
PLATELET # BLD AUTO: 258 10E3/UL (ref 150–450)
POTASSIUM SERPL-SCNC: 3.8 MMOL/L (ref 3.4–5.3)
PROT SERPL-MCNC: 7.1 G/DL (ref 6.4–8.3)
RBC # BLD AUTO: 4.95 10E6/UL (ref 4.4–5.9)
SODIUM SERPL-SCNC: 137 MMOL/L (ref 136–145)
TRIGL SERPL-MCNC: 116 MG/DL
WBC # BLD AUTO: 6.7 10E3/UL (ref 4–11)

## 2022-08-08 PROCEDURE — 85027 COMPLETE CBC AUTOMATED: CPT

## 2022-08-08 PROCEDURE — 80053 COMPREHEN METABOLIC PANEL: CPT

## 2022-08-08 PROCEDURE — 36415 COLL VENOUS BLD VENIPUNCTURE: CPT

## 2022-08-08 PROCEDURE — 82306 VITAMIN D 25 HYDROXY: CPT

## 2022-08-08 PROCEDURE — 80061 LIPID PANEL: CPT

## 2022-08-09 LAB — DEPRECATED CALCIDIOL+CALCIFEROL SERPL-MC: 71 UG/L (ref 20–75)

## 2022-09-20 ENCOUNTER — HOME INFUSION (PRE-WILLOW HOME INFUSION) (OUTPATIENT)
Dept: PHARMACY | Facility: CLINIC | Age: 32
End: 2022-09-20

## 2022-09-22 ENCOUNTER — DOCUMENTATION ONLY (OUTPATIENT)
Dept: PHARMACY | Facility: CLINIC | Age: 32
End: 2022-09-22

## 2022-09-22 ENCOUNTER — LAB REQUISITION (OUTPATIENT)
Dept: LAB | Facility: CLINIC | Age: 32
End: 2022-09-22

## 2022-09-22 ENCOUNTER — HOME INFUSION (PRE-WILLOW HOME INFUSION) (OUTPATIENT)
Dept: PHARMACY | Facility: CLINIC | Age: 32
End: 2022-09-22

## 2022-09-22 DIAGNOSIS — K50.90 CROHN'S DISEASE, UNSPECIFIED, WITHOUT COMPLICATIONS (H): ICD-10-CM

## 2022-09-22 LAB
ALBUMIN SERPL BCG-MCNC: 4.6 G/DL (ref 3.5–5.2)
ALP SERPL-CCNC: 62 U/L (ref 40–129)
ALT SERPL W P-5'-P-CCNC: 48 U/L (ref 10–50)
AST SERPL W P-5'-P-CCNC: 33 U/L (ref 10–50)
BASOPHILS # BLD AUTO: 0 10E3/UL (ref 0–0.2)
BASOPHILS NFR BLD AUTO: 0 %
BILIRUB DIRECT SERPL-MCNC: <0.2 MG/DL (ref 0–0.3)
BILIRUB SERPL-MCNC: 1.1 MG/DL
EOSINOPHIL # BLD AUTO: 0.2 10E3/UL (ref 0–0.7)
EOSINOPHIL NFR BLD AUTO: 4 %
ERYTHROCYTE [DISTWIDTH] IN BLOOD BY AUTOMATED COUNT: 11.7 % (ref 10–15)
HCT VFR BLD AUTO: 47.7 % (ref 40–53)
HGB BLD-MCNC: 17 G/DL (ref 13.3–17.7)
IMM GRANULOCYTES # BLD: 0 10E3/UL
IMM GRANULOCYTES NFR BLD: 0 %
LYMPHOCYTES # BLD AUTO: 2.6 10E3/UL (ref 0.8–5.3)
LYMPHOCYTES NFR BLD AUTO: 45 %
MCH RBC QN AUTO: 31.9 PG (ref 26.5–33)
MCHC RBC AUTO-ENTMCNC: 35.6 G/DL (ref 31.5–36.5)
MCV RBC AUTO: 90 FL (ref 78–100)
MONOCYTES # BLD AUTO: 0.4 10E3/UL (ref 0–1.3)
MONOCYTES NFR BLD AUTO: 7 %
NEUTROPHILS # BLD AUTO: 2.6 10E3/UL (ref 1.6–8.3)
NEUTROPHILS NFR BLD AUTO: 44 %
NRBC # BLD AUTO: 0 10E3/UL
NRBC BLD AUTO-RTO: 0 /100
PLATELET # BLD AUTO: 282 10E3/UL (ref 150–450)
PROT SERPL-MCNC: 8.1 G/DL (ref 6.4–8.3)
RBC # BLD AUTO: 5.33 10E6/UL (ref 4.4–5.9)
WBC # BLD AUTO: 5.9 10E3/UL (ref 4–11)

## 2022-09-22 PROCEDURE — 80076 HEPATIC FUNCTION PANEL: CPT | Performed by: INTERNAL MEDICINE

## 2022-09-22 PROCEDURE — 85025 COMPLETE CBC W/AUTO DIFF WBC: CPT | Performed by: INTERNAL MEDICINE

## 2022-09-22 NOTE — PROGRESS NOTES
This is a recent snapshot of the patient's [facility] medical record.  For current drug dose and complete information and questions, call [phone] or In Basket pool, fv home infusion ([id number])  CSN Number:  [id number]

## 2022-09-23 ENCOUNTER — HOME INFUSION (PRE-WILLOW HOME INFUSION) (OUTPATIENT)
Dept: PHARMACY | Facility: CLINIC | Age: 32
End: 2022-09-23

## 2022-09-23 NOTE — PROGRESS NOTES
Skilled Nurse visit in the home to administer Remicade.  No recent elevated temperature, fever, chills, productive cough, coughing for 3 weeks or longer or hemoptysis, abnormal vital signs, night sweats, chest pain. No  decrease in your appetite, unexplained weight loss or fatigue.  No other new onset medical symptoms.  .PIV to left AC with 1 attempt. Pre medicated with NA. Labs drawn CBC and LFTs Infusion completed without} complication or reaction. Pt reports therapy is effective  in managing symptoms related to therapy.      Jordana Dasilva RN, BSN, PHN  [facility]  Work Cell [phone]  Work Email [email or web address]

## 2022-09-27 NOTE — PROGRESS NOTES
This is a recent snapshot of the patient's [facility] medical record.  For current drug dose and complete information and questions, call [phone] or In Basket pool, fv home infusion ([id number])  CSN Number:  [id number]

## 2022-09-27 NOTE — PROGRESS NOTES
This is a recent snapshot of the patient's [facility] medical record.  For current drug dose and complete information and questions, call [phone] or In Basket pool, fv home infusion ([id number])  CSN Number:  [id number]

## 2022-09-28 ENCOUNTER — HOME INFUSION (PRE-WILLOW HOME INFUSION) (OUTPATIENT)
Dept: PHARMACY | Facility: CLINIC | Age: 32
End: 2022-09-28

## 2022-09-29 NOTE — PROGRESS NOTES
This is a recent snapshot of the patient's [facility] medical record.  For current drug dose and complete information and questions, call [phone] or In Basket pool, fv home infusion ([id number])  CSN Number:  [id number]

## 2022-10-05 ENCOUNTER — HOME INFUSION (PRE-WILLOW HOME INFUSION) (OUTPATIENT)
Dept: PHARMACY | Facility: CLINIC | Age: 32
End: 2022-10-05

## 2022-10-24 NOTE — PROGRESS NOTES
This is a recent snapshot of the patient's [facility] medical record.  For current drug dose and complete information and questions, call [phone] or In Basket pool, fv home infusion ([id number])  CSN Number:  [id number]

## 2022-11-19 ENCOUNTER — HEALTH MAINTENANCE LETTER (OUTPATIENT)
Age: 32
End: 2022-11-19

## 2022-11-22 NOTE — PROGRESS NOTES
This is a recent snapshot of the patient's [facility] medical record.  For current drug dose and complete information and questions, call [phone] or In Basket pool, fv home infusion ([id number])  CSN Number:  [id number]

## 2022-12-01 NOTE — PROGRESS NOTES
This is a recent snapshot of the patient's [facility] medical record.  For current drug dose and complete information and questions, call [phone] or In Basket pool, fv home infusion ([id number])  CSN Number:  [id number]

## 2022-12-13 ENCOUNTER — HOME INFUSION (PRE-WILLOW HOME INFUSION) (OUTPATIENT)
Dept: PHARMACY | Facility: CLINIC | Age: 32
End: 2022-12-13

## 2023-03-09 ENCOUNTER — LAB REQUISITION (OUTPATIENT)
Dept: LAB | Facility: CLINIC | Age: 33
End: 2023-03-09
Payer: COMMERCIAL

## 2023-03-09 DIAGNOSIS — K50.90 CROHN'S DISEASE, UNSPECIFIED, WITHOUT COMPLICATIONS (H): ICD-10-CM

## 2023-03-09 LAB
ALBUMIN SERPL BCG-MCNC: 4.4 G/DL (ref 3.5–5.2)
ALP SERPL-CCNC: 52 U/L (ref 40–129)
ALT SERPL W P-5'-P-CCNC: 26 U/L (ref 10–50)
AST SERPL W P-5'-P-CCNC: 23 U/L (ref 10–50)
BASOPHILS # BLD AUTO: 0 10E3/UL (ref 0–0.2)
BASOPHILS NFR BLD AUTO: 0 %
BILIRUB DIRECT SERPL-MCNC: <0.2 MG/DL (ref 0–0.3)
BILIRUB SERPL-MCNC: 1.3 MG/DL
EOSINOPHIL # BLD AUTO: 0.1 10E3/UL (ref 0–0.7)
EOSINOPHIL NFR BLD AUTO: 2 %
ERYTHROCYTE [DISTWIDTH] IN BLOOD BY AUTOMATED COUNT: 11.5 % (ref 10–15)
HCT VFR BLD AUTO: 45.4 % (ref 40–53)
HGB BLD-MCNC: 16.2 G/DL (ref 13.3–17.7)
HOLD SPECIMEN: NORMAL
IMM GRANULOCYTES # BLD: 0 10E3/UL
IMM GRANULOCYTES NFR BLD: 0 %
LYMPHOCYTES # BLD AUTO: 2.3 10E3/UL (ref 0.8–5.3)
LYMPHOCYTES NFR BLD AUTO: 46 %
MCH RBC QN AUTO: 31.8 PG (ref 26.5–33)
MCHC RBC AUTO-ENTMCNC: 35.7 G/DL (ref 31.5–36.5)
MCV RBC AUTO: 89 FL (ref 78–100)
MONOCYTES # BLD AUTO: 0.4 10E3/UL (ref 0–1.3)
MONOCYTES NFR BLD AUTO: 8 %
NEUTROPHILS # BLD AUTO: 2.3 10E3/UL (ref 1.6–8.3)
NEUTROPHILS NFR BLD AUTO: 44 %
NRBC # BLD AUTO: 0 10E3/UL
NRBC BLD AUTO-RTO: 0 /100
PLATELET # BLD AUTO: 262 10E3/UL (ref 150–450)
PROT SERPL-MCNC: 7.6 G/DL (ref 6.4–8.3)
RBC # BLD AUTO: 5.09 10E6/UL (ref 4.4–5.9)
WBC # BLD AUTO: 5.1 10E3/UL (ref 4–11)

## 2023-03-09 PROCEDURE — 80076 HEPATIC FUNCTION PANEL: CPT

## 2023-03-09 PROCEDURE — 85025 COMPLETE CBC W/AUTO DIFF WBC: CPT

## 2023-03-20 ENCOUNTER — TRANSFERRED RECORDS (OUTPATIENT)
Dept: HEALTH INFORMATION MANAGEMENT | Facility: CLINIC | Age: 33
End: 2023-03-20
Payer: COMMERCIAL

## 2023-08-24 ENCOUNTER — LAB REQUISITION (OUTPATIENT)
Dept: LAB | Facility: CLINIC | Age: 33
End: 2023-08-24
Payer: COMMERCIAL

## 2023-08-24 DIAGNOSIS — K50.90 CROHN'S DISEASE, UNSPECIFIED, WITHOUT COMPLICATIONS (H): ICD-10-CM

## 2023-08-24 LAB
ALBUMIN SERPL BCG-MCNC: 4 G/DL (ref 3.5–5.2)
ALP SERPL-CCNC: 67 U/L (ref 40–129)
ALT SERPL W P-5'-P-CCNC: 88 U/L (ref 0–70)
AST SERPL W P-5'-P-CCNC: 41 U/L (ref 0–45)
BASOPHILS # BLD AUTO: 0 10E3/UL (ref 0–0.2)
BASOPHILS NFR BLD AUTO: 1 %
BILIRUB DIRECT SERPL-MCNC: <0.2 MG/DL (ref 0–0.3)
BILIRUB SERPL-MCNC: 1.1 MG/DL
EOSINOPHIL # BLD AUTO: 0.2 10E3/UL (ref 0–0.7)
EOSINOPHIL NFR BLD AUTO: 3 %
ERYTHROCYTE [DISTWIDTH] IN BLOOD BY AUTOMATED COUNT: 11.9 % (ref 10–15)
HCT VFR BLD AUTO: 46.1 % (ref 40–53)
HGB BLD-MCNC: 16.4 G/DL (ref 13.3–17.7)
IMM GRANULOCYTES # BLD: 0 10E3/UL
IMM GRANULOCYTES NFR BLD: 0 %
LYMPHOCYTES # BLD AUTO: 2.7 10E3/UL (ref 0.8–5.3)
LYMPHOCYTES NFR BLD AUTO: 42 %
MCH RBC QN AUTO: 32.3 PG (ref 26.5–33)
MCHC RBC AUTO-ENTMCNC: 35.6 G/DL (ref 31.5–36.5)
MCV RBC AUTO: 91 FL (ref 78–100)
MONOCYTES # BLD AUTO: 0.5 10E3/UL (ref 0–1.3)
MONOCYTES NFR BLD AUTO: 8 %
NEUTROPHILS # BLD AUTO: 3 10E3/UL (ref 1.6–8.3)
NEUTROPHILS NFR BLD AUTO: 46 %
NRBC # BLD AUTO: 0 10E3/UL
NRBC BLD AUTO-RTO: 0 /100
PLATELET # BLD AUTO: 284 10E3/UL (ref 150–450)
PROT SERPL-MCNC: 7.1 G/DL (ref 6.4–8.3)
RBC # BLD AUTO: 5.08 10E6/UL (ref 4.4–5.9)
WBC # BLD AUTO: 6.5 10E3/UL (ref 4–11)

## 2023-08-24 PROCEDURE — 80076 HEPATIC FUNCTION PANEL: CPT | Performed by: INTERNAL MEDICINE

## 2023-08-24 PROCEDURE — 85025 COMPLETE CBC W/AUTO DIFF WBC: CPT | Performed by: INTERNAL MEDICINE

## 2023-09-09 ENCOUNTER — HEALTH MAINTENANCE LETTER (OUTPATIENT)
Age: 33
End: 2023-09-09

## 2023-10-19 ENCOUNTER — DOCUMENTATION ONLY (OUTPATIENT)
Dept: PHARMACY | Facility: CLINIC | Age: 33
End: 2023-10-19
Payer: COMMERCIAL

## 2023-11-06 ASSESSMENT — ENCOUNTER SYMPTOMS
JOINT SWELLING: 0
WEAKNESS: 0
HEARTBURN: 0
HEADACHES: 0
EYE PAIN: 0
FREQUENCY: 0
COUGH: 1
FEVER: 0
PALPITATIONS: 0
ARTHRALGIAS: 0
HEMATURIA: 0
NAUSEA: 0
DYSURIA: 0
CHILLS: 0
SORE THROAT: 0
SHORTNESS OF BREATH: 0
PARESTHESIAS: 0
NERVOUS/ANXIOUS: 0
CONSTIPATION: 0
DIARRHEA: 0
DIZZINESS: 0
ABDOMINAL PAIN: 0
HEMATOCHEZIA: 0
MYALGIAS: 0

## 2023-11-09 ENCOUNTER — OFFICE VISIT (OUTPATIENT)
Dept: FAMILY MEDICINE | Facility: CLINIC | Age: 33
End: 2023-11-09
Payer: COMMERCIAL

## 2023-11-09 VITALS
DIASTOLIC BLOOD PRESSURE: 83 MMHG | WEIGHT: 152.6 LBS | HEART RATE: 97 BPM | OXYGEN SATURATION: 97 % | BODY MASS INDEX: 25.43 KG/M2 | RESPIRATION RATE: 18 BRPM | SYSTOLIC BLOOD PRESSURE: 129 MMHG | HEIGHT: 65 IN

## 2023-11-09 DIAGNOSIS — Z00.00 HEALTHCARE MAINTENANCE: ICD-10-CM

## 2023-11-09 DIAGNOSIS — Z11.4 SCREENING FOR HIV (HUMAN IMMUNODEFICIENCY VIRUS): ICD-10-CM

## 2023-11-09 DIAGNOSIS — Z23 NEED FOR HPV VACCINE: ICD-10-CM

## 2023-11-09 DIAGNOSIS — K50.10 CROHN'S DISEASE OF LARGE INTESTINE WITHOUT COMPLICATION (H): ICD-10-CM

## 2023-11-09 DIAGNOSIS — Z12.83 SKIN CANCER SCREENING: ICD-10-CM

## 2023-11-09 DIAGNOSIS — Z23 NEED FOR VACCINATION FOR STREP PNEUMONIAE: ICD-10-CM

## 2023-11-09 PROCEDURE — 90472 IMMUNIZATION ADMIN EACH ADD: CPT | Performed by: FAMILY MEDICINE

## 2023-11-09 PROCEDURE — 90471 IMMUNIZATION ADMIN: CPT | Performed by: FAMILY MEDICINE

## 2023-11-09 PROCEDURE — 90677 PCV20 VACCINE IM: CPT | Performed by: FAMILY MEDICINE

## 2023-11-09 PROCEDURE — 99395 PREV VISIT EST AGE 18-39: CPT | Mod: 25 | Performed by: FAMILY MEDICINE

## 2023-11-09 PROCEDURE — 90651 9VHPV VACCINE 2/3 DOSE IM: CPT | Performed by: FAMILY MEDICINE

## 2023-11-09 PROCEDURE — 99213 OFFICE O/P EST LOW 20 MIN: CPT | Mod: 25 | Performed by: FAMILY MEDICINE

## 2023-11-09 ASSESSMENT — ENCOUNTER SYMPTOMS
ARTHRALGIAS: 0
FEVER: 0
NAUSEA: 0
HEMATOCHEZIA: 0
HEMATURIA: 0
DYSURIA: 0
COUGH: 1
FREQUENCY: 0
HEADACHES: 0
CHILLS: 0
WEAKNESS: 0
SHORTNESS OF BREATH: 0
SORE THROAT: 0
NERVOUS/ANXIOUS: 0
PARESTHESIAS: 0
JOINT SWELLING: 0
CONSTIPATION: 0
HEARTBURN: 0
EYE PAIN: 0
PALPITATIONS: 0
DIZZINESS: 0
ABDOMINAL PAIN: 0
DIARRHEA: 0
MYALGIAS: 0

## 2023-11-09 NOTE — PATIENT INSTRUCTIONS
Follow up for fasting labs    HPV # 3 today    Prevnar 20 booster today    Monthly self testicular exams.    See Dermatology  (Katya Doss)

## 2023-11-09 NOTE — PROGRESS NOTES
"SUBJECTIVE:   CC: Mckinley is an 33 year old who presents for preventative health visit.       11/9/2023     3:26 PM   Additional Questions   Roomed by Windy Beltran CMA       Healthy Habits:     Getting at least 3 servings of Calcium per day:  Yes    Bi-annual eye exam:  NO    Dental care twice a year:  Yes    Sleep apnea or symptoms of sleep apnea:  None    Diet:  Regular (no restrictions)    Frequency of exercise:  4-5 days/week    Duration of exercise:  30-45 minutes    Taking medications regularly:  Yes    Barriers to taking medications:  None    Medication side effects:  None    Additional concerns today:  No      Today's PHQ-2 Score:       11/9/2023     3:23 PM   PHQ-2 ( 1999 Pfizer)   Q1: Little interest or pleasure in doing things 0   Q2: Feeling down, depressed or hopeless 0   PHQ-2 Score 0   Q1: Little interest or pleasure in doing things Not at all   Q2: Feeling down, depressed or hopeless Not at all   PHQ-2 Score 0         Social History     Tobacco Use    Smoking status: Never    Smokeless tobacco: Never   Substance Use Topics    Alcohol use: Yes     Alcohol/week: 1.0 - 2.0 standard drink of alcohol     Types: 1 - 2 Cans of beer per week     Comment: rarely     No longer drinks alcohol  No long        11/6/2023     3:47 PM   Alcohol Use   Prescreen: >3 drinks/day or >7 drinks/week? No          No data to display                Last PSA: No results found for: \"PSA\"    Reviewed orders with patient. Reviewed health maintenance and updated orders accordingly - Yes      Reviewed and updated as needed this visit by clinical staff   Tobacco  Allergies  Meds              Reviewed and updated as needed this visit by Provider                     Review of Systems   Constitutional:  Negative for chills and fever.   HENT:  Positive for congestion. Negative for ear pain, hearing loss and sore throat.    Eyes:  Negative for pain and visual disturbance.   Respiratory:  Positive for cough. Negative for shortness of " "breath.    Cardiovascular:  Negative for chest pain, palpitations and peripheral edema.   Gastrointestinal:  Negative for abdominal pain, constipation, diarrhea, heartburn, hematochezia and nausea.   Genitourinary:  Negative for dysuria, frequency, genital sores, hematuria, impotence, penile discharge and urgency.   Musculoskeletal:  Negative for arthralgias, joint swelling and myalgias.   Skin:  Negative for rash.   Neurological:  Negative for dizziness, weakness, headaches and paresthesias.   Psychiatric/Behavioral:  Negative for mood changes. The patient is not nervous/anxious.      Minor cough    OBJECTIVE:   /83 (BP Location: Left arm, Patient Position: Sitting, Cuff Size: Adult Regular)   Pulse 97   Resp 18   Ht 1.657 m (5' 5.25\")   Wt 69.2 kg (152 lb 9.6 oz)   SpO2 97%   BMI 25.20 kg/m      Physical Exam  GENERAL: healthy, alert and no distress  EYES: Eyes grossly normal to inspection, PERRL and conjunctivae and sclerae normal  HENT: ear canals and TM's normal, nose and mouth without ulcers or lesions  NECK: no adenopathy, no asymmetry, masses, or scars and thyroid normal to palpation  RESP: lungs clear to auscultation - no rales, rhonchi or wheezes  CV: regular rate and rhythm, normal S1 S2, no S3 or S4, no murmur, click or rub, no peripheral edema and peripheral pulses strong  ABDOMEN: soft, nontender, no hepatosplenomegaly, no masses and bowel sounds normal   exam:  declined  MS: no gross musculoskeletal defects noted, no edema  SKIN: dark 2-3 mm well circumscribed mole on the left upper anterior chest  NEURO: Normal strength and tone, mentation intact and speech normal  PSYCH: mentation appears normal, affect normal/bright        ASSESSMENT/PLAN:       ICD-10-CM    1. Healthcare maintenance  Z00.00 REVIEW OF HEALTH MAINTENANCE PROTOCOL ORDERS     Comprehensive metabolic panel (BMP + Alb, Alk Phos, ALT, AST, Total. Bili, TP)     Lipid panel reflex to direct LDL Fasting     Vitamin D " "Deficiency      2. Crohn's disease of large intestine without complication (H), stable on Remicade K50.10       3. Screening for HIV (human immunodeficiency virus)  Z11.4 HIV Antigen Antibody Combo      4. Need for HPV vaccine, 3rd dose given Z23 HPV9 (GARDASIL 9)      5. Need for vaccination for Strep pneumoniae, Prevnar 20 given Z23 Pneumococcal 20 Valent Conjugate (Prevnar 20)      6. Skin cancer screening  Z12.83 Adult Dermatology  Referral          PLAN:        Follow up for fasting labs    HPV # 3 today    Prevnar 20 booster today    Monthly self testicular exams.    See Dermatology  ([facility])     Patient has been advised of split billing requirements and indicates understanding: Yes      COUNSELING:   Reviewed preventive health counseling, as reflected in patient instructions       Regular exercise       Healthy diet/nutrition      BMI:   Estimated body mass index is 25.2 kg/m  as calculated from the following:    Height as of this encounter: 1.657 m (5' 5.25\").    Weight as of this encounter: 69.2 kg (152 lb 9.6 oz).         He reports that he has never smoked. He has never used smokeless tobacco.      Dangelo Aguilar MD  [facility]  "

## 2023-11-17 ENCOUNTER — LAB (OUTPATIENT)
Dept: LAB | Facility: CLINIC | Age: 33
End: 2023-11-17
Payer: COMMERCIAL

## 2023-11-17 DIAGNOSIS — Z00.00 HEALTHCARE MAINTENANCE: ICD-10-CM

## 2023-11-17 DIAGNOSIS — Z11.4 SCREENING FOR HIV (HUMAN IMMUNODEFICIENCY VIRUS): ICD-10-CM

## 2023-11-17 LAB
ALBUMIN SERPL BCG-MCNC: 4.2 G/DL (ref 3.5–5.2)
ALP SERPL-CCNC: 61 U/L (ref 40–150)
ALT SERPL W P-5'-P-CCNC: 106 U/L (ref 0–70)
ANION GAP SERPL CALCULATED.3IONS-SCNC: 12 MMOL/L (ref 7–15)
AST SERPL W P-5'-P-CCNC: 35 U/L (ref 0–45)
BILIRUB SERPL-MCNC: 1.1 MG/DL
BUN SERPL-MCNC: 14.3 MG/DL (ref 6–20)
CALCIUM SERPL-MCNC: 9.8 MG/DL (ref 8.6–10)
CHLORIDE SERPL-SCNC: 104 MMOL/L (ref 98–107)
CHOLEST SERPL-MCNC: 200 MG/DL
CREAT SERPL-MCNC: 0.97 MG/DL (ref 0.67–1.17)
DEPRECATED HCO3 PLAS-SCNC: 23 MMOL/L (ref 22–29)
EGFRCR SERPLBLD CKD-EPI 2021: >90 ML/MIN/1.73M2
GLUCOSE SERPL-MCNC: 95 MG/DL (ref 70–99)
HDLC SERPL-MCNC: 38 MG/DL
LDLC SERPL CALC-MCNC: 118 MG/DL
NONHDLC SERPL-MCNC: 162 MG/DL
POTASSIUM SERPL-SCNC: 4.2 MMOL/L (ref 3.4–5.3)
PROT SERPL-MCNC: 7.7 G/DL (ref 6.4–8.3)
SODIUM SERPL-SCNC: 139 MMOL/L (ref 135–145)
TRIGL SERPL-MCNC: 220 MG/DL
VIT D+METAB SERPL-MCNC: 37 NG/ML (ref 20–50)

## 2023-11-17 PROCEDURE — 87389 HIV-1 AG W/HIV-1&-2 AB AG IA: CPT

## 2023-11-17 PROCEDURE — 80061 LIPID PANEL: CPT

## 2023-11-17 PROCEDURE — 36415 COLL VENOUS BLD VENIPUNCTURE: CPT

## 2023-11-17 PROCEDURE — 82306 VITAMIN D 25 HYDROXY: CPT

## 2023-11-17 PROCEDURE — 80053 COMPREHEN METABOLIC PANEL: CPT

## 2023-11-18 ENCOUNTER — HEALTH MAINTENANCE LETTER (OUTPATIENT)
Age: 33
End: 2023-11-18

## 2023-11-20 ENCOUNTER — TRANSFERRED RECORDS (OUTPATIENT)
Dept: FAMILY MEDICINE | Facility: CLINIC | Age: 33
End: 2023-11-20
Payer: COMMERCIAL

## 2023-11-20 LAB — HIV 1+2 AB+HIV1 P24 AG SERPL QL IA: NONREACTIVE

## 2023-11-23 ENCOUNTER — MYC MEDICAL ADVICE (OUTPATIENT)
Dept: FAMILY MEDICINE | Facility: CLINIC | Age: 33
End: 2023-11-23
Payer: COMMERCIAL

## 2023-11-23 DIAGNOSIS — R74.8 ELEVATED LIVER ENZYMES: Primary | ICD-10-CM

## 2023-11-28 ENCOUNTER — LAB (OUTPATIENT)
Dept: LAB | Facility: CLINIC | Age: 33
End: 2023-11-28
Payer: COMMERCIAL

## 2023-11-28 DIAGNOSIS — R74.8 ELEVATED LIVER ENZYMES: ICD-10-CM

## 2023-11-28 PROCEDURE — 80076 HEPATIC FUNCTION PANEL: CPT

## 2023-11-28 PROCEDURE — 36415 COLL VENOUS BLD VENIPUNCTURE: CPT

## 2023-11-29 LAB
ALBUMIN SERPL BCG-MCNC: 4.4 G/DL (ref 3.5–5.2)
ALP SERPL-CCNC: 65 U/L (ref 40–150)
ALT SERPL W P-5'-P-CCNC: 68 U/L (ref 0–70)
AST SERPL W P-5'-P-CCNC: 34 U/L (ref 0–45)
BILIRUB DIRECT SERPL-MCNC: 0.21 MG/DL (ref 0–0.3)
BILIRUB SERPL-MCNC: 1.1 MG/DL
PROT SERPL-MCNC: 7.9 G/DL (ref 6.4–8.3)

## 2023-11-30 ENCOUNTER — MYC MEDICAL ADVICE (OUTPATIENT)
Dept: FAMILY MEDICINE | Facility: CLINIC | Age: 33
End: 2023-11-30
Payer: COMMERCIAL

## 2023-12-01 ENCOUNTER — NURSE TRIAGE (OUTPATIENT)
Dept: FAMILY MEDICINE | Facility: CLINIC | Age: 33
End: 2023-12-01
Payer: COMMERCIAL

## 2023-12-01 ENCOUNTER — TELEPHONE (OUTPATIENT)
Dept: FAMILY MEDICINE | Facility: CLINIC | Age: 33
End: 2023-12-01
Payer: COMMERCIAL

## 2023-12-01 DIAGNOSIS — U07.1 INFECTION DUE TO 2019 NOVEL CORONAVIRUS: Primary | ICD-10-CM

## 2023-12-01 NOTE — TELEPHONE ENCOUNTER
Patient calling in to see if prescription for Paxlovid from 12/01/2023 was sent in. Confirmed with patient the address for requested pharmacy and that the pharmacy received the receipt for this Rx at 0944 today. Patient is going to call [facility] again and try to clarify.     Blayne Simental RN

## 2023-12-01 NOTE — TELEPHONE ENCOUNTER
COVID Positive/Requesting COVID treatment    Patient is positive for COVID and requesting treatment options.    Date of positive COVID test (PCR or at home)? 11/30/23  Current COVID symptoms: fever or chills, cough, fatigue, muscle or body aches, headache, sore throat, congestion or runny nose, and nausea or vomiting, pt states no vomiting, just feeling nauseous and dizzy  Date COVID symptoms began: 11/27/23    Message should be routed to clinic RN pool. Best phone number to use for call back: [phone]. Pt states that he also submitted a my chart message, would prefer a phone call response instead of a my chart response

## 2023-12-01 NOTE — TELEPHONE ENCOUNTER
RN COVID TREATMENT VISIT  12/01/23      The patient has been triaged and does not require a higher level of care.    Emil Garcia  33 year old  Current weight? 152 lbs     Has the patient been seen by a primary care provider at an [facility] or [facility] within the past two years? Yes.   Have you been in close proximity to/do you have a known exposure to a person with a confirmed case of influenza? No.     General treatment eligibility:  Date of positive COVID test (PCR or at home)?  11/30/2023    Are you or have you been hospitalized for this COVID-19 infection? No.   Have you received monoclonal antibodies or antiviral treatment for COVID-19 since this positive test? No.   Do you have any of the following conditions that place you at risk of being very sick from COVID-19?   - Overweight or Obesity (BMI >85th percentile or BMI 25 or higher)  Yes, patient has at least one high risk condition as noted above.     Current COVID symptoms:   - fever or chills  - cough  - muscle or body aches  - congestion or runny nose  Yes. Patient has at least one symptom as selected.     How many days since symptoms started? 5 days or less. Established patient, 12 years or older weighing at least 88.2 lbs, who has symptoms that started in the past 5 days, has not been hospitalized nor received treatment already, and is at risk for being very sick from COVID-19.     Treatment eligibility by RN:  Are you currently pregnant or nursing? No  Do you have a clinically significant hypersensitivity to nirmatrelvir or ritonavir, or toxic epidermal necrolysis (TEN) or Castillo-Michael Syndrome? No  Do you have a history of hepatitis, any hepatic impairment on the Problem List (such as Child-Barragan Class C, cirrhosis, fatty liver disease, alcoholic liver disease), or was the last liver lab (hepatic panel, ALT, AST, ALK Phos, bilirubin) elevated in the past 6 months? No  Do you have any history of severe renal  impairment (eGFR < 30mL/min)? No    Is patient eligible to continue? Yes, patient meets all eligibility requirements for the RN COVID treatment (as denoted by all no responses above).     Current Outpatient Medications   Medication Sig Dispense Refill    Cholecalciferol (VITAMIN D PO) Take by mouth daily      inFLIXimab (REMICADE) 100 MG injection 100 mg IV every 8 weeks      Multiple Vitamins-Minerals (MULTIVITAL PO) Take  by mouth.         Medications from List 1 of the standing order (on medications that exclude the use of Paxlovid) that patient is taking: NONE. Is patient taking [place]'s Wort? No  Is patient taking [place]'s Wort or any meds from List 1? No.   Medications from List 2 of the standing order (on meds that provider needs to adjust) that patient is taking: NONE. Is patient on any of the meds from List 2? No.   Medications from List 3 of standing order (on meds that a RN needs to adjust) that patient is taking: NONE. Is patient on any meds from List 3? No.     Paxlovid has an approximate 90% reduction in hospitalization. Paxlovid can possibly cause altered sense of taste, diarrhea (loose, watery stools), high blood pressure, muscle aches.     Would patient like a Paxlovid prescription?   Yes.   Lab Results   Component Value Date    GFRESTIMATED >90 11/17/2023       Was last eGFR reduced? No, eGFR 60 or greater/ No Result on record. Patient can receive the normal renal function dose. Paxlovid Rx sent to [facility] in [place]     Temporary change to home medications: None    All medication adjustments (holds, etc) were discussed with the patient and patient was asked to repeat back (teachback) their med adjustment.  Did patient understand med adjustment? No medication adjustments needed.         Reviewed the following instructions with the patient:    Paxlovid (nimatrelvir and ritonavir)    How it works  Two medicines (nirmatrelvir and ritonavir) are taken together. They stop  the virus from growing. Less amount of virus is easier for your body to fight.    How to take  Medicine comes in a daily container with both medicine tablets. Take by mouth twice daily (once in the morning, once at night) for 5 days.  The number of tablets to take varies by patient.  Don't chew or break capsules. Swallow whole.    When to take  Take as soon as possible after positive COVID-19 test result, and within 5 days of your first symptoms.    Possible side effects  Can cause altered sense of taste, diarrhea (loose, watery stools), high blood pressure, muscle aches.    Blayne Simental RN          Reason for Disposition   HIGH RISK patient (e.g., weak immune system, age > 64 years, obesity with BMI of 30 or higher, pregnant, chronic lung disease or other chronic medical condition) and COVID symptoms (e.g., cough, fever)  (Exceptions: Already seen by doctor or NP/PA and no new or worsening symptoms.)    Additional Information   Negative: Difficult to awaken or acting confused (e.g., disoriented, slurred speech)   Negative: SEVERE difficulty breathing (e.g., struggling for each breath, speaks in single words)   Negative: Bluish (or gray) lips or face now   Negative: Shock suspected (e.g., cold/pale/clammy skin, too weak to stand, low BP, rapid pulse)   Negative: Sounds like a life-threatening emergency to the triager   Negative: Diagnosed or suspected COVID-19 and symptoms lasting 3 or more weeks   Negative: COVID-19 exposure and no symptoms   Negative: COVID-19 vaccine, questions about   Negative: COVID-19 vaccine reaction suspected (e.g., fever, headache, muscle aches) occurring 1 to 3 days after getting vaccine   Negative: Lives with someone known to have influenza (flu test positive) and flu-like symptoms (e.g., cough, runny nose, sore throat, SOB; with or without fever)   Negative: Possible COVID-19 symptoms and triager concerned about severity of symptoms or other causes   Negative: COVID-19 and  breastfeeding, questions about   Negative: SEVERE or constant chest pain or pressure  (Exception: Mild central chest pain, present only when coughing.)   Negative: MODERATE difficulty breathing (e.g., speaks in phrases, SOB even at rest, pulse 100-120)   Negative: Headache and stiff neck (can't touch chin to chest)   Negative: Oxygen level (e.g., pulse oximetry) 90% or lower   Negative: Chest pain or pressure  (Exception: MILD central chest pain, present only when coughing.)   Negative: Drinking very little and dehydration suspected (e.g., no urine > 12 hours, very dry mouth, very lightheaded)   Negative: Patient sounds very sick or weak to the triager   Negative: MILD difficulty breathing (e.g., minimal/no SOB at rest, SOB with walking, pulse <100)   Negative: Fever > 103 F (39.4 C)   Negative: Fever > 101 F (38.3 C) and over 60 years of age   Negative: Fever > 100.0 F (37.8 C) and bedridden (e.g., CVA, chronic illness, recovering from surgery)    Protocols used: Coronavirus (COVID-19) Diagnosed or [id number]

## 2023-12-16 ENCOUNTER — HOSPITAL ENCOUNTER (OUTPATIENT)
Dept: ULTRASOUND IMAGING | Facility: HOSPITAL | Age: 33
Discharge: HOME | End: 2023-12-16
Attending: FAMILY MEDICINE | Admitting: FAMILY MEDICINE
Payer: COMMERCIAL

## 2023-12-16 DIAGNOSIS — R74.8 ELEVATED LIVER ENZYMES: ICD-10-CM

## 2023-12-16 PROCEDURE — 76705 ECHO EXAM OF ABDOMEN: CPT

## 2024-02-12 ENCOUNTER — ANCILLARY PROCEDURE (OUTPATIENT)
Dept: MRI IMAGING | Facility: CLINIC | Age: 34
End: 2024-02-12
Attending: FAMILY MEDICINE
Payer: COMMERCIAL

## 2024-02-12 DIAGNOSIS — R93.2 ABNORMAL LIVER ULTRASOUND: ICD-10-CM

## 2024-02-12 PROCEDURE — 74183 MRI ABD W/O CNTR FLWD CNTR: CPT | Mod: TC | Performed by: RADIOLOGY

## 2024-02-12 PROCEDURE — A9585 GADOBUTROL INJECTION: HCPCS | Performed by: RADIOLOGY

## 2024-02-12 RX ORDER — GADOBUTROL 604.72 MG/ML
0.1 INJECTION INTRAVENOUS ONCE
Status: COMPLETED | OUTPATIENT
Start: 2024-02-12 | End: 2024-02-12

## 2024-02-12 RX ADMIN — GADOBUTROL 7 ML: 604.72 INJECTION INTRAVENOUS at 19:40

## 2024-02-15 ENCOUNTER — TRANSFERRED RECORDS (OUTPATIENT)
Dept: HEALTH INFORMATION MANAGEMENT | Facility: CLINIC | Age: 34
End: 2024-02-15
Payer: COMMERCIAL

## 2024-02-15 LAB
ALT SERPL-CCNC: 44 IU/L (ref 0–44)
AST SERPL-CCNC: 25 IU/L (ref 0–40)
CREATININE (EXTERNAL): 0.89 MG/DL (ref 0.76–1.27)
GFR ESTIMATED (EXTERNAL): 117 ML/MIN/1.73
HEP C HIM: NORMAL

## 2024-02-29 ENCOUNTER — LAB REQUISITION (OUTPATIENT)
Dept: LAB | Facility: CLINIC | Age: 34
End: 2024-02-29
Payer: COMMERCIAL

## 2024-02-29 ENCOUNTER — DOCUMENTATION ONLY (OUTPATIENT)
Dept: PHARMACY | Facility: CLINIC | Age: 34
End: 2024-02-29

## 2024-02-29 DIAGNOSIS — K50.90 CROHN'S DISEASE, UNSPECIFIED, WITHOUT COMPLICATIONS (H): ICD-10-CM

## 2024-02-29 LAB
ALBUMIN SERPL BCG-MCNC: 4.4 G/DL (ref 3.5–5.2)
ALP SERPL-CCNC: 101 U/L (ref 40–150)
ALT SERPL W P-5'-P-CCNC: 236 U/L (ref 0–70)
AST SERPL W P-5'-P-CCNC: 122 U/L (ref 0–45)
BASOPHILS # BLD AUTO: 0 10E3/UL (ref 0–0.2)
BASOPHILS NFR BLD AUTO: 0 %
BILIRUB DIRECT SERPL-MCNC: 0.61 MG/DL (ref 0–0.3)
BILIRUB SERPL-MCNC: 2.9 MG/DL
EOSINOPHIL # BLD AUTO: 0.1 10E3/UL (ref 0–0.7)
EOSINOPHIL NFR BLD AUTO: 2 %
ERYTHROCYTE [DISTWIDTH] IN BLOOD BY AUTOMATED COUNT: 12 % (ref 10–15)
HCT VFR BLD AUTO: 43.3 % (ref 40–53)
HGB BLD-MCNC: 15.4 G/DL (ref 13.3–17.7)
IMM GRANULOCYTES # BLD: 0.1 10E3/UL
IMM GRANULOCYTES NFR BLD: 1 %
LYMPHOCYTES # BLD AUTO: 1.6 10E3/UL (ref 0.8–5.3)
LYMPHOCYTES NFR BLD AUTO: 19 %
MCH RBC QN AUTO: 32.1 PG (ref 26.5–33)
MCHC RBC AUTO-ENTMCNC: 35.6 G/DL (ref 31.5–36.5)
MCV RBC AUTO: 90 FL (ref 78–100)
MONOCYTES # BLD AUTO: 0.8 10E3/UL (ref 0–1.3)
MONOCYTES NFR BLD AUTO: 10 %
NEUTROPHILS # BLD AUTO: 5.4 10E3/UL (ref 1.6–8.3)
NEUTROPHILS NFR BLD AUTO: 68 %
NRBC # BLD AUTO: 0 10E3/UL
NRBC BLD AUTO-RTO: 0 /100
PLATELET # BLD AUTO: 218 10E3/UL (ref 150–450)
PROT SERPL-MCNC: 8.2 G/DL (ref 6.4–8.3)
RBC # BLD AUTO: 4.8 10E6/UL (ref 4.4–5.9)
WBC # BLD AUTO: 8 10E3/UL (ref 4–11)

## 2024-02-29 PROCEDURE — 82040 ASSAY OF SERUM ALBUMIN: CPT | Performed by: INTERNAL MEDICINE

## 2024-02-29 PROCEDURE — 85004 AUTOMATED DIFF WBC COUNT: CPT | Performed by: INTERNAL MEDICINE

## 2024-03-01 ENCOUNTER — OFFICE VISIT (OUTPATIENT)
Dept: URGENT CARE | Facility: URGENT CARE | Age: 34
End: 2024-03-01
Payer: COMMERCIAL

## 2024-03-01 VITALS
BODY MASS INDEX: 24.94 KG/M2 | RESPIRATION RATE: 18 BRPM | DIASTOLIC BLOOD PRESSURE: 72 MMHG | SYSTOLIC BLOOD PRESSURE: 119 MMHG | HEART RATE: 111 BPM | WEIGHT: 151 LBS | TEMPERATURE: 101.9 F | OXYGEN SATURATION: 98 %

## 2024-03-01 DIAGNOSIS — R19.7 DIARRHEA, UNSPECIFIED TYPE: ICD-10-CM

## 2024-03-01 DIAGNOSIS — R50.9 FEVER, UNSPECIFIED: Primary | ICD-10-CM

## 2024-03-01 LAB
BASOPHILS # BLD AUTO: 0 10E3/UL (ref 0–0.2)
BASOPHILS NFR BLD AUTO: 0 %
DEPRECATED S PYO AG THROAT QL EIA: NEGATIVE
EOSINOPHIL # BLD AUTO: 0 10E3/UL (ref 0–0.7)
EOSINOPHIL NFR BLD AUTO: 1 %
ERYTHROCYTE [DISTWIDTH] IN BLOOD BY AUTOMATED COUNT: 11.5 % (ref 10–15)
FLUAV AG SPEC QL IA: NEGATIVE
FLUBV AG SPEC QL IA: NEGATIVE
GROUP A STREP BY PCR: NOT DETECTED
HCT VFR BLD AUTO: 41.8 % (ref 40–53)
HGB BLD-MCNC: 14.5 G/DL (ref 13.3–17.7)
IMM GRANULOCYTES # BLD: 0 10E3/UL
IMM GRANULOCYTES NFR BLD: 0 %
LYMPHOCYTES # BLD AUTO: 1.7 10E3/UL (ref 0.8–5.3)
LYMPHOCYTES NFR BLD AUTO: 19 %
MCH RBC QN AUTO: 31.5 PG (ref 26.5–33)
MCHC RBC AUTO-ENTMCNC: 34.7 G/DL (ref 31.5–36.5)
MCV RBC AUTO: 91 FL (ref 78–100)
MONOCYTES # BLD AUTO: 1.4 10E3/UL (ref 0–1.3)
MONOCYTES NFR BLD AUTO: 16 %
NEUTROPHILS # BLD AUTO: 5.7 10E3/UL (ref 1.6–8.3)
NEUTROPHILS NFR BLD AUTO: 64 %
PLATELET # BLD AUTO: 179 10E3/UL (ref 150–450)
RBC # BLD AUTO: 4.61 10E6/UL (ref 4.4–5.9)
WBC # BLD AUTO: 8.9 10E3/UL (ref 4–11)

## 2024-03-01 PROCEDURE — 87651 STREP A DNA AMP PROBE: CPT | Performed by: PHYSICIAN ASSISTANT

## 2024-03-01 PROCEDURE — 87635 SARS-COV-2 COVID-19 AMP PRB: CPT | Performed by: PHYSICIAN ASSISTANT

## 2024-03-01 PROCEDURE — 87804 INFLUENZA ASSAY W/OPTIC: CPT | Performed by: PHYSICIAN ASSISTANT

## 2024-03-01 PROCEDURE — 85025 COMPLETE CBC W/AUTO DIFF WBC: CPT | Performed by: PHYSICIAN ASSISTANT

## 2024-03-01 PROCEDURE — 99213 OFFICE O/P EST LOW 20 MIN: CPT | Performed by: PHYSICIAN ASSISTANT

## 2024-03-01 PROCEDURE — 80053 COMPREHEN METABOLIC PANEL: CPT | Performed by: PHYSICIAN ASSISTANT

## 2024-03-01 PROCEDURE — 36415 COLL VENOUS BLD VENIPUNCTURE: CPT | Performed by: PHYSICIAN ASSISTANT

## 2024-03-01 NOTE — PROGRESS NOTES
SUBJECTIVE:   Emil Garcia is a 33 year old male presenting with a chief complaint of   Chief Complaint   Patient presents with    Urgent Care    Abdominal Pain     Per patient states symptoms started today having abdominal cramping and diarrhea, requesting stool sample as he believes it's due to bad pasta he had        He is an established patient of [place].  Patient presents with complaints of 2 episodes of diarrhea, nonbloody since 10 am.  Patient is on remicade.  Unaware of fever here.  Has abdominal cramping before and after BM.  No other complaints.  Hx of c.diff.  No travel.  States thinks it is from pasta.  States has not had diarrhea with previous remicade.      Treatment:  none    Review of Systems    Past Medical History:   Diagnosis Date    Cancer (H) 05/05/2018    WILL NOT ALLOW US TO COMPLETE THE  QUESTTIONAIR WITHOUT MARKING YES TO THE DIAGNOSIS TO CANCER    Crohn's colitis (H)      Family History   Problem Relation Age of Onset    Macular Degeneration Paternal Grandfather     Hypertension Paternal Grandfather     Hypertension Paternal Grandmother     Glaucoma No family hx of      Current Outpatient Medications   Medication Sig Dispense Refill    Cholecalciferol (VITAMIN D PO) Take by mouth daily      inFLIXimab (REMICADE) 100 MG injection 100 mg IV every 8 weeks      Multiple Vitamins-Minerals (MULTIVITAL PO) Take  by mouth.       Social History     Tobacco Use    Smoking status: Never    Smokeless tobacco: Never   Substance Use Topics    Alcohol use: Yes     Alcohol/week: 1.0 - 2.0 standard drink of alcohol     Types: 1 - 2 Cans of beer per week     Comment: rarely       OBJECTIVE  /72   Pulse 111   Temp (!) 101.9  F (38.8  C) (Tympanic)   Resp 18   Wt 68.5 kg (151 lb)   SpO2 98%   BMI 24.94 kg/m      Physical Exam  Vitals and nursing note reviewed.   Constitutional:       Appearance: Normal appearance. He is normal weight.   Eyes:      Extraocular Movements: Extraocular movements  intact.      Conjunctiva/sclera: Conjunctivae normal.   Cardiovascular:      Rate and Rhythm: Normal rate and regular rhythm.      Pulses: Normal pulses.      Heart sounds: Normal heart sounds.   Pulmonary:      Effort: Pulmonary effort is normal.      Breath sounds: Normal breath sounds.   Abdominal:      General: Abdomen is flat. Bowel sounds are normal.      Palpations: Abdomen is soft.      Tenderness: There is no abdominal tenderness.      Comments: Hypoactive BS.   Skin:     General: Skin is warm and dry.   Neurological:      Mental Status: He is alert.   Psychiatric:         Mood and Affect: Mood normal.         Labs:  Results for orders placed or performed in visit on 03/01/24 (from the past 24 hour(s))   Streptococcus A Rapid Screen w/Reflex to PCR - Clinic Collect    Specimen: Throat; Swab   Result Value Ref Range    Group A Strep antigen Negative Negative   Influenza A & B Antigen - Clinic Collect    Specimen: Nose; Swab   Result Value Ref Range    Influenza A antigen Negative Negative    Influenza B antigen Negative Negative    Narrative    Test results must be correlated with clinical data. If necessary, results should be confirmed by a molecular assay or viral culture.   CBC with platelets and differential    Narrative    The following orders were created for panel order CBC with platelets and differential.  Procedure                               Abnormality         Status                     ---------                               -----------         ------                     CBC with platelets and d...[[id number]]  Abnormal            Final result                 Please view results for these tests on the individual orders.   CBC with platelets and differential   Result Value Ref Range    WBC Count 8.9 4.0 - 11.0 10e3/uL    RBC Count 4.61 4.40 - 5.90 10e6/uL    Hemoglobin 14.5 13.3 - 17.7 g/dL    Hematocrit 41.8 40.0 - 53.0 %    MCV 91 78 - 100 fL    MCH 31.5 26.5 - 33.0 pg    MCHC 34.7 31.5 - 36.5  g/dL    RDW 11.5 10.0 - 15.0 %    Platelet Count 179 150 - 450 10e3/uL    % Neutrophils 64 %    % Lymphocytes 19 %    % Monocytes 16 %    % Eosinophils 1 %    % Basophils 0 %    % Immature Granulocytes 0 %    Absolute Neutrophils 5.7 1.6 - 8.3 10e3/uL    Absolute Lymphocytes 1.7 0.8 - 5.3 10e3/uL    Absolute Monocytes 1.4 (H) 0.0 - 1.3 10e3/uL    Absolute Eosinophils 0.0 0.0 - 0.7 10e3/uL    Absolute Basophils 0.0 0.0 - 0.2 10e3/uL    Absolute Immature Granulocytes 0.0 <=0.4 10e3/uL       ASSESSMENT:      ICD-10-CM    1. Fever, unspecified  R50.9 Streptococcus A Rapid Screen w/Reflex to PCR - Clinic Collect     Influenza A & B Antigen - Clinic Collect     Symptomatic COVID-19 Virus (Coronavirus) by PCR Nose     Group A Streptococcus PCR Throat Swab     CBC with platelets and differential     Comprehensive metabolic panel (BMP + Alb, Alk Phos, ALT, AST, Total. Bili, TP)     CBC with platelets and differential     Comprehensive metabolic panel (BMP + Alb, Alk Phos, ALT, AST, Total. Bili, TP)      2. Diarrhea, unspecified type  R19.7 C. difficile Toxin B PCR with reflex to C. difficile Antigen and Toxins A/B EIA     Enteric Bacteria and Virus Panel by REGINA Stool     C. difficile Toxin B PCR with reflex to C. difficile Antigen and Toxins A/B EIA     Enteric Bacteria and Virus Panel by REGINA Stool           Medical Decision Making:    Differential Diagnosis:  Gastro: viral gastroenteritis, food poisoning, and c.diff    Serious Comorbid Conditions:  Adult:   reviewed    PLAN:    CMP, c.diff and stool cultures pending.  BRAT diet.  Drink plenty of water.  Discussed reasons to seek immediate medical attention.  Additionally if no improvement or worsening in one week, may follow up with PCP and/or UC.        Followup:    If not improving or if condition worsens, follow up with your Primary Care Provider, If not improving or if conditions worsens over the next 12-24 hours, go to the Emergency Department    There are no Patient  Instructions on file for this visit.

## 2024-03-01 NOTE — PROGRESS NOTES
Skilled Nurse visit in the Patient Home to administer Inflectra 600mg IV over 2 hours.  No recent elevated temperature, fever, chills, productive cough, coughing for 3 weeks or longer or hemoptysis, abnormal vital signs, night sweats, chest pain. No  decrease in your appetite, unexplained weight loss or fatigue.  No other new onset medical symptoms.  Current weight 150.2lbs.  Peripheral IVleft AC, 1 attempt  Labs drawn CBCDP, Hepatic panel. Infusion completed without complication or reaction. Pt reports therapy is effective in managing symptoms related to therapy.     Claribel Rg RN  [facility]  [email or web address]  [phone]

## 2024-03-02 LAB
C DIFF TOX B STL QL: NEGATIVE
SARS-COV-2 RNA RESP QL NAA+PROBE: NEGATIVE

## 2024-03-02 PROCEDURE — 87507 IADNA-DNA/RNA PROBE TQ 12-25: CPT | Performed by: PHYSICIAN ASSISTANT

## 2024-03-02 PROCEDURE — 87493 C DIFF AMPLIFIED PROBE: CPT | Mod: 59 | Performed by: PHYSICIAN ASSISTANT

## 2024-03-03 ENCOUNTER — TELEPHONE (OUTPATIENT)
Dept: URGENT CARE | Facility: URGENT CARE | Age: 34
End: 2024-03-03
Payer: COMMERCIAL

## 2024-03-03 LAB
ADV 40+41 DNA STL QL NAA+NON-PROBE: NEGATIVE
ALBUMIN SERPL BCG-MCNC: 4.1 G/DL (ref 3.5–5.2)
ALP SERPL-CCNC: 107 U/L (ref 40–150)
ALT SERPL W P-5'-P-CCNC: 238 U/L (ref 0–70)
ANION GAP SERPL CALCULATED.3IONS-SCNC: 11 MMOL/L (ref 7–15)
AST SERPL W P-5'-P-CCNC: 96 U/L (ref 0–45)
ASTRO TYP 1-8 RNA STL QL NAA+NON-PROBE: NEGATIVE
BILIRUB SERPL-MCNC: 3.6 MG/DL
BUN SERPL-MCNC: 13.1 MG/DL (ref 6–20)
C CAYETANENSIS DNA STL QL NAA+NON-PROBE: NEGATIVE
CALCIUM SERPL-MCNC: 9.3 MG/DL (ref 8.6–10)
CAMPYLOBACTER DNA SPEC NAA+PROBE: NEGATIVE
CHLORIDE SERPL-SCNC: 102 MMOL/L (ref 98–107)
CREAT SERPL-MCNC: 1.02 MG/DL (ref 0.67–1.17)
CRYPTOSP DNA STL QL NAA+NON-PROBE: NEGATIVE
DEPRECATED HCO3 PLAS-SCNC: 22 MMOL/L (ref 22–29)
E COLI O157 DNA STL QL NAA+NON-PROBE: NORMAL
E HISTOLYT DNA STL QL NAA+NON-PROBE: NEGATIVE
EAEC ASTA GENE ISLT QL NAA+PROBE: NEGATIVE
EC STX1+STX2 GENES STL QL NAA+NON-PROBE: NEGATIVE
EGFRCR SERPLBLD CKD-EPI 2021: >90 ML/MIN/1.73M2
EPEC EAE GENE STL QL NAA+NON-PROBE: NEGATIVE
ETEC LTA+ST1A+ST1B TOX ST NAA+NON-PROBE: NEGATIVE
G LAMBLIA DNA STL QL NAA+NON-PROBE: NEGATIVE
GLUCOSE SERPL-MCNC: 104 MG/DL (ref 70–99)
NOROVIRUS GI+II RNA STL QL NAA+NON-PROBE: NEGATIVE
P SHIGELLOIDES DNA STL QL NAA+NON-PROBE: NEGATIVE
POTASSIUM SERPL-SCNC: 4.1 MMOL/L (ref 3.4–5.3)
PROT SERPL-MCNC: 7.6 G/DL (ref 6.4–8.3)
RVA RNA STL QL NAA+NON-PROBE: NEGATIVE
SALMONELLA SP RPOD STL QL NAA+PROBE: NEGATIVE
SAPO I+II+IV+V RNA STL QL NAA+NON-PROBE: NEGATIVE
SHIGELLA SP+EIEC IPAH ST NAA+NON-PROBE: NEGATIVE
SODIUM SERPL-SCNC: 135 MMOL/L (ref 135–145)
V CHOLERAE DNA SPEC QL NAA+PROBE: NEGATIVE
VIBRIO DNA SPEC NAA+PROBE: NEGATIVE
Y ENTEROCOL DNA STL QL NAA+PROBE: NEGATIVE

## 2024-03-03 NOTE — TELEPHONE ENCOUNTER
Symptoms    Describe your symptoms: Dark urine    Any pain: No    How long have you been having symptoms:  29 days    Have you been seen for this:  Yes: Patient was seen in urgent care as of 3/1/24 for cramps and diarrhea.    Appointment offered?: No    Triage offered?: No    Home remedies tried: Patient has tried more water in take but is not seeing any improvement and wanted to inform for the possibility of more tests being needed since last UC visit at the [facility].    Preferred Pharmacy:     [id number] - [address]  AT [address]  [place] [address]  Phone: [phone] Fax: [phone]      Could we send this information to you in [place] or would you prefer to receive a phone call?:   Patient would prefer a phone call   Okay to leave a detailed message?: Yes at Cell number on file:    Telephone Information:   Mobile [phone]

## 2024-03-05 NOTE — TELEPHONE ENCOUNTER
Called patient no answer left msg informing he will  need to follow up with his pcp for further evaluation.  Isabell De León MA

## 2024-03-11 ENCOUNTER — MEDICAL CORRESPONDENCE (OUTPATIENT)
Dept: HEALTH INFORMATION MANAGEMENT | Facility: CLINIC | Age: 34
End: 2024-03-11
Payer: COMMERCIAL

## 2024-04-25 ENCOUNTER — LAB REQUISITION (OUTPATIENT)
Dept: LAB | Facility: CLINIC | Age: 34
End: 2024-04-25
Payer: COMMERCIAL

## 2024-04-25 DIAGNOSIS — K50.90 CROHN'S DISEASE, UNSPECIFIED, WITHOUT COMPLICATIONS (H): ICD-10-CM

## 2024-04-25 LAB
ALBUMIN SERPL BCG-MCNC: 4.5 G/DL (ref 3.5–5.2)
ALP SERPL-CCNC: 63 U/L (ref 40–150)
ALT SERPL W P-5'-P-CCNC: 31 U/L (ref 0–70)
AST SERPL W P-5'-P-CCNC: 23 U/L (ref 0–45)
BILIRUB DIRECT SERPL-MCNC: <0.2 MG/DL (ref 0–0.3)
BILIRUB SERPL-MCNC: 0.9 MG/DL
PROT SERPL-MCNC: 7.9 G/DL (ref 6.4–8.3)

## 2024-04-25 PROCEDURE — 80076 HEPATIC FUNCTION PANEL: CPT | Performed by: INTERNAL MEDICINE

## 2024-05-14 ENCOUNTER — TRANSFERRED RECORDS (OUTPATIENT)
Dept: HEALTH INFORMATION MANAGEMENT | Facility: CLINIC | Age: 34
End: 2024-05-14
Payer: COMMERCIAL

## 2024-08-15 ENCOUNTER — LAB REQUISITION (OUTPATIENT)
Dept: LAB | Facility: CLINIC | Age: 34
End: 2024-08-15
Payer: COMMERCIAL

## 2024-08-15 DIAGNOSIS — K50.90 CROHN'S DISEASE, UNSPECIFIED, WITHOUT COMPLICATIONS (H): ICD-10-CM

## 2024-08-15 LAB
ALBUMIN SERPL BCG-MCNC: 4.3 G/DL (ref 3.5–5.2)
ALP SERPL-CCNC: 61 U/L (ref 40–150)
ALT SERPL W P-5'-P-CCNC: 35 U/L (ref 0–70)
AST SERPL W P-5'-P-CCNC: 22 U/L (ref 0–45)
BASOPHILS # BLD AUTO: 0 10E3/UL (ref 0–0.2)
BASOPHILS NFR BLD AUTO: 0 %
BILIRUB DIRECT SERPL-MCNC: <0.2 MG/DL (ref 0–0.3)
BILIRUB SERPL-MCNC: 0.9 MG/DL
EOSINOPHIL # BLD AUTO: 0.2 10E3/UL (ref 0–0.7)
EOSINOPHIL NFR BLD AUTO: 4 %
ERYTHROCYTE [DISTWIDTH] IN BLOOD BY AUTOMATED COUNT: 12.1 % (ref 10–15)
HCT VFR BLD AUTO: 44.5 % (ref 40–53)
HGB BLD-MCNC: 16.2 G/DL (ref 13.3–17.7)
IMM GRANULOCYTES # BLD: 0 10E3/UL
IMM GRANULOCYTES NFR BLD: 0 %
LYMPHOCYTES # BLD AUTO: 2.8 10E3/UL (ref 0.8–5.3)
LYMPHOCYTES NFR BLD AUTO: 45 %
MCH RBC QN AUTO: 32.1 PG (ref 26.5–33)
MCHC RBC AUTO-ENTMCNC: 36.4 G/DL (ref 31.5–36.5)
MCV RBC AUTO: 88 FL (ref 78–100)
MONOCYTES # BLD AUTO: 0.4 10E3/UL (ref 0–1.3)
MONOCYTES NFR BLD AUTO: 6 %
NEUTROPHILS # BLD AUTO: 2.8 10E3/UL (ref 1.6–8.3)
NEUTROPHILS NFR BLD AUTO: 45 %
NRBC # BLD AUTO: 0 10E3/UL
NRBC BLD AUTO-RTO: 0 /100
PLATELET # BLD AUTO: 293 10E3/UL (ref 150–450)
PROT SERPL-MCNC: 7.8 G/DL (ref 6.4–8.3)
RBC # BLD AUTO: 5.04 10E6/UL (ref 4.4–5.9)
WBC # BLD AUTO: 6.1 10E3/UL (ref 4–11)

## 2024-08-15 PROCEDURE — 85025 COMPLETE CBC W/AUTO DIFF WBC: CPT | Performed by: INTERNAL MEDICINE

## 2024-08-15 PROCEDURE — 80076 HEPATIC FUNCTION PANEL: CPT | Performed by: INTERNAL MEDICINE

## 2024-08-16 ENCOUNTER — DOCUMENTATION ONLY (OUTPATIENT)
Dept: PHARMACY | Facility: CLINIC | Age: 34
End: 2024-08-16
Payer: COMMERCIAL

## 2024-08-16 NOTE — PROGRESS NOTES
Skilled Nurse visit in the Patient Home to administer Inflectra on 8/15/24.  No recent elevated temperature, fever, chills, productive cough, coughing for 3 weeks or longer or hemoptysis, abnormal vital signs, night sweats, chest pain. No  decrease in your appetite, unexplained weight loss or fatigue.  No other new onset medical symptoms.  Peripheral IVleft AC, 1 attempt. Labs drawn CBC/diff/PLTS, hepatic panel. Infusion completed without complication or reaction. Pt reports therapy iseffective in managing symptoms related to therapy.    aMrissa EDUARDO RN, BSN  [facility]  [phone]

## 2024-09-05 ENCOUNTER — ENROLLMENT (OUTPATIENT)
Dept: HOME HEALTH SERVICES | Facility: HOME HEALTH | Age: 34
End: 2024-09-05
Payer: COMMERCIAL

## 2024-09-21 NOTE — PROGRESS NOTES
AUTH REQUIRED DAY ORDERS (or CHANGES IN ORDERS) ARE RECEIVED.  PLEASE NOTIFY PA TEAM ONCE ORDERS RECEIVED.    01/05/23: PT HAS TPA, LINECARE, AND HYDRATION COVERAGE [facility]

## 2024-10-10 ENCOUNTER — DOCUMENTATION ONLY (OUTPATIENT)
Dept: HOME HEALTH SERVICES | Facility: HOME HEALTH | Age: 34
End: 2024-10-10
Payer: COMMERCIAL

## 2024-10-10 NOTE — PROGRESS NOTES
Skilled Nurse visit in the Patient Home to administer 600mg IV Inflectra.  No recent elevated temperature, fever, chills, productive cough, coughing for 3 weeks or longer or hemoptysis, abnormal vital signs, night sweats, chest pain. No  decrease in your appetite, unexplained weight loss or fatigue.  No other new onset medical symptoms.  Current weight 150lbs.  Peripheral IVleft AC, [id number] Pre medicated with NONE. Labs drawn NONE- not due today. Infusion completed without complication or reaction. Pt reports therapy iseffective in managing symptoms related to therapy.        OTILIA Thomas RN  [facility]   [phone]  [email or web address]

## 2024-11-20 ENCOUNTER — TRANSFERRED RECORDS (OUTPATIENT)
Dept: HEALTH INFORMATION MANAGEMENT | Facility: CLINIC | Age: 34
End: 2024-11-20
Payer: COMMERCIAL

## 2024-12-05 ENCOUNTER — HOME INFUSION BILLING (OUTPATIENT)
Dept: HOME HEALTH SERVICES | Facility: HOME HEALTH | Age: 34
End: 2024-12-05
Payer: COMMERCIAL

## 2024-12-05 ENCOUNTER — HOME CARE VISIT (OUTPATIENT)
Dept: HOME HEALTH SERVICES | Facility: HOME HEALTH | Age: 34
End: 2024-12-05
Payer: COMMERCIAL

## 2024-12-05 VITALS
RESPIRATION RATE: 16 BRPM | SYSTOLIC BLOOD PRESSURE: 102 MMHG | HEART RATE: 80 BPM | OXYGEN SATURATION: 99 % | DIASTOLIC BLOOD PRESSURE: 82 MMHG | BODY MASS INDEX: 23.02 KG/M2 | TEMPERATURE: 97.8 F | WEIGHT: 146.98 LBS

## 2024-12-05 NOTE — PROGRESS NOTES
"Infusion Nursing Note:  Skilled nurse visit today for Inflectra infusion  for Emil Garcia.   present during visit today: Not Applicable.    Pre-infusion Checklist:   Pre-infusion Checklist    Have you had any delayed reaction since last infusion?   No    Have you recently had an elevated temperature, fever, chills productive cough, coughing for 3 weeks or longer or hemoptysis, abnormal vital signs, night sweats, chest pain, or have you noticed a decrease in your appetite, or noted unexplained weight loss or fatigue?   No    Do you have any open wounds or new incisions?  No    Do you have any recent or upcoming hospitalizations, surgeries, or dental procedures?   No    Do you currently have or recently have had any signs of illness or infection or are you on any antibiotics?   No    Have you had any new, sudden , or worsening abdominal pain?   No    Have you or anyone in your household received a live vaccination in the past 4 weeks?   No    Have you recently been diagnosed with any new nervous system diseases or cancer diagnosis? (i.e., Multiple Sclerosis, Guillain [place], sezures, neurological changes) Are you receiving any form of radiation or chemotherapy?   No    Are you pregnant or breastfeeding, or do you have plans of pregnancy in the future?   No    Have you been having any signs of worsening depression or suicidal ideation?  No    Have there been any other new onset medical symptoms?  No    Did the patient answer \"YES\" to any of the questions above?  No    Will the patient receive a medication that has an order for infusion reaction management?  Yes, and all drugs and supplies are available and none have .    If ordered, has the patient taken pre-medications?  N/A    Plan:   Therapy is appropriate, will proceed with treatment.     Chemotherapy Treatment Conditions:   Not Applicable    Intravenous Access:  Peripheral IV placed.    Post Infusion Assessment:  Patient tolerated infusion " without incident.  Site patent and intact, free from redness, edema or discomfort.  Access discontinued per protocol.  Biologic Infusion Post Education: Call the triage nurse at your clinic or seek medical attention if you have chills and/or temperature greater than or equal to 100.5, uncontrolled nausea/vomiting, diarrhea, constipation, dizziness, shortness of breath, chest pain, heart palpitations, weakness or any other new or concerning symptoms, questions or concerns.  You cannot have any live virus vaccines prior to or during treatment or up to 6 months post infusion.  If you have an upcoming surgery, medical procedure or dental procedure during treatment, this should be discussed with your ordering physician and your surgeon/dentist.  If you are having any concerning symptom, if you are unsure if you should get your next infusion or wish to speak to a provider before your next infusion, please call your care coordinator or triage nurse at your clinic to notify them so we can adequately serve you.     Note: Tolerated without side effects     Saline administered (in mLs): 20    Next Visit Plan:   8 weeks. January 30      Gill Meza RN 12/5/2024

## 2024-12-29 ENCOUNTER — HEALTH MAINTENANCE LETTER (OUTPATIENT)
Age: 34
End: 2024-12-29

## 2025-01-07 ENCOUNTER — HOME INFUSION (OUTPATIENT)
Dept: HOME HEALTH SERVICES | Facility: HOME HEALTH | Age: 35
End: 2025-01-07
Payer: COMMERCIAL

## 2025-01-07 DIAGNOSIS — K50.10 CROHN'S DISEASE OF LARGE INTESTINE WITHOUT COMPLICATION (H): ICD-10-CM

## 2025-01-09 RX ORDER — DIPHENHYDRAMINE HYDROCHLORIDE 50 MG/ML
50 INJECTION INTRAMUSCULAR; INTRAVENOUS PRN
Qty: 99999 ML | Refills: 0 | Status: ACTIVE | OUTPATIENT
Start: 2025-01-09 | End: 2026-01-09

## 2025-01-09 RX ORDER — SODIUM CHLORIDE 9 MG/ML
500 INJECTION, SOLUTION INTRAVENOUS PRN
Qty: 999999 ML | Refills: 0 | Status: ACTIVE | OUTPATIENT
Start: 2025-01-09 | End: 2026-01-09

## 2025-01-09 RX ORDER — WATER 10 ML/10ML
60 INJECTION INTRAMUSCULAR; INTRAVENOUS; SUBCUTANEOUS
Qty: 360 ML | Refills: 0 | Status: DISPENSED | OUTPATIENT
Start: 2025-01-09 | End: 2026-01-09

## 2025-01-09 RX ORDER — EPINEPHRINE 0.3 MG/.3ML
0.3 INJECTION SUBCUTANEOUS PRN
Qty: 999999 ML | Refills: 0 | Status: ACTIVE | OUTPATIENT
Start: 2025-01-09 | End: 2026-01-09

## 2025-01-15 ENCOUNTER — TRANSFERRED RECORDS (OUTPATIENT)
Dept: HEALTH INFORMATION MANAGEMENT | Facility: CLINIC | Age: 35
End: 2025-01-15
Payer: COMMERCIAL

## 2025-01-21 SDOH — HEALTH STABILITY: PHYSICAL HEALTH: ON AVERAGE, HOW MANY MINUTES DO YOU ENGAGE IN EXERCISE AT THIS LEVEL?: 20 MIN

## 2025-01-21 SDOH — HEALTH STABILITY: PHYSICAL HEALTH: ON AVERAGE, HOW MANY DAYS PER WEEK DO YOU ENGAGE IN MODERATE TO STRENUOUS EXERCISE (LIKE A BRISK WALK)?: 5 DAYS

## 2025-01-21 ASSESSMENT — SOCIAL DETERMINANTS OF HEALTH (SDOH): HOW OFTEN DO YOU GET TOGETHER WITH FRIENDS OR RELATIVES?: ONCE A WEEK

## 2025-01-23 ENCOUNTER — OFFICE VISIT (OUTPATIENT)
Dept: FAMILY MEDICINE | Facility: CLINIC | Age: 35
End: 2025-01-23
Payer: COMMERCIAL

## 2025-01-23 VITALS
TEMPERATURE: 98.1 F | RESPIRATION RATE: 16 BRPM | SYSTOLIC BLOOD PRESSURE: 117 MMHG | HEIGHT: 65 IN | DIASTOLIC BLOOD PRESSURE: 75 MMHG | WEIGHT: 150.13 LBS | HEART RATE: 105 BPM | BODY MASS INDEX: 25.01 KG/M2 | OXYGEN SATURATION: 100 %

## 2025-01-23 DIAGNOSIS — Z00.00 ROUTINE GENERAL MEDICAL EXAMINATION AT A HEALTH CARE FACILITY: Primary | ICD-10-CM

## 2025-01-23 DIAGNOSIS — K50.10 CROHN'S DISEASE OF COLON WITHOUT COMPLICATION (H): ICD-10-CM

## 2025-01-23 DIAGNOSIS — R09.82 POST-NASAL DRIP: ICD-10-CM

## 2025-01-23 PROBLEM — Z86.19 HISTORY OF HELICOBACTER PYLORI INFECTION: Status: ACTIVE | Noted: 2025-01-23

## 2025-01-23 RX ORDER — LORATADINE 10 MG/1
TABLET ORAL EVERY 24 HOURS
COMMUNITY
Start: 2022-11-01

## 2025-01-23 RX ORDER — LANOLIN ALCOHOL/MO/W.PET/CERES
1000 CREAM (GRAM) TOPICAL DAILY
COMMUNITY
Start: 2025-01-23

## 2025-01-23 RX ORDER — FLUTICASONE PROPIONATE 50 MCG
1 SPRAY, SUSPENSION (ML) NASAL DAILY
Qty: 16 G | Refills: 11 | Status: SHIPPED | OUTPATIENT
Start: 2025-01-23

## 2025-01-23 NOTE — PATIENT INSTRUCTIONS
Patient Education   Preventive Care Advice   This is general advice given by our system to help you stay healthy. However, your care team may have specific advice just for you. Please talk to your care team about your preventive care needs.  Nutrition  Eat 5 or more servings of fruits and vegetables each day.  Try wheat bread, brown rice and whole grain pasta (instead of white bread, rice, and pasta).  Get enough calcium and vitamin D. Check the label on foods and aim for 100% of the RDA (recommended daily allowance).  Lifestyle  Exercise at least 150 minutes each week  (30 minutes a day, 5 days a week).  Do muscle strengthening activities 2 days a week. These help control your weight and prevent disease.  No smoking.  Wear sunscreen to prevent skin cancer.  Have a dental exam and cleaning every 6 months.  Yearly exams  See your health care team every year to talk about:  Any changes in your health.  Any medicines your care team has prescribed.  Preventive care, family planning, and ways to prevent chronic diseases.  Shots (vaccines)   HPV shots (up to age 26), if you've never had them before.  Hepatitis B shots (up to age 59), if you've never had them before.  COVID-19 shot: Get this shot when it's due.  Flu shot: Get a flu shot every year.  Tetanus shot: Get a tetanus shot every 10 years.  Pneumococcal, hepatitis A, and RSV shots: Ask your care team if you need these based on your risk.  Shingles shot (for age 50 and up)  General health tests  Diabetes screening:  Starting at age 35, Get screened for diabetes at least every 3 years.  If you are younger than age 35, ask your care team if you should be screened for diabetes.  Cholesterol test: At age 39, start having a cholesterol test every 5 years, or more often if advised.  Bone density scan (DEXA): At age 50, ask your care team if you should have this scan for osteoporosis (brittle bones).  Hepatitis C: Get tested at least once in your life.  STIs (sexually  transmitted infections)  Before age 24: Ask your care team if you should be screened for STIs.  After age 24: Get screened for STIs if you're at risk. You are at risk for STIs (including HIV) if:  You are sexually active with more than one person.  You don't use condoms every time.  You or a partner was diagnosed with a sexually transmitted infection.  If you are at risk for HIV, ask about PrEP medicine to prevent HIV.  Get tested for HIV at least once in your life, whether you are at risk for HIV or not.  Cancer screening tests  Cervical cancer screening: If you have a cervix, begin getting regular cervical cancer screening tests starting at age 21.  Breast cancer scan (mammogram): If you've ever had breasts, begin having regular mammograms starting at age 40. This is a scan to check for breast cancer.  Colon cancer screening: It is important to start screening for colon cancer at age 45.  Have a colonoscopy test every 10 years (or more often if you're at risk) Or, ask your provider about stool tests like a FIT test every year or Cologuard test every 3 years.  To learn more about your testing options, visit:   .  For help making a decision, visit:   https://bit.ly/lx51833.  Prostate cancer screening test: If you have a prostate, ask your care team if a prostate cancer screening test (PSA) at age 55 is right for you.  Lung cancer screening: If you are a current or former smoker ages 50 to 80, ask your care team if ongoing lung cancer screenings are right for you.  For informational purposes only. Not to replace the advice of your health care provider. Copyright   2023 [employer]. All rights reserved. Clinically reviewed by the [facility]. inDinero [id number] - REV 01/24.

## 2025-01-23 NOTE — PROGRESS NOTES
"Preventive Care Visit  [facility]  Fernando Ed Sinha PA-C, Family Medicine  Jan 23, 2025      Assessment & Plan     Routine general medical examination at a health care facility  Overall stable wellness.  Per chart review lipids have been elevated in the past.  Not fasting today recommending fasting lab only appointments.  Will monitor.  If stable we will monitor annually.  - Lipid panel reflex to direct LDL Fasting; Future    Post-nasal drip  Self-reported exam benign.  Possibly allergic etiologies.  Recommending addition of Flonase to her regimen to his Claritin.  Consider ENT referral in the future if persistent or worsening symptoms develop.  Otherwise reassured.  - fluticasone (FLONASE) 50 MCG/ACT nasal spray; Spray 1 spray into both nostrils daily.  Medication use and side effects discussed with the patient. Patient is in complete understanding and agreement with plan.       Crohn's disease of colon without complication (H)  Followed by RICARDA Crespo.    Patient has been advised of split billing requirements and indicates understanding: Yes        BMI  Estimated body mass index is 25.37 kg/m  as calculated from the following:    Height as of this encounter: 1.638 m (5' 4.5\").    Weight as of this encounter: 68.1 kg (150 lb 2 oz).       Counseling  Appropriate preventive services were addressed with this patient via screening, questionnaire, or discussion as appropriate for fall prevention, nutrition, physical activity, Tobacco-use cessation, social engagement, weight loss and cognition.  Checklist reviewing preventive services available has been given to the patient.  Reviewed patient's diet, addressing concerns and/or questions.         Henri   Sen is a 34 year old, presenting for the following:  Physical        1/23/2025     2:09 PM   Additional Questions   Roomed by Silva GRACE CMA          HPI  Patient is a 34-year-old male with a past history of Crohn's disease " followed by GI.  Stable on infliximab infusions.  Presents today to establish care as well as to undergo his wellness visit.    Has 1 concern of ongoing postnasal drainage.  History of allergies and takes Claritin for this.  No other treatments tried.        Health Care Directive  Patient does not have a Health Care Directive: Discussed advance care planning with patient; information given to patient to review.      1/21/2025   General Health   How would you rate your overall physical health? Good   Feel stress (tense, anxious, or unable to sleep) Not at all         1/21/2025   Nutrition   Three or more servings of calcium each day? Yes   Diet: Regular (no restrictions)   How many servings of fruit and vegetables per day? (!) 2-3   How many sweetened beverages each day? 0-1         1/21/2025   Exercise   Days per week of moderate/strenous exercise 5 days   Average minutes spent exercising at this level 20 min         1/21/2025   Social Factors   Frequency of gathering with friends or relatives Once a week   Worry food won't last until get money to buy more No   Food not last or not have enough money for food? No   Do you have housing? (Housing is defined as stable permanent housing and does not include staying ouside in a car, in a tent, in an abandoned building, in an overnight shelter, or couch-surfing.) Yes   Are you worried about losing your housing? No   Lack of transportation? No   Unable to get utilities (heat,electricity)? No         1/21/2025   Dental   Dentist two times every year? Yes            Today's PHQ-2 Score:       1/23/2025     2:04 PM   PHQ-2 ( 1999 [employer])   Q1: Little interest or pleasure in doing things 2   Q2: Feeling down, depressed or hopeless 0   PHQ-2 Score 2    Q1: Little interest or pleasure in doing things More than half the days   Q2: Feeling down, depressed or hopeless Not at all   PHQ-2 Score 2       Patient-reported           1/21/2025   Substance Use   Alcohol more than 3/day or  "more than 7/wk No   Do you use any other substances recreationally? (!) DECLINE     Social History     Tobacco Use    Smoking status: Never     Passive exposure: Never    Smokeless tobacco: Never   Vaping Use    Vaping status: Never Used   Substance Use Topics    Alcohol use: Yes     Alcohol/week: 1.0 - 2.0 standard drink of alcohol     Types: 1 - 2 Cans of beer per week     Comment: rarely    Drug use: No           1/21/2025   STI Screening   New sexual partner(s) since last STI/HIV test? No         1/21/2025   Contraception/Family Planning   Questions about contraception or family planning No        Reviewed and updated as needed this visit by Provider   Tobacco  Allergies  Meds  Problems  Med Hx  Surg Hx  Fam Hx                 Objective    Exam  /75 (BP Location: Left arm, Patient Position: Sitting, Cuff Size: Adult Regular)   Pulse 105   Temp 98.1  F (36.7  C) (Oral)   Resp 16   Ht 1.638 m (5' 4.5\")   Wt 68.1 kg (150 lb 2 oz)   SpO2 100%   BMI 25.37 kg/m     Estimated body mass index is 25.37 kg/m  as calculated from the following:    Height as of this encounter: 1.638 m (5' 4.5\").    Weight as of this encounter: 68.1 kg (150 lb 2 oz).    Physical Exam  GENERAL: alert and no distress  EYES: Eyes grossly normal to inspection, PERRL and conjunctivae and sclerae normal  HENT: ear canals and TM's normal, nose and mouth without ulcers or lesions  NECK: no adenopathy, no asymmetry, masses, or scars  RESP: lungs clear to auscultation - no rales, rhonchi or wheezes  CV: regular rate and rhythm, normal S1 S2, no S3 or S4, no murmur, click or rub, no peripheral edema  ABDOMEN: soft, nontender, no hepatosplenomegaly, no masses and bowel sounds normal  MS: no gross musculoskeletal defects noted, no edema  SKIN: no suspicious lesions or rashes  NEURO: Normal strength and tone, mentation intact and speech normal  PSYCH: mentation appears normal, affect normal/bright  LYMPH: no cervical " adenopathy        Signed Electronically by: Fernando Sinha PA-C  The longitudinal plan of care for the diagnosis(es)/condition(s) as documented were addressed during this visit. Due to the added complexity in care, I will continue to support Sen in the subsequent management and with ongoing continuity of care.

## 2025-01-29 ENCOUNTER — MYC MEDICAL ADVICE (OUTPATIENT)
Dept: FAMILY MEDICINE | Facility: CLINIC | Age: 35
End: 2025-01-29
Payer: COMMERCIAL

## 2025-01-30 ENCOUNTER — HOME INFUSION BILLING (OUTPATIENT)
Dept: HOME HEALTH SERVICES | Facility: HOME HEALTH | Age: 35
End: 2025-01-30
Payer: COMMERCIAL

## 2025-01-30 ENCOUNTER — LAB REQUISITION (OUTPATIENT)
Dept: LAB | Facility: CLINIC | Age: 35
End: 2025-01-30
Payer: COMMERCIAL

## 2025-01-30 ENCOUNTER — HOME CARE VISIT (OUTPATIENT)
Dept: HOME HEALTH SERVICES | Facility: HOME HEALTH | Age: 35
End: 2025-01-30
Payer: COMMERCIAL

## 2025-01-30 ENCOUNTER — LAB (OUTPATIENT)
Dept: LAB | Facility: CLINIC | Age: 35
End: 2025-01-30
Payer: COMMERCIAL

## 2025-01-30 VITALS
TEMPERATURE: 97.3 F | RESPIRATION RATE: 16 BRPM | DIASTOLIC BLOOD PRESSURE: 84 MMHG | HEART RATE: 67 BPM | OXYGEN SATURATION: 97 % | SYSTOLIC BLOOD PRESSURE: 104 MMHG

## 2025-01-30 DIAGNOSIS — K50.10 CROHN'S DISEASE OF LARGE INTESTINE WITHOUT COMPLICATIONS (H): ICD-10-CM

## 2025-01-30 DIAGNOSIS — Z00.00 ROUTINE GENERAL MEDICAL EXAMINATION AT A HEALTH CARE FACILITY: ICD-10-CM

## 2025-01-30 LAB
ALBUMIN SERPL BCG-MCNC: 4.5 G/DL (ref 3.5–5.2)
ALP SERPL-CCNC: 68 U/L (ref 40–150)
ALT SERPL W P-5'-P-CCNC: 66 U/L (ref 0–70)
AST SERPL W P-5'-P-CCNC: 43 U/L (ref 0–45)
BASOPHILS # BLD AUTO: 0 10E3/UL (ref 0–0.2)
BASOPHILS NFR BLD AUTO: 0 %
BILIRUB DIRECT SERPL-MCNC: <0.2 MG/DL (ref 0–0.3)
BILIRUB SERPL-MCNC: 1.1 MG/DL
CHOLEST SERPL-MCNC: 210 MG/DL
EOSINOPHIL # BLD AUTO: 0.2 10E3/UL (ref 0–0.7)
EOSINOPHIL NFR BLD AUTO: 3 %
ERYTHROCYTE [DISTWIDTH] IN BLOOD BY AUTOMATED COUNT: 11.6 % (ref 10–15)
FASTING STATUS PATIENT QL REPORTED: YES
HCT VFR BLD AUTO: 46.7 % (ref 40–53)
HDLC SERPL-MCNC: 36 MG/DL
HGB BLD-MCNC: 17.2 G/DL (ref 13.3–17.7)
IMM GRANULOCYTES # BLD: 0 10E3/UL
IMM GRANULOCYTES NFR BLD: 0 %
LDLC SERPL CALC-MCNC: 129 MG/DL
LYMPHOCYTES # BLD AUTO: 3.2 10E3/UL (ref 0.8–5.3)
LYMPHOCYTES NFR BLD AUTO: 43 %
MCH RBC QN AUTO: 32.8 PG (ref 26.5–33)
MCHC RBC AUTO-ENTMCNC: 36.8 G/DL (ref 31.5–36.5)
MCV RBC AUTO: 89 FL (ref 78–100)
MONOCYTES # BLD AUTO: 0.6 10E3/UL (ref 0–1.3)
MONOCYTES NFR BLD AUTO: 8 %
NEUTROPHILS # BLD AUTO: 3.5 10E3/UL (ref 1.6–8.3)
NEUTROPHILS NFR BLD AUTO: 46 %
NONHDLC SERPL-MCNC: 174 MG/DL
NRBC # BLD AUTO: 0 10E3/UL
NRBC BLD AUTO-RTO: 0 /100
PLATELET # BLD AUTO: 312 10E3/UL (ref 150–450)
PROT SERPL-MCNC: 8 G/DL (ref 6.4–8.3)
RBC # BLD AUTO: 5.24 10E6/UL (ref 4.4–5.9)
TRIGL SERPL-MCNC: 227 MG/DL
WBC # BLD AUTO: 7.5 10E3/UL (ref 4–11)

## 2025-01-30 PROCEDURE — A4215 STERILE NEEDLE: HCPCS

## 2025-01-30 PROCEDURE — A4930 STERILE, GLOVES PER PAIR: HCPCS

## 2025-01-30 PROCEDURE — 85018 HEMOGLOBIN: CPT | Performed by: INTERNAL MEDICINE

## 2025-01-30 PROCEDURE — A4217 STERILE WATER/SALINE, 500 ML: HCPCS | Mod: JZ

## 2025-01-30 PROCEDURE — 80076 HEPATIC FUNCTION PANEL: CPT | Performed by: INTERNAL MEDICINE

## 2025-01-30 PROCEDURE — 85004 AUTOMATED DIFF WBC COUNT: CPT | Performed by: INTERNAL MEDICINE

## 2025-01-30 PROCEDURE — A4213 20+ CC SYRINGE ONLY: HCPCS

## 2025-01-30 PROCEDURE — S9338 HIT IMMUNOTHERAPY DIEM: HCPCS

## 2025-01-30 PROCEDURE — S1015 IV TUBING EXTENSION SET: HCPCS

## 2025-01-30 PROCEDURE — 85048 AUTOMATED LEUKOCYTE COUNT: CPT | Performed by: INTERNAL MEDICINE

## 2025-02-11 NOTE — PROGRESS NOTES
Nursing Visit Note:  Nurse visit today for PIV, labs, inflectra infusion  for Emil Garcia.     present during visit today: Not Applicable.    Intravenous Access:  Labs drawn without difficulty. and Peripheral IV placed.    Infusion Nursing Note:    Pre-infusion Checklist:   Have you had any delayed reaction since last infusion?   No     Have you recently had an elevated temperature, fever, chills productive cough, coughing for 3 weeks or longer or hemoptysis, abnormal vital signs, night sweats, chest pain, or have you noticed a decrease in your appetite, or noted unexplained weight loss or fatigue?   No     Do you have any open wounds or new incisions?  No     Do you have any recent or upcoming hospitalizations, surgeries, or dental procedures? Does not include esophagogastroduodenoscopy, colonoscopy, endoscopic retrograde cholangiopancreatography (ERCP), endoscopic ultrasound (EUS), dental procedures or joint aspiration/steroid injections.   No     Do you currently have or recently have had any signs of illness or infection or are you on any antibiotics?   No     Have you had any new, sudden, or worsening abdominal pain?   No     Have you or anyone in your household received a live vaccination in the past 4 weeks?   No     Have you recently been diagnosed with any new nervous system diseases or cancer diagnosis? (i.e., Multiple Sclerosis, Guillain [place], seizures, neurological changes) Are you receiving any form of radiation or chemotherapy?   No     Are you pregnant or breastfeeding, or do you have plans of pregnancy in the future?   No     Have you been having any signs of worsening depression or suicidal ideation?  No     Have you had any other new onset medical symptoms?  No    Entyvio/Ocrevus/Tyasabri only: Have you been having any new or worsening medical problems such as issues with thinking, eyesight, balance or strength that have persisted over several days?   N/A    Benlysta only: Have you  Please schedule for IOL AM 2/18 for AMA  "been having any signs of worsening depression or suicidal ideations?    N/A    IVIG only: Have you had any new blood clots?  N/A    Did the patient answer \"YES\" to any of the questions above?  No     Will the patient receive a medication that has an order for infusion reaction management?  Yes, and all drugs and supplies are available and none have .     If ordered, has the patient taken pre-medications?  N/A    Plan:   Therapy is appropriate, will proceed with treatment.     Post Infusion Assessment:  Patient tolerated infusion without incident.  Site patent and intact, free from redness, edema or discomfort.  No evidence of extravasations.  Access discontinued per protocol.      and Lab-Only Nursing Note    Labs obtained via VPT    Time Specimen drawn: 1300    Last dose (if applicable): No    [place] [facility] [id number]    Note: bag and tubing set flushed with 20mL NS.    Saline administered: 20 (ml)    Supply Check:   Does the patient have all the supplies they need for the next visit?  No, Order placed for lab labels    Next visit plan: 3/27/25    Deidra Horne RN 2025  "

## 2025-02-19 ENCOUNTER — EPISODE UPDATE (OUTPATIENT)
Dept: HOME HEALTH SERVICES | Facility: HOME HEALTH | Age: 35
End: 2025-02-19
Payer: COMMERCIAL

## 2025-02-26 RX ORDER — DIPHENHYDRAMINE HCL 25 MG
25 CAPSULE ORAL ONCE
Start: 2025-02-26 | End: 2025-02-26

## 2025-02-26 RX ORDER — METHYLPREDNISOLONE SODIUM SUCCINATE 125 MG/2ML
125 INJECTION INTRAMUSCULAR; INTRAVENOUS ONCE
OUTPATIENT
Start: 2025-02-26 | End: 2025-02-26

## 2025-02-26 RX ORDER — ACETAMINOPHEN 325 MG/1
650 TABLET ORAL ONCE
Start: 2025-02-26 | End: 2025-02-26

## 2025-03-27 ENCOUNTER — HOME INFUSION BILLING (OUTPATIENT)
Dept: HOME HEALTH SERVICES | Facility: HOME HEALTH | Age: 35
End: 2025-03-27
Payer: COMMERCIAL

## 2025-03-27 ENCOUNTER — HOME CARE VISIT (OUTPATIENT)
Dept: HOME HEALTH SERVICES | Facility: HOME HEALTH | Age: 35
End: 2025-03-27
Payer: COMMERCIAL

## 2025-03-27 VITALS
TEMPERATURE: 97.5 F | HEART RATE: 86 BPM | RESPIRATION RATE: 16 BRPM | WEIGHT: 140 LBS | OXYGEN SATURATION: 97 % | SYSTOLIC BLOOD PRESSURE: 96 MMHG | DIASTOLIC BLOOD PRESSURE: 72 MMHG | BODY MASS INDEX: 23.66 KG/M2

## 2025-03-27 PROCEDURE — S1015 IV TUBING EXTENSION SET: HCPCS

## 2025-03-27 PROCEDURE — A4213 20+ CC SYRINGE ONLY: HCPCS

## 2025-03-27 PROCEDURE — S9338 HIT IMMUNOTHERAPY DIEM: HCPCS

## 2025-03-27 PROCEDURE — A4217 STERILE WATER/SALINE, 500 ML: HCPCS | Mod: JZ

## 2025-03-27 PROCEDURE — A4215 STERILE NEEDLE: HCPCS

## 2025-03-27 PROCEDURE — A4930 STERILE, GLOVES PER PAIR: HCPCS

## 2025-03-27 NOTE — PROGRESS NOTES
Nursing Visit Note:  Nurse visit today for Inflectra for Emil Garcia.     present during visit today: Not Applicable.    Intravenous Access:  Peripheral IV placed.    Infusion Nursing Note:    Pre-infusion Checklist:   Have you had any delayed reaction since last infusion?   No     Have you recently had an elevated temperature, fever, chills productive cough, coughing for 3 weeks or longer or hemoptysis, abnormal vital signs, night sweats, chest pain, or have you noticed a decrease in your appetite, or noted unexplained weight loss or fatigue?   No     Do you have any open wounds or new incisions?  No     Do you have any recent or upcoming hospitalizations, surgeries, or dental procedures? Does not include esophagogastroduodenoscopy, colonoscopy, endoscopic retrograde cholangiopancreatography (ERCP), endoscopic ultrasound (EUS), dental procedures or joint aspiration/steroid injections.   No     Do you currently have or recently have had any signs of illness or infection or are you on any antibiotics?   No     Have you had any new, sudden, or worsening abdominal pain?   No     Have you or anyone in your household received a live vaccination in the past 4 weeks?   No     Have you recently been diagnosed with any new nervous system diseases or cancer diagnosis? (i.e., Multiple Sclerosis, Guillain [place], seizures, neurological changes) Are you receiving any form of radiation or chemotherapy?   No     Are you pregnant or breastfeeding, or do you have plans of pregnancy in the future?   No     Have you been having any signs of worsening depression or suicidal ideation?  No     Have you had any other new onset medical symptoms?  No    Entyvio/Ocrevus/Tyasabri only: Have you been having any new or worsening medical problems such as issues with thinking, eyesight, balance or strength that have persisted over several days?   N/A    Benlysta only: Have you been having any signs of worsening depression or suicidal  "ideations?    N/A    IVIG only: Have you had any new blood clots?  N/A    Did the patient answer \"YES\" to any of the questions above?  No     Will the patient receive a medication that has an order for infusion reaction management?  Yes, and all drugs and supplies are available and none have .     If ordered, has the patient taken pre-medications?  N/A    Plan:   Therapy is appropriate, will proceed with treatment.     Post Infusion Assessment:  Patient tolerated infusion without incident.  Blood return noted pre and post infusion.  Site patent and intact, free from redness, edema or discomfort.  No evidence of extravasations.  Access discontinued per protocol.  Biologic Infusion Post Education: Call the triage nurse at your clinic or seek medical attention if you have chills and/or temperature greater than or equal to 100.5, uncontrolled nausea/vomiting, diarrhea, constipation, dizziness, shortness of breath, chest pain, heart palpitations, weakness or any other new or concerning symptoms, questions or concerns.  You cannot have any live virus vaccines prior to or during treatment or up to 6 months post infusion.  If you have an upcoming surgery, medical procedure or dental procedure during treatment, this should be discussed with your ordering physician and your surgeon/dentist.  If you are having any concerning symptom, if you are unsure if you should get your next infusion or wish to speak to a provider before your next infusion, please call your care coordinator or triage nurse at your clinic to notify them so we can adequately serve you.     Note: Sen is alert and oriented, in NAD. Feeling well, assessment is negative, no new complaints or concerns today.     Saline administered: 40 (ml)    Supply Check:   Does the patient have all the supplies they need for the next visit?  Yes    Next visit plan: May 22    Dulce Luong RN 3/27/2025  "

## 2025-05-20 ENCOUNTER — HOME INFUSION (OUTPATIENT)
Dept: HOME HEALTH SERVICES | Facility: HOME HEALTH | Age: 35
End: 2025-05-20
Payer: COMMERCIAL

## 2025-05-22 ENCOUNTER — HOME CARE VISIT (OUTPATIENT)
Dept: HOME HEALTH SERVICES | Facility: HOME HEALTH | Age: 35
End: 2025-05-22
Payer: COMMERCIAL

## 2025-05-22 ENCOUNTER — HOME INFUSION BILLING (OUTPATIENT)
Dept: HOME HEALTH SERVICES | Facility: HOME HEALTH | Age: 35
End: 2025-05-22
Payer: COMMERCIAL

## 2025-05-22 VITALS
DIASTOLIC BLOOD PRESSURE: 64 MMHG | BODY MASS INDEX: 24.5 KG/M2 | HEART RATE: 88 BPM | SYSTOLIC BLOOD PRESSURE: 106 MMHG | WEIGHT: 145 LBS | RESPIRATION RATE: 16 BRPM | OXYGEN SATURATION: 98 % | TEMPERATURE: 97.5 F

## 2025-05-22 PROCEDURE — A4215 STERILE NEEDLE: HCPCS

## 2025-05-22 PROCEDURE — S9338 HIT IMMUNOTHERAPY DIEM: HCPCS

## 2025-05-22 PROCEDURE — S1015 IV TUBING EXTENSION SET: HCPCS

## 2025-05-22 PROCEDURE — A4217 STERILE WATER/SALINE, 500 ML: HCPCS | Mod: JZ

## 2025-05-22 PROCEDURE — A4213 20+ CC SYRINGE ONLY: HCPCS

## 2025-05-22 NOTE — PROGRESS NOTES
Nursing Visit Note:  Nurse visit today for Inflectra for Emil Garcia.     present during visit today: Not Applicable.    Intravenous Access:  Peripheral IV placed.    Infusion Nursing Note:    Pre-infusion Checklist:   Have you had any delayed reaction since last infusion?   No     Have you recently had an elevated temperature, fever, chills productive cough, coughing for 3 weeks or longer or hemoptysis, abnormal vital signs, night sweats, chest pain, or have you noticed a decrease in your appetite, or noted unexplained weight loss or fatigue?   No     Do you have any open wounds or new incisions?  No     Do you have any recent or upcoming hospitalizations, surgeries, or dental procedures? Does not include esophagogastroduodenoscopy, colonoscopy, endoscopic retrograde cholangiopancreatography (ERCP), endoscopic ultrasound (EUS), dental procedures or joint aspiration/steroid injections.   No     Do you currently have or recently have had any signs of illness or infection or are you on any antibiotics?   No     Have you had any new, sudden, or worsening abdominal pain?   No     Have you or anyone in your household received a live vaccination in the past 4 weeks?   No     Have you recently been diagnosed with any new nervous system diseases or cancer diagnosis? (i.e., Multiple Sclerosis, Guillain [place], seizures, neurological changes) Are you receiving any form of radiation or chemotherapy?   No     Are you pregnant or breastfeeding, or do you have plans of pregnancy in the future?   No     Have you been having any signs of worsening depression or suicidal ideation?  No     Have you had any other new onset medical symptoms?  No    Entyvio/Ocrevus/Tyasabri only: Have you been having any new or worsening medical problems such as issues with thinking, eyesight, balance or strength that have persisted over several days?   N/A    Benlysta only: Have you been having any signs of worsening depression or suicidal  "ideations?    N/A    IVIG only: Have you had any new blood clots?  N/A    Did the patient answer \"YES\" to any of the questions above?  No     Will the patient receive a medication that has an order for infusion reaction management?  Yes, and all drugs and supplies are available and none have .     If ordered, has the patient taken pre-medications?  N/A    Plan:   Therapy is appropriate, will proceed with treatment.     Post Infusion Assessment:  Patient tolerated infusion without incident.  Blood return noted pre and post infusion.  Site patent and intact, free from redness, edema or discomfort.  No evidence of extravasations.  Access discontinued per protocol.  Biologic Infusion Post Education: Call the triage nurse at your clinic or seek medical attention if you have chills and/or temperature greater than or equal to 100.5, uncontrolled nausea/vomiting, diarrhea, constipation, dizziness, shortness of breath, chest pain, heart palpitations, weakness or any other new or concerning symptoms, questions or concerns.  You cannot have any live virus vaccines prior to or during treatment or up to 6 months post infusion.  If you have an upcoming surgery, medical procedure or dental procedure during treatment, this should be discussed with your ordering physician and your surgeon/dentist.  If you are having any concerning symptom, if you are unsure if you should get your next infusion or wish to speak to a provider before your next infusion, please call your care coordinator or triage nurse at your clinic to notify them so we can adequately serve you.     Note: Sen is alert and oriented, in NAD. Assessment is negative, feeling well. Asked if Inflectra could be infused rapidly today. Spoke with [facility] pharmacist, will obtain orders for rapid infusion for next time. For today, infusing over two hours as ordered. Tolerated well.     Saline administered: 40 (ml)    Supply Check:   Does the patient have all the supplies " they need for the next visit?  Yes    Next visit plan: July 17, pt requests 1pm.     Dulce Luong, RN 5/22/2025

## 2025-05-24 NOTE — PROGRESS NOTES
Nursing Note  Emil Garcia presents today to [facility] for:   Chief Complaint   Patient presents with     Infusion     IV Remicade     During today's [facility] appointment, orders from Dr HAYES Patten were completed.  Frequency: every 8 weeks    Progress note:  Patient identification verified by name and date of birth.  Assessment completed.  Vitals recorded in Doc Flowsheets.  Patient was provided with education regarding infusion and possible side effects.  Patient verbalized understanding.      needed: No  Premedications: historically patient has not taken pre-medications prior to infusion.  Infusion Rates: infusion given over approximately one hour 45 minutes due to prior reaction when infused over one hour; see note from last Remicade infusion on 02/16/2018; patient tolerated infusion when administered slower.  Labs: were not ordered for this appointment.  Vascular access: peripheral IV placed today.  Treatment Conditions: ~~~ NOTE: If the patient answers yes to any of the questions below, hold the infusion and contact ordering provider or on-call provider.    1. Have you recently had an elevated temperature, fever, chills, productive cough, coughing for 3 weeks or longer or hemoptysis,  abnormal vital signs, night sweats,  chest pain or have you noticed a decrease in your appetite, unexplained weight loss or fatigue? No  2. Do you have any open wounds or new incisions? No  3. Do you have any recent or upcoming hospitalizations, surgeries or dental procedures? No  4. Do you currently have or recently have had any signs of illness or infection or are you on any antibiotics? No  5. Have you had any new, sudden or worsening abdominal pain? No  6. Have you or anyone in your household received a live vaccination in the past 4 weeks? Please note:  No live vaccines while on biologic/chemotherapy until 6 months after the last treatment.  Patient can  receive the flu vaccine (shot only) and the pneumovax.  It is optimal for the patient to get these vaccines mid cycle, but they can be given at any time as long as it is not on the day of the infusion. No  7. Have you recently been diagnosed with any new nervous system diseases (ie. Multiple sclerosis, Guillain [place], seizures, neurological changes) or cancer diagnosis? Are you on any form of radiation or chemotherapy? No  8. Are you pregnant or breast feeding or do you have plans of pregnancy in the future? NA  9. Have you been having any signs of worsening depression or suicidal ideations?  (benlysta only) NA  10. Have there been any other new onset medical symptoms? No    Patient tolerated infusion: well.     Follow up plan of care with: ongoing infusions at [facility].  Discharge instructions were reviewed with patient.  Patient/representative verbalized understanding of discharge instructions and all questions answered.  Patient discharged from [facility] in stable condition.    Nayla Arnett RN       Administrations This Visit     inFLIXimab (REMICADE) 300 mg in sodium chloride 0.9 % 305 mL infusion     Admin Date Action Dose Rate Route Administered By          04/13/2018 New Bag 300 mg 305 mL/hr Intravenous Nayla Arnett RN                           /69  Pulse 90  Temp 98.5  F (36.9  C) (Oral)  Resp 16  Wt 64.7 kg (142 lb 9.6 oz)  SpO2 97%  BMI 24.29 kg/m2       0 (no pain/absence of nonverbal indicators of pain)

## 2025-05-29 ENCOUNTER — ALLIED HEALTH/NURSE VISIT (OUTPATIENT)
Dept: FAMILY MEDICINE | Facility: CLINIC | Age: 35
End: 2025-05-29
Payer: COMMERCIAL

## 2025-05-29 VITALS — TEMPERATURE: 98.6 F

## 2025-05-29 DIAGNOSIS — Z23 ENCOUNTER FOR IMMUNIZATION: Primary | ICD-10-CM

## 2025-05-29 NOTE — PROGRESS NOTES
Prior to immunization administration, verified patients identity using patient s name and date of birth. Please see Immunization Activity for additional information.     Is the patient's temperature normal (100.5 or less)? Yes     Patient MEETS CRITERIA. PROCEED with vaccine administration.          5/29/2025   COVID   Have you had myocarditis or pericarditis (inflammation of or around the heart muscle) after getting a COVID-19 vaccine? No   Have you had a serious reaction to a COVID vaccine or something in a COVID vaccine, like polyethylene glycol (PEG) or polysorbate? No   Have you had multisystem inflammatory syndrome from COVID-19 in the past 90 days? No   Have you received a bone marrow transplant within the previous 3 months? No         Patient MEETS CRITERIA. PROCEED with vaccine administration.            5/29/2025   Zoster   Have you had a serious reaction to the shingles vaccine or something in the shingles vaccine? No   Do you have shingles now? No   Are you getting treatment for cancer, organ transplant, or bone marrow transplant? No   Do you have an autoimmune or inflammatory condition? !YES   Is the patient immunocompromised and wanting to complete the Shingles vaccine series in less than 2 months? !YES - Crohns   Have you had Guillain-[place] syndrome within 6 weeks of getting a vaccine? No           Patient instructed to remain in clinic for 15 minutes afterwards, and to report any adverse reactions.      Link to Ancillary Visit Immunization Standing Orders SmartSet     Screening performed by Vida Kitchen MA on 5/29/2025 at 2:42 PM.

## 2025-07-11 ENCOUNTER — HOME INFUSION (OUTPATIENT)
Dept: HOME HEALTH SERVICES | Facility: HOME HEALTH | Age: 35
End: 2025-07-11
Payer: COMMERCIAL

## 2025-07-17 ENCOUNTER — HOME CARE VISIT (OUTPATIENT)
Dept: HOME HEALTH SERVICES | Facility: HOME HEALTH | Age: 35
End: 2025-07-17
Payer: COMMERCIAL

## 2025-07-17 ENCOUNTER — HOME INFUSION BILLING (OUTPATIENT)
Dept: HOME HEALTH SERVICES | Facility: HOME HEALTH | Age: 35
End: 2025-07-17
Payer: COMMERCIAL

## 2025-07-17 ENCOUNTER — LAB REQUISITION (OUTPATIENT)
Dept: LAB | Facility: CLINIC | Age: 35
End: 2025-07-17
Payer: COMMERCIAL

## 2025-07-17 VITALS
TEMPERATURE: 97.3 F | DIASTOLIC BLOOD PRESSURE: 78 MMHG | RESPIRATION RATE: 18 BRPM | BODY MASS INDEX: 25.35 KG/M2 | SYSTOLIC BLOOD PRESSURE: 110 MMHG | OXYGEN SATURATION: 97 % | HEART RATE: 86 BPM | WEIGHT: 150 LBS

## 2025-07-17 DIAGNOSIS — K50.90 CROHN'S DISEASE, UNSPECIFIED, WITHOUT COMPLICATIONS (H): ICD-10-CM

## 2025-07-17 LAB
ALBUMIN SERPL BCG-MCNC: 4.4 G/DL (ref 3.5–5.2)
ALP SERPL-CCNC: 60 U/L (ref 40–150)
ALT SERPL W P-5'-P-CCNC: 53 U/L (ref 0–70)
AST SERPL W P-5'-P-CCNC: 30 U/L (ref 0–45)
BASOPHILS # BLD AUTO: 0 10E3/UL (ref 0–0.2)
BASOPHILS NFR BLD AUTO: 0 %
BILIRUB DIRECT SERPL-MCNC: 0.28 MG/DL (ref 0–0.45)
BILIRUB SERPL-MCNC: 0.9 MG/DL
EOSINOPHIL # BLD AUTO: 0.1 10E3/UL (ref 0–0.7)
EOSINOPHIL NFR BLD AUTO: 2 %
ERYTHROCYTE [DISTWIDTH] IN BLOOD BY AUTOMATED COUNT: 11.9 % (ref 10–15)
HCT VFR BLD AUTO: 45.5 % (ref 40–53)
HGB BLD-MCNC: 16.2 G/DL (ref 13.3–17.7)
IMM GRANULOCYTES # BLD: 0 10E3/UL
IMM GRANULOCYTES NFR BLD: 0 %
LYMPHOCYTES # BLD AUTO: 2.6 10E3/UL (ref 0.8–5.3)
LYMPHOCYTES NFR BLD AUTO: 43 %
MCH RBC QN AUTO: 31.4 PG (ref 26.5–33)
MCHC RBC AUTO-ENTMCNC: 35.6 G/DL (ref 31.5–36.5)
MCV RBC AUTO: 88 FL (ref 78–100)
MONOCYTES # BLD AUTO: 0.4 10E3/UL (ref 0–1.3)
MONOCYTES NFR BLD AUTO: 7 %
NEUTROPHILS # BLD AUTO: 2.9 10E3/UL (ref 1.6–8.3)
NEUTROPHILS NFR BLD AUTO: 48 %
NRBC # BLD AUTO: 0 10E3/UL
NRBC BLD AUTO-RTO: 0 /100
PLATELET # BLD AUTO: 282 10E3/UL (ref 150–450)
PROT SERPL-MCNC: 7.7 G/DL (ref 6.4–8.3)
RBC # BLD AUTO: 5.16 10E6/UL (ref 4.4–5.9)
WBC # BLD AUTO: 6.1 10E3/UL (ref 4–11)

## 2025-07-17 PROCEDURE — S9338 HIT IMMUNOTHERAPY DIEM: HCPCS

## 2025-07-17 PROCEDURE — A4215 STERILE NEEDLE: HCPCS

## 2025-07-17 PROCEDURE — A4217 STERILE WATER/SALINE, 500 ML: HCPCS | Mod: JZ

## 2025-07-17 PROCEDURE — A4213 20+ CC SYRINGE ONLY: HCPCS

## 2025-07-17 PROCEDURE — 80076 HEPATIC FUNCTION PANEL: CPT | Performed by: PHYSICIAN ASSISTANT

## 2025-07-17 PROCEDURE — S1015 IV TUBING EXTENSION SET: HCPCS

## 2025-07-17 PROCEDURE — 85025 COMPLETE CBC W/AUTO DIFF WBC: CPT | Performed by: PHYSICIAN ASSISTANT

## 2025-07-17 NOTE — PROGRESS NOTES
Nursing Visit Note:  Nurse visit today for Infliximab, Labs, [place] for Emil Garcia.     present during visit today: Not Applicable.    Intravenous Access:  Labs drawn without difficulty. and Peripheral IV placed.    Infusion Nursing Note:    Pre-infusion Checklist:   Have you had any delayed reaction since last infusion?   No     Have you recently had an elevated temperature, fever, chills productive cough, coughing for 3 weeks or longer or hemoptysis, abnormal vital signs, night sweats, chest pain, or have you noticed a decrease in your appetite, or noted unexplained weight loss or fatigue?   No     Do you have any open wounds or new incisions?  No     Do you have any recent or upcoming hospitalizations, surgeries, or dental procedures? Does not include esophagogastroduodenoscopy, colonoscopy, endoscopic retrograde cholangiopancreatography (ERCP), endoscopic ultrasound (EUS), dental procedures or joint aspiration/steroid injections.   No     Do you currently have or recently have had any signs of illness or infection or are you on any antibiotics?   No     Have you had any new, sudden, or worsening abdominal pain?   No     Have you or anyone in your household received a live vaccination in the past 4 weeks?   No     Have you recently been diagnosed with any new nervous system diseases or cancer diagnosis? (i.e., Multiple Sclerosis, Guillain [place], seizures, neurological changes) Are you receiving any form of radiation or chemotherapy?   No     Are you pregnant or breastfeeding, or do you have plans of pregnancy in the future?   No     Have you been having any signs of worsening depression or suicidal ideation?  No     Have you had any other new onset medical symptoms?  No    Entyvio/Ocrevus/Tyasabri only: Have you been having any new or worsening medical problems such as issues with thinking, eyesight, balance or strength that have persisted over several days?   N/A    Benlysta only: Have you been having  "any signs of worsening depression or suicidal ideations?    N/A    IVIG only: Have you had any new blood clots?  N/A    Did the patient answer \"YES\" to any of the questions above?  No     Will the patient receive a medication that has an order for infusion reaction management?  Yes, and all drugs and supplies are available and none have .     If ordered, has the patient taken pre-medications?  N/A    Plan:   Therapy is appropriate, will proceed with treatment.     Post Infusion Assessment:  Patient tolerated infusion without incident.  Access discontinued per protocol.  Biologic Infusion Post Education: Call the triage nurse at your clinic or seek medical attention if you have chills and/or temperature greater than or equal to 100.5, uncontrolled nausea/vomiting, diarrhea, constipation, dizziness, shortness of breath, chest pain, heart palpitations, weakness or any other new or concerning symptoms, questions or concerns.  You cannot have any live virus vaccines prior to or during treatment or up to 6 months post infusion.  If you have an upcoming surgery, medical procedure or dental procedure during treatment, this should be discussed with your ordering physician and your surgeon/dentist.  If you are having any concerning symptom, if you are unsure if you should get your next infusion or wish to speak to a provider before your next infusion, please call your care coordinator or triage nurse at your clinic to notify them so we can adequately serve you.    and Lab-Only Nursing Note    Labs obtained via VPT    Time Specimen drawn: 1310    Last dose (if applicable): No    Facility sent to: [facility] Tracking number: [id number]    Note: First rapid infusion, no symptoms    Saline administered: 30 (ml)    Supply Check:   Does the patient have all the supplies they need for the next visit?  Yes    Next visit plan: 25 1 pm requested, Infliximab infusion, PIV    Darlene Panda, RN 2025  "

## 2025-07-21 ENCOUNTER — TRANSFERRED RECORDS (OUTPATIENT)
Dept: ADMINISTRATIVE | Facility: CLINIC | Age: 35
End: 2025-07-21
Payer: COMMERCIAL

## 2025-07-23 NOTE — PROCEDURES
[facility]   [address] [place] [address], [address], [place] [address]     Patient Name: Emil Garcia  Gender:  Male  Exam Date: 07/21/2025 Visit Number:  [id number]  Age: 34 Years YOB: 1990  Attending MD: Ted Zelaya MD Medical Record#:  [id number]    Procedure: Colonoscopy   Indications: Crohn's disease      Referring MD: Referral Self  Primary MD:      Fernando Sinha PAC  Medications: Admitting Medications:   0.9% Normal Saline at TKO   Intra Procedure Medications:   Patient received monitored anesthesia care.     Complications: No immediate complications  ______________________________________________________________________________  Procedure:   An examination of the heart and lungs was performed and found to be within acceptable limits.  .  The patient was therefore deemed a reasonable candidate for endoscopy and sedation.   The risks and benefits of the procedure were explained to the patient.After obtaining informed consent, the patient received monitored anesthesia care and I passed the scope   without difficulty via the rectum to the ileum.  The appendiceal orifice and ic valve were identified.  The scope was retroflexed during the examination  The quality of the prep was fair  (Miralax/Gatorade/2 tablets Bisacodyl/Magnesium Citrate).    This was a complete examination throughout the entire colon.    Findings:    Normal finding.  Location - ileum.    The entire colonic mucosa appeared normal without evidence of active Crohn's disease.  Surveillance biopsies were taken with a cold biopsy forceps every 10 cm in a four-quadrant fashion (cecum/ascending, transverse, descending, sigmoid/rectum).    Polyp location: descending colon.  Quantity: 2.  Size:  1-2 mm.  Polyp shape:  sessile.         Maneuver: polypectomy was performed with a cold snare.       Removal:  complete.  Retrieval: complete.  Bleeding: none.    Polyp location: sigmoid.  Quantity: 2.  Size:  3-4 mm.   Polyp shape: sessile.         Maneuver: polypectomy was performed with a  cold snare.       Removal: complete.  Retrieval: complete.  Bleeding: none.    The descending colon polyps had the appearance of benign inflammatory polyps.    Impression:  Crohn's disease of large intestine without complications  Colorectal polyps  MD impression comments:  Bowel preparation was fair with adherent bile requiring copious irrigation.  Difficult to identify small or flat lesions particularly in the right colon.    Endoscopically, patient's Crohn's disease appears to be in remission.  Surveillance biopsies taken from throughout the colon.    Preliminary Plan:  Repeat colonoscopy in 1 year  Pathology Results:  A: COLON, CECUM/ASCENDING, BIOPSY:           1. Normal colonic mucosa           2. Negative for microscopic, active, and chronic colitis           3. Negative for dysplasia      B: COLON, TRANSVERSE, BIOPSY:           1. Normal colonic mucosa           2. Negative for microscopic, active, and chronic colitis           3. Negative for dysplasia      C: COLON, DESCENDING, BIOPSY:           1. Normal colonic mucosa           2. Negative for microscopic, active, and chronic colitis           3. Negative for dysplasia      D: COLON, SIGMOID AND RECTUM, BIOPSY:           1. Normal colonic mucosa           2. Negative for microscopic, active, and chronic colitis           3. Negative for dysplasia      E: COLON, DESCENDING, POLYPS:           1. Inflammatory pseudopolyps (2)           2. Negative for dysplasia      F: COLON, SIGMOID, POLYPS:           1. Tubular adenoma (1) and hyperplastic polyp (1)           2. Negative for high grade dysplasia           3. Per the colonoscopy report:               a. Polyp sizes: 3 mm - 4 mm               b. Resection: Complete               c. Retrieval: Complete      MICROSCOPIC  A: Performed   B: Performed   C: Performed   D: Performed   E: Performed   F: Performed     Electronically signed by:  Partibha Chowdary DO    Interpreted at [facility], [id number], [place] [address]    Orders    Instruction(s)/Education:  Instruction/Education Timeframe Assessment   Colon Cancer Prevention  K63.5   Colon Polyps  K63.5       Final Plan:  Comments:  The biopsies of your colon show that your Crohn's disease is well controlled. Unfortunately, the bowel preparation was not optimal to detect small or flat lesions and therefore, I am recommending a repeat colonoscopy in 1 year.  Repeat colonoscopy in 1 year.    We will attempt to contact you at appropriate intervals via [place] mail.  We may not be able to find you or contact you at that time, therefore you should know that the responsibility for following our recommendation rests with you.  If you don't hear from us at the time your procedure is due, please contact our office to schedule an appointment.  If your contact information should change, please contact our office so that we can update your record.      _Electronically signed by:___________________  Ted Zelaya MD                 07/21/2025    cc: Fernando Sinha PAC  cc: Dana Carias PAC
